# Patient Record
Sex: FEMALE | Race: WHITE | HISPANIC OR LATINO | Employment: UNEMPLOYED | URBAN - METROPOLITAN AREA
[De-identification: names, ages, dates, MRNs, and addresses within clinical notes are randomized per-mention and may not be internally consistent; named-entity substitution may affect disease eponyms.]

---

## 2020-03-02 DIAGNOSIS — B37.3 VAGINAL CANDIDA: Primary | ICD-10-CM

## 2020-03-03 ENCOUNTER — TELEPHONE (OUTPATIENT)
Dept: OBGYN CLINIC | Facility: CLINIC | Age: 50
End: 2020-03-03

## 2020-03-03 DIAGNOSIS — N76.0 BV (BACTERIAL VAGINOSIS): Primary | ICD-10-CM

## 2020-03-03 DIAGNOSIS — B96.89 BV (BACTERIAL VAGINOSIS): Primary | ICD-10-CM

## 2020-03-03 DIAGNOSIS — B37.3 CANDIDA VAGINITIS: ICD-10-CM

## 2020-03-03 RX ORDER — METRONIDAZOLE 500 MG/1
500 TABLET ORAL EVERY 12 HOURS SCHEDULED
Qty: 14 TABLET | Refills: 0 | Status: SHIPPED | OUTPATIENT
Start: 2020-03-03 | End: 2020-03-10

## 2020-03-03 RX ORDER — FLUCONAZOLE 150 MG/1
150 TABLET ORAL ONCE
Qty: 1 TABLET | Refills: 0 | Status: SHIPPED | OUTPATIENT
Start: 2020-03-03 | End: 2020-03-03

## 2020-05-14 ENCOUNTER — TELEPHONE (OUTPATIENT)
Dept: OBGYN CLINIC | Facility: CLINIC | Age: 50
End: 2020-05-14

## 2020-05-14 DIAGNOSIS — N76.0 ACUTE VAGINITIS: Primary | ICD-10-CM

## 2020-05-14 RX ORDER — FLUCONAZOLE 150 MG/1
150 TABLET ORAL ONCE
Qty: 1 TABLET | Refills: 0 | Status: SHIPPED | OUTPATIENT
Start: 2020-05-14 | End: 2020-05-14

## 2020-07-07 ENCOUNTER — OFFICE VISIT (OUTPATIENT)
Dept: OBGYN CLINIC | Facility: CLINIC | Age: 50
End: 2020-07-07
Payer: COMMERCIAL

## 2020-07-07 VITALS
TEMPERATURE: 97.6 F | SYSTOLIC BLOOD PRESSURE: 112 MMHG | WEIGHT: 174 LBS | BODY MASS INDEX: 27.97 KG/M2 | DIASTOLIC BLOOD PRESSURE: 66 MMHG | HEIGHT: 66 IN

## 2020-07-07 DIAGNOSIS — B37.3 VAGINAL CANDIDA: ICD-10-CM

## 2020-07-07 DIAGNOSIS — Z11.3 SCREENING EXAMINATION FOR VENEREAL DISEASE: Primary | ICD-10-CM

## 2020-07-07 DIAGNOSIS — N76.0 ACUTE VAGINITIS: ICD-10-CM

## 2020-07-07 PROCEDURE — 99202 OFFICE O/P NEW SF 15 MIN: CPT | Performed by: OBSTETRICS & GYNECOLOGY

## 2020-07-07 PROCEDURE — 87491 CHLMYD TRACH DNA AMP PROBE: CPT | Performed by: OBSTETRICS & GYNECOLOGY

## 2020-07-07 PROCEDURE — 87591 N.GONORRHOEAE DNA AMP PROB: CPT | Performed by: OBSTETRICS & GYNECOLOGY

## 2020-07-07 RX ORDER — FLUCONAZOLE 150 MG/1
150 TABLET ORAL
Qty: 2 TABLET | Refills: 0 | Status: SHIPPED | OUTPATIENT
Start: 2020-07-07 | End: 2020-07-11

## 2020-07-07 NOTE — PROGRESS NOTES
Assessment/Plan:     Diagnoses and all orders for this visit:    Screening examination for venereal disease  -     Rapid HIV 1/2 AB-AG Combo; Future  -     Hepatitis C antibody; Future  -     Hepatitis B surface antigen; Future  -     RPR; Future  -     Chlamydia/GC amplified DNA by PCR    Acute vaginitis  -     Cancel: VAGINOSIS DNA PROBE (AFFIRM); Future  -     VAGINOSIS DNA PROBE (AFFIRM)    Vaginal candida  -     fluconazole (DIFLUCAN) 150 mg tablet; Take 1 tablet (150 mg total) by mouth every third day for 2 doses    Other orders  -     metFORMIN (GLUCOPHAGE) 500 mg tablet; Take 500 mg by mouth 2 (two) times a day with meals      54 YO FEMALE   Vaginal itching/discahrge  Desires STD check   HSV2  Smoker  DM  PLAN   Gc/ct  Trich/candida  Diflucan  rto for annual exam     Subjective:      Patient ID: Blanca Luo is a 53 y o  female  Vaginal Discharge   The patient's primary symptoms include vaginal discharge  This is a new problem  The current episode started in the past 7 days  The problem occurs intermittently  The problem has been waxing and waning  The patient is experiencing no pain  She is not pregnant  Associated symptoms include abdominal pain  Pertinent negatives include no anorexia, back pain, chills, constipation, diarrhea, discolored urine, dysuria, fever, flank pain, frequency, headaches, hematuria, nausea, urgency or vomiting  The vaginal discharge was white  There has been no bleeding  Nothing aggravates the symptoms  She has tried nothing for the symptoms  It is unknown whether or not her partner has an STD  She uses nothing for contraception  The following portions of the patient's history were reviewed and updated as appropriate: allergies, current medications, past family history, past medical history, past social history, past surgical history and problem list     Review of Systems   Constitutional: Negative for chills and fever  Gastrointestinal: Positive for abdominal pain  Negative for anorexia, constipation, diarrhea, nausea and vomiting  Genitourinary: Positive for vaginal discharge  Negative for dysuria, flank pain, frequency, hematuria and urgency  Musculoskeletal: Negative for back pain  Neurological: Negative for headaches  Objective:      /66 (BP Location: Left arm)   Temp 97 6 °F (36 4 °C)   Ht 5' 6" (1 676 m)   Wt 78 9 kg (174 lb)   BMI 28 08 kg/m²          Physical Exam   Constitutional: She is oriented to person, place, and time  She appears well-developed and well-nourished  Abdominal: Soft  She exhibits no distension  There is no tenderness  Genitourinary: There is no rash, tenderness or lesion on the right labia  There is no rash, tenderness or lesion on the left labia  No erythema or tenderness in the vagina  No signs of injury around the vagina  No vaginal discharge found  Neurological: She is alert and oriented to person, place, and time  Psychiatric: She has a normal mood and affect   Her behavior is normal

## 2020-07-09 LAB
C TRACH DNA SPEC QL NAA+PROBE: NEGATIVE
N GONORRHOEA DNA SPEC QL NAA+PROBE: NEGATIVE

## 2020-08-10 LAB
HBV SURFACE AG SERPL QL IA: NORMAL
HCV AB S/CO SERPL IA: 0
HCV AB SERPL QL IA: NORMAL
HIV 1+2 AB+HIV1 P24 AG SERPL QL IA: NORMAL
RPR SER QL: NORMAL

## 2020-11-20 DIAGNOSIS — B37.9 YEAST INFECTION: Primary | ICD-10-CM

## 2020-11-20 RX ORDER — FLUCONAZOLE 150 MG/1
150 TABLET ORAL ONCE
COMMUNITY
End: 2020-11-20 | Stop reason: SDUPTHER

## 2020-11-20 RX ORDER — FLUCONAZOLE 150 MG/1
150 TABLET ORAL ONCE
Qty: 2 TABLET | Refills: 0 | Status: SHIPPED | OUTPATIENT
Start: 2020-11-20 | End: 2020-11-20

## 2020-12-03 ENCOUNTER — TELEPHONE (OUTPATIENT)
Dept: OBGYN CLINIC | Facility: CLINIC | Age: 50
End: 2020-12-03

## 2020-12-08 ENCOUNTER — TELEPHONE (OUTPATIENT)
Dept: OBGYN CLINIC | Facility: CLINIC | Age: 50
End: 2020-12-08

## 2020-12-08 DIAGNOSIS — N76.0 BV (BACTERIAL VAGINOSIS): Primary | ICD-10-CM

## 2020-12-08 DIAGNOSIS — B96.89 BV (BACTERIAL VAGINOSIS): Primary | ICD-10-CM

## 2020-12-08 RX ORDER — METRONIDAZOLE 500 MG/1
500 TABLET ORAL EVERY 12 HOURS SCHEDULED
Qty: 14 TABLET | Refills: 0 | Status: SHIPPED | OUTPATIENT
Start: 2020-12-08 | End: 2020-12-15

## 2021-01-05 DIAGNOSIS — N39.0 URINARY TRACT INFECTION WITHOUT HEMATURIA, SITE UNSPECIFIED: Primary | ICD-10-CM

## 2021-01-05 RX ORDER — NITROFURANTOIN 25; 75 MG/1; MG/1
100 CAPSULE ORAL 2 TIMES DAILY
Qty: 14 CAPSULE | Refills: 0 | Status: SHIPPED | OUTPATIENT
Start: 2021-01-05 | End: 2021-01-12

## 2021-01-05 NOTE — TELEPHONE ENCOUNTER
Patient called stating she has a really bad uti, dysuria   Is urgently requesting for you to send in an antibiotic to her pharmacy

## 2021-03-23 ENCOUNTER — TELEPHONE (OUTPATIENT)
Dept: OBGYN CLINIC | Facility: CLINIC | Age: 51
End: 2021-03-23

## 2021-03-23 DIAGNOSIS — B37.3 CANDIDA VAGINITIS: Primary | ICD-10-CM

## 2021-03-23 RX ORDER — FLUCONAZOLE 150 MG/1
150 TABLET ORAL
Qty: 2 TABLET | Refills: 0 | Status: SHIPPED | OUTPATIENT
Start: 2021-03-23 | End: 2021-03-27

## 2021-04-28 ENCOUNTER — OFFICE VISIT (OUTPATIENT)
Dept: OBGYN CLINIC | Facility: CLINIC | Age: 51
End: 2021-04-28
Payer: COMMERCIAL

## 2021-04-28 VITALS
DIASTOLIC BLOOD PRESSURE: 76 MMHG | BODY MASS INDEX: 29.09 KG/M2 | WEIGHT: 181 LBS | HEIGHT: 66 IN | SYSTOLIC BLOOD PRESSURE: 120 MMHG

## 2021-04-28 DIAGNOSIS — N76.0 ACUTE VAGINITIS: ICD-10-CM

## 2021-04-28 DIAGNOSIS — Z12.31 ENCOUNTER FOR MAMMOGRAM TO ESTABLISH BASELINE MAMMOGRAM: ICD-10-CM

## 2021-04-28 DIAGNOSIS — Z72.51 HIGH RISK HETEROSEXUAL BEHAVIOR: ICD-10-CM

## 2021-04-28 DIAGNOSIS — N81.6 RECTOCELE: ICD-10-CM

## 2021-04-28 DIAGNOSIS — Z11.3 SCREENING EXAMINATION FOR VENEREAL DISEASE: Primary | ICD-10-CM

## 2021-04-28 DIAGNOSIS — R30.0 DYSURIA: ICD-10-CM

## 2021-04-28 DIAGNOSIS — Z72.51 UNPROTECTED SEXUAL INTERCOURSE: ICD-10-CM

## 2021-04-28 LAB
SL AMB  POCT GLUCOSE, UA: ABNORMAL
SL AMB LEUKOCYTE ESTERASE,UA: ABNORMAL
SL AMB POCT BILIRUBIN,UA: ABNORMAL
SL AMB POCT BLOOD,UA: ABNORMAL
SL AMB POCT KETONES,UA: ABNORMAL
SL AMB POCT NITRITE,UA: ABNORMAL
SL AMB POCT URINE HCG: NORMAL
SL AMB POCT URINE PROTEIN: ABNORMAL

## 2021-04-28 PROCEDURE — 87660 TRICHOMONAS VAGIN DIR PROBE: CPT | Performed by: OBSTETRICS & GYNECOLOGY

## 2021-04-28 PROCEDURE — 87491 CHLMYD TRACH DNA AMP PROBE: CPT | Performed by: OBSTETRICS & GYNECOLOGY

## 2021-04-28 PROCEDURE — 81002 URINALYSIS NONAUTO W/O SCOPE: CPT | Performed by: OBSTETRICS & GYNECOLOGY

## 2021-04-28 PROCEDURE — 87510 GARDNER VAG DNA DIR PROBE: CPT | Performed by: OBSTETRICS & GYNECOLOGY

## 2021-04-28 PROCEDURE — 87591 N.GONORRHOEAE DNA AMP PROB: CPT | Performed by: OBSTETRICS & GYNECOLOGY

## 2021-04-28 PROCEDURE — 99213 OFFICE O/P EST LOW 20 MIN: CPT | Performed by: OBSTETRICS & GYNECOLOGY

## 2021-04-28 PROCEDURE — 81025 URINE PREGNANCY TEST: CPT | Performed by: OBSTETRICS & GYNECOLOGY

## 2021-04-28 PROCEDURE — 87480 CANDIDA DNA DIR PROBE: CPT | Performed by: OBSTETRICS & GYNECOLOGY

## 2021-04-28 RX ORDER — LISINOPRIL 2.5 MG/1
2.5 TABLET ORAL DAILY
COMMUNITY
Start: 2021-03-24

## 2021-04-28 RX ORDER — ROSUVASTATIN CALCIUM 10 MG/1
10 TABLET, COATED ORAL DAILY
COMMUNITY
Start: 2021-04-01

## 2021-04-28 RX ORDER — ASPIRIN 81 MG
81 TABLET,CHEWABLE ORAL DAILY
COMMUNITY
Start: 2021-04-01

## 2021-04-28 RX ORDER — INSULIN GLARGINE 100 [IU]/ML
INJECTION, SOLUTION SUBCUTANEOUS
COMMUNITY
Start: 2021-04-14

## 2021-04-28 RX ORDER — ALPRAZOLAM 0.5 MG/1
0.5 TABLET ORAL DAILY PRN
COMMUNITY
Start: 2021-03-13

## 2021-04-28 RX ORDER — QUETIAPINE FUMARATE 400 MG/1
400 TABLET, FILM COATED ORAL
COMMUNITY
Start: 2021-03-31 | End: 2022-03-30 | Stop reason: SDUPTHER

## 2021-04-28 RX ORDER — CHOLECALCIFEROL (VITAMIN D3) 125 MCG
2000 CAPSULE ORAL DAILY
COMMUNITY
Start: 2021-02-24

## 2021-04-28 NOTE — PROGRESS NOTES
Assessment/Plan:         Diagnoses and all orders for this visit:    Screening examination for venereal disease  -     HIV 1/2 ANTIGEN/ANTIBODY (4TH GENERATION) W REFLEX [facility]; Future  -     Chlamydia/GC amplified DNA by PCR  -     RPR; Future    High risk heterosexual behavior  -     HIV 1/2 ANTIGEN/ANTIBODY (4TH GENERATION) W REFLEX [facility]; Future  -     Chlamydia/GC amplified DNA by PCR  -     RPR; Future    Encounter for mammogram to establish baseline mammogram  -     Mammo screening bilateral w 3d & cad; Future    Acute vaginitis  -     VAGINOSIS DNA PROBE (AFFIRM)    Unprotected sexual intercourse  -     POCT urine HCG    Dysuria  -     POCT urine dip    Other orders  -     Lantus SoloStar 100 units/mL injection pen; INJECT 28 UNIT BY SUBCUTANEOUS ROUTE EVERY DAY  -     QUEtiapine (SEROquel) 400 MG tablet; Take 400 mg by mouth daily at bedtime  -     lisinopril (ZESTRIL) 2 5 mg tablet; Take 2 5 mg by mouth daily  -     rosuvastatin (CRESTOR) 10 MG tablet; Take 10 mg by mouth daily  -     Aspirin Low Dose 81 MG chewable tablet; Chew 81 mg daily Chew  -     ALPRAZolam (XANAX) 0 5 mg tablet; Take 0 5 mg by mouth daily as needed  -     Cholecalciferol (Vitamin D3) 50 MCG (2000 UT) TABS; Take 2,000 Units by mouth daily          Subjective:      Patient ID: Jasen Sanders is a 53 y o  female  Patient is a 81-year-old nulligravida who presents for 2 reasons 1)  She notices some mild dysuria with some vaginal discomfort and urethral discomfort  She has discovered that her sexual partner has not been monogamous and she is concerned that she may have some type of sexually transmitted infection  She requests testing for HIV, syphilis, gonorrhea, and chlamydia  Cultures will be performed for all of those infections and blood work referrals given to the patient  Urinalysis was negative    2)  She notices a vaginal lump in the posterior aspect of the vagina Lo very close to the introitus which is not painful but has made her very anxious because her brother has anal carcinoma and she is concerned that she may also have something similar  She is turning 49 in June and has a colonoscopy scheduled at that time  Inspection revealed a small rectocele which coincides with the area that she describes feeling a bulge in  A rectal exam was performed and no mass was noted there is stool in the rectocele  A Hemoccult test was performed and it was heme negative  She will keep her appointment for her colonoscopy  The following portions of the patient's history were reviewed and updated as appropriate: allergies, current medications, past family history, past medical history, past social history, past surgical history and problem list     Review of Systems   Constitutional: Negative for chills, diaphoresis, fatigue, fever and unexpected weight change  HENT: Negative for congestion, sinus pressure, sinus pain, tinnitus and trouble swallowing  Eyes: Negative for visual disturbance  Respiratory: Negative for cough, chest tightness and shortness of breath  Cardiovascular: Negative for chest pain, palpitations and leg swelling  Gastrointestinal: Negative for abdominal distention, abdominal pain, anal bleeding, constipation, diarrhea, nausea, rectal pain and vomiting  Endocrine: Negative for heat intolerance  Genitourinary: Positive for vaginal pain  Negative for difficulty urinating, flank pain, frequency, genital sores, hematuria, urgency, vaginal bleeding and vaginal discharge  Musculoskeletal: Negative for arthralgias, back pain and joint swelling  Skin: Negative for rash  Allergic/Immunologic: Negative for environmental allergies and food allergies  Neurological: Negative for headaches  Hematological: Negative for adenopathy  Does not bruise/bleed easily  Psychiatric/Behavioral: Negative for decreased concentration and dysphoric mood  The patient is not nervous/anxious            Objective:      /76 (BP Location: Left arm)   Ht 5' 6" (1 676 m)   Wt 82 1 kg (181 lb)   BMI 29 21 kg/m²          Physical Exam  Vitals signs and nursing note reviewed  Exam conducted with a chaperone present  Constitutional:       General: She is not in acute distress  Appearance: Normal appearance  She is normal weight  She is not ill-appearing  HENT:      Head: Normocephalic  Nose: Nose normal       Mouth/Throat:      Mouth: Mucous membranes are moist       Pharynx: Oropharynx is clear  Eyes:      Conjunctiva/sclera: Conjunctivae normal       Pupils: Pupils are equal, round, and reactive to light  Neck:      Musculoskeletal: Neck supple  Cardiovascular:      Rate and Rhythm: Normal rate and regular rhythm  Pulses: Normal pulses  Pulmonary:      Effort: Pulmonary effort is normal       Breath sounds: Normal breath sounds  Chest:      Breasts: Zeeshan Score is 5  Right: Normal  No mass, nipple discharge, skin change or tenderness  Left: Normal  No mass, nipple discharge, skin change or tenderness  Abdominal:      General: Abdomen is flat  Bowel sounds are normal       Palpations: Abdomen is soft  Genitourinary:     General: Normal vulva  Exam position: Lithotomy position  Zeeshan stage (genital): 5       Vagina: Prolapsed vaginal walls present  Cervix: Normal       Uterus: Normal        Adnexa: Right adnexa normal and left adnexa normal       Rectum: Normal       Comments: Small rectocele  Musculoskeletal: Normal range of motion  Lymphadenopathy:      Upper Body:      Right upper body: No axillary adenopathy  Left upper body: No axillary adenopathy  Skin:     General: Skin is warm and dry  Neurological:      General: No focal deficit present  Mental Status: She is alert     Psychiatric:         Mood and Affect: Mood normal

## 2021-05-01 LAB
C TRACH DNA SPEC QL NAA+PROBE: NEGATIVE
CANDIDA RRNA VAG QL PROBE: NEGATIVE
G VAGINALIS RRNA GENITAL QL PROBE: NEGATIVE
N GONORRHOEA DNA SPEC QL NAA+PROBE: NEGATIVE
T VAGINALIS RRNA GENITAL QL PROBE: NEGATIVE

## 2021-05-07 ENCOUNTER — APPOINTMENT (OUTPATIENT)
Dept: LAB | Facility: HOSPITAL | Age: 51
End: 2021-05-07
Attending: OBSTETRICS & GYNECOLOGY
Payer: COMMERCIAL

## 2021-05-07 ENCOUNTER — TRANSCRIBE ORDERS (OUTPATIENT)
Dept: ADMINISTRATIVE | Facility: HOSPITAL | Age: 51
End: 2021-05-07

## 2021-05-07 DIAGNOSIS — Z11.3 SCREENING EXAMINATION FOR VENEREAL DISEASE: ICD-10-CM

## 2021-05-07 DIAGNOSIS — E11.9 DIABETES MELLITUS WITHOUT COMPLICATION (HCC): ICD-10-CM

## 2021-05-07 DIAGNOSIS — E55.9 AVITAMINOSIS D: ICD-10-CM

## 2021-05-07 DIAGNOSIS — E78.2 MIXED HYPERLIPIDEMIA: ICD-10-CM

## 2021-05-07 DIAGNOSIS — Z72.51 HIGH RISK HETEROSEXUAL BEHAVIOR: ICD-10-CM

## 2021-05-07 DIAGNOSIS — E11.9 DIABETES MELLITUS WITHOUT COMPLICATION (HCC): Primary | ICD-10-CM

## 2021-05-07 LAB
25(OH)D3 SERPL-MCNC: 27.2 NG/ML (ref 30–100)
ALBUMIN SERPL BCP-MCNC: 4.3 G/DL (ref 3.4–4.8)
ALP SERPL-CCNC: 99.1 U/L (ref 35–140)
ALT SERPL W P-5'-P-CCNC: 50 U/L (ref 5–54)
ANION GAP SERPL CALCULATED.3IONS-SCNC: 7 MMOL/L (ref 4–13)
AST SERPL W P-5'-P-CCNC: 24 U/L (ref 15–41)
BASOPHILS # BLD AUTO: 0.03 THOUSANDS/ΜL (ref 0–0.1)
BASOPHILS NFR BLD AUTO: 1 % (ref 0–1)
BILIRUB SERPL-MCNC: 0.35 MG/DL (ref 0.3–1.2)
BUN SERPL-MCNC: 14 MG/DL (ref 6–20)
CALCIUM SERPL-MCNC: 9.5 MG/DL (ref 8.4–10.2)
CHLORIDE SERPL-SCNC: 106 MMOL/L (ref 96–108)
CHOLEST SERPL-MCNC: 136 MG/DL
CO2 SERPL-SCNC: 27 MMOL/L (ref 22–33)
CREAT SERPL-MCNC: 0.65 MG/DL (ref 0.4–1.1)
CREAT UR-MCNC: 108 MG/DL
EOSINOPHIL # BLD AUTO: 0.1 THOUSAND/ΜL (ref 0–0.61)
EOSINOPHIL NFR BLD AUTO: 2 % (ref 0–6)
ERYTHROCYTE [DISTWIDTH] IN BLOOD BY AUTOMATED COUNT: 14.4 % (ref 11.6–15.1)
EST. AVERAGE GLUCOSE BLD GHB EST-MCNC: 137 MG/DL
GFR SERPL CREATININE-BSD FRML MDRD: 105 ML/MIN/1.73SQ M
GLUCOSE P FAST SERPL-MCNC: 183 MG/DL (ref 70–105)
HBA1C MFR BLD: 6.4 %
HCT VFR BLD AUTO: 43.8 % (ref 34.8–46.1)
HDLC SERPL-MCNC: 39 MG/DL
HGB BLD-MCNC: 13.9 G/DL (ref 11.5–15.4)
IMM GRANULOCYTES # BLD AUTO: 0.03 THOUSAND/UL (ref 0–0.2)
IMM GRANULOCYTES NFR BLD AUTO: 1 % (ref 0–2)
LDLC SERPL CALC-MCNC: 55 MG/DL (ref 0–100)
LYMPHOCYTES # BLD AUTO: 1.19 THOUSANDS/ΜL (ref 0.6–4.47)
LYMPHOCYTES NFR BLD AUTO: 19 % (ref 14–44)
MCH RBC QN AUTO: 25.7 PG (ref 26.8–34.3)
MCHC RBC AUTO-ENTMCNC: 31.7 G/DL (ref 31.4–37.4)
MCV RBC AUTO: 81 FL (ref 82–98)
MICROALBUMIN UR-MCNC: 10.5 MG/L (ref 0–20)
MICROALBUMIN/CREAT 24H UR: 10 MG/G CREATININE (ref 0–30)
MONOCYTES # BLD AUTO: 0.38 THOUSAND/ΜL (ref 0.17–1.22)
MONOCYTES NFR BLD AUTO: 6 % (ref 4–12)
NEUTROPHILS # BLD AUTO: 4.54 THOUSANDS/ΜL (ref 1.85–7.62)
NEUTS SEG NFR BLD AUTO: 71 % (ref 43–75)
NONHDLC SERPL-MCNC: 97 MG/DL
PLATELET # BLD AUTO: 192 THOUSANDS/UL (ref 149–390)
PMV BLD AUTO: 11.2 FL (ref 8.9–12.7)
POTASSIUM SERPL-SCNC: 4.4 MMOL/L (ref 3.5–5)
PROT SERPL-MCNC: 6.9 G/DL (ref 6.4–8.3)
RBC # BLD AUTO: 5.4 MILLION/UL (ref 3.81–5.12)
RPR SER QL: NORMAL
SODIUM SERPL-SCNC: 140 MMOL/L (ref 133–145)
TRIGL SERPL-MCNC: 212.4 MG/DL
TSH SERPL DL<=0.05 MIU/L-ACNC: 1.76 UIU/ML (ref 0.34–5.6)
WBC # BLD AUTO: 6.27 THOUSAND/UL (ref 4.31–10.16)

## 2021-05-07 PROCEDURE — 87389 HIV-1 AG W/HIV-1&-2 AB AG IA: CPT

## 2021-05-07 PROCEDURE — 82306 VITAMIN D 25 HYDROXY: CPT

## 2021-05-07 PROCEDURE — 85025 COMPLETE CBC W/AUTO DIFF WBC: CPT

## 2021-05-07 PROCEDURE — 82570 ASSAY OF URINE CREATININE: CPT | Performed by: FAMILY MEDICINE

## 2021-05-07 PROCEDURE — 86592 SYPHILIS TEST NON-TREP QUAL: CPT

## 2021-05-07 PROCEDURE — 36415 COLL VENOUS BLD VENIPUNCTURE: CPT

## 2021-05-07 PROCEDURE — 84443 ASSAY THYROID STIM HORMONE: CPT

## 2021-05-07 PROCEDURE — 82043 UR ALBUMIN QUANTITATIVE: CPT | Performed by: FAMILY MEDICINE

## 2021-05-07 PROCEDURE — 80053 COMPREHEN METABOLIC PANEL: CPT

## 2021-05-07 PROCEDURE — 83036 HEMOGLOBIN GLYCOSYLATED A1C: CPT

## 2021-05-07 PROCEDURE — 80061 LIPID PANEL: CPT

## 2021-05-10 LAB — HIV 1+2 AB+HIV1 P24 AG SERPL QL IA: NORMAL

## 2021-09-20 ENCOUNTER — OFFICE VISIT (OUTPATIENT)
Dept: OBGYN CLINIC | Facility: CLINIC | Age: 51
End: 2021-09-20
Payer: COMMERCIAL

## 2021-09-20 VITALS
WEIGHT: 176.2 LBS | DIASTOLIC BLOOD PRESSURE: 84 MMHG | SYSTOLIC BLOOD PRESSURE: 128 MMHG | BODY MASS INDEX: 28.32 KG/M2 | HEIGHT: 66 IN

## 2021-09-20 DIAGNOSIS — Z11.3 ENCOUNTER FOR SPECIAL SCREENING EXAMINATION FOR INFECTION WITH PREDOMINANTLY SEXUAL MODE OF TRANSMISSION: ICD-10-CM

## 2021-09-20 DIAGNOSIS — N89.8 VAGINAL DISCHARGE: ICD-10-CM

## 2021-09-20 DIAGNOSIS — R30.0 BURNING WITH URINATION: ICD-10-CM

## 2021-09-20 DIAGNOSIS — Z72.51 HIGH RISK HETEROSEXUAL BEHAVIOR: ICD-10-CM

## 2021-09-20 DIAGNOSIS — Z01.411 ENCOUNTER FOR GYNECOLOGICAL EXAMINATION WITH ABNORMAL FINDING: Primary | ICD-10-CM

## 2021-09-20 LAB
SL AMB  POCT GLUCOSE, UA: ABNORMAL
SL AMB LEUKOCYTE ESTERASE,UA: ABNORMAL
SL AMB POCT BILIRUBIN,UA: ABNORMAL
SL AMB POCT BLOOD,UA: ABNORMAL
SL AMB POCT CLARITY,UA: ABNORMAL
SL AMB POCT COLOR,UA: ABNORMAL
SL AMB POCT KETONES,UA: ABNORMAL
SL AMB POCT NITRITE,UA: POSITIVE
SL AMB POCT PH,UA: ABNORMAL
SL AMB POCT SPECIFIC GRAVITY,UA: ABNORMAL
SL AMB POCT URINE PROTEIN: ABNORMAL
SL AMB POCT UROBILINOGEN: ABNORMAL

## 2021-09-20 PROCEDURE — 87086 URINE CULTURE/COLONY COUNT: CPT | Performed by: OBSTETRICS & GYNECOLOGY

## 2021-09-20 PROCEDURE — 87491 CHLMYD TRACH DNA AMP PROBE: CPT | Performed by: OBSTETRICS & GYNECOLOGY

## 2021-09-20 PROCEDURE — G0145 SCR C/V CYTO,THINLAYER,RESCR: HCPCS | Performed by: OBSTETRICS & GYNECOLOGY

## 2021-09-20 PROCEDURE — 87660 TRICHOMONAS VAGIN DIR PROBE: CPT | Performed by: OBSTETRICS & GYNECOLOGY

## 2021-09-20 PROCEDURE — G0476 HPV COMBO ASSAY CA SCREEN: HCPCS | Performed by: OBSTETRICS & GYNECOLOGY

## 2021-09-20 PROCEDURE — 87480 CANDIDA DNA DIR PROBE: CPT | Performed by: OBSTETRICS & GYNECOLOGY

## 2021-09-20 PROCEDURE — 81002 URINALYSIS NONAUTO W/O SCOPE: CPT | Performed by: OBSTETRICS & GYNECOLOGY

## 2021-09-20 PROCEDURE — 87591 N.GONORRHOEAE DNA AMP PROB: CPT | Performed by: OBSTETRICS & GYNECOLOGY

## 2021-09-20 PROCEDURE — 0503F POSTPARTUM CARE VISIT: CPT | Performed by: OBSTETRICS & GYNECOLOGY

## 2021-09-20 PROCEDURE — 87510 GARDNER VAG DNA DIR PROBE: CPT | Performed by: OBSTETRICS & GYNECOLOGY

## 2021-09-20 PROCEDURE — 99396 PREV VISIT EST AGE 40-64: CPT | Performed by: OBSTETRICS & GYNECOLOGY

## 2021-09-20 RX ORDER — NITROFURANTOIN 25; 75 MG/1; MG/1
100 CAPSULE ORAL 2 TIMES DAILY
Qty: 14 CAPSULE | Refills: 0 | Status: SHIPPED | OUTPATIENT
Start: 2021-09-20 | End: 2021-09-27

## 2021-09-20 RX ORDER — METRONIDAZOLE 500 MG/1
500 TABLET ORAL EVERY 12 HOURS SCHEDULED
Qty: 14 TABLET | Refills: 0 | Status: SHIPPED | OUTPATIENT
Start: 2021-09-20 | End: 2021-09-27

## 2021-09-20 NOTE — PROGRESS NOTES
Yaniv Yepez is a 49 y o  female who presents for annual well woman exam   lmp 2 Y AGO     Menstrual History:  OB History        0    Para   0    Term   0       0    AB   0    Living   0       SAB   0    TAB   0    Ectopic   0    Multiple   0    Live Births   0                  No LMP recorded  Patient is postmenopausal        The following portions of the patient's history were reviewed and updated as appropriate: allergies, current medications, past family history, past medical history, past social history, past surgical history and problem list     Review of Systems  Review of Systems   Constitutional: Negative for activity change, appetite change, chills, fatigue and fever  Respiratory: Negative for cough and shortness of breath  Cardiovascular: Negative for chest pain, palpitations and leg swelling  Gastrointestinal: Negative for abdominal pain, constipation, diarrhea, nausea and vomiting  Genitourinary: Positive for dysuria  Negative for difficulty urinating, flank pain, frequency, hematuria, urgency and vaginal discharge  Neurological: Negative for dizziness and headaches  Psychiatric/Behavioral: Negative for confusion  Also complaining of vaginal odor      Objective      /84 (BP Location: Left arm, Patient Position: Sitting, Cuff Size: Standard)   Ht 5' 6" (1 676 m)   Wt 79 9 kg (176 lb 3 2 oz)   BMI 28 44 kg/m²     Physical Exam  OBGyn Exam     General:   alert and oriented, in no acute distress, alert, appears stated age and cooperative   Heart: regular rate and rhythm, S1, S2 normal, no murmur, click, rub or gallop   Lungs: clear to auscultation bilaterally   Abdomen: soft, non-tender, without masses or organomegaly   Vulva: normal   Vagina: normal mucosa, normal discharge   Cervix: no lesions   Uterus: normal size   Adnexa:  Breast Exam:  normal adnexa  breasts appear normal, no suspicious masses, no skin or nipple changes or axillary nodes  Assessment      @well woman@   48 YO FEMALE    annual exam  Dysuria   recurrent bacterial vaginosis  desires STD check  DM  smoker  asthma  bipolar/ depression stable  had laparoscopy sec to intra abd abscess with I&D at age 13 cause infertility  HSV2  plan  Plan   Pap/hpv   diet/exercise  Calcium / vitamin-D   Urine culture   Macrobid  Flagyl secondary to recurrent BV   GC/CT / affirm based on patient request  Smoking cessation highly recommended   Mammogram  Colonoscopy referral   Return to office for annual exam   All questions answered  There are no Patient Instructions on file for this visit

## 2021-09-21 ENCOUNTER — HOSPITAL ENCOUNTER (EMERGENCY)
Facility: HOSPITAL | Age: 51
Discharge: HOME | End: 2021-09-21
Payer: COMMERCIAL

## 2021-09-21 VITALS
HEART RATE: 90 BPM | BODY MASS INDEX: 28.41 KG/M2 | DIASTOLIC BLOOD PRESSURE: 91 MMHG | RESPIRATION RATE: 18 BRPM | WEIGHT: 176 LBS | SYSTOLIC BLOOD PRESSURE: 137 MMHG | TEMPERATURE: 98.6 F | OXYGEN SATURATION: 99 %

## 2021-09-21 DIAGNOSIS — T14.8XXA PUNCTURE WOUND: ICD-10-CM
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
## 2021-09-21 DIAGNOSIS — S80.11XA CONTUSION OF RIGHT LOWER EXTREMITY, INITIAL ENCOUNTER: ICD-10-CM

## 2021-09-21 DIAGNOSIS — S81.851A DOG BITE OF RIGHT LOWER LEG, INITIAL ENCOUNTER: Primary | ICD-10-CM

## 2021-09-21 LAB
BACTERIA UR CULT: NORMAL
C TRACH DNA SPEC QL NAA+PROBE: NEGATIVE
N GONORRHOEA DNA SPEC QL NAA+PROBE: NEGATIVE

## 2021-09-21 PROCEDURE — 99283 EMERGENCY DEPT VISIT LOW MDM: CPT

## 2021-09-21 PROCEDURE — 99284 EMERGENCY DEPT VISIT MOD MDM: CPT | Performed by: PHYSICIAN ASSISTANT

## 2021-09-21 PROCEDURE — 90471 IMMUNIZATION ADMIN: CPT

## 2021-09-21 PROCEDURE — 90715 TDAP VACCINE 7 YRS/> IM: CPT | Performed by: PHYSICIAN ASSISTANT

## 2021-09-21 RX ADMIN — TETANUS TOXOID, REDUCED DIPHTHERIA TOXOID AND ACELLULAR PERTUSSIS VACCINE, ADSORBED 0.5 ML: 5; 2.5; 8; 8; 2.5 SUSPENSION INTRAMUSCULAR at 14:17

## 2021-09-21 NOTE — ED NOTES
Patient was bite by a dog on Friday  States that she thinks the area is too " green" and not " black and blue enough"   Patient noted have some ecchymosis to right shin area with healing abrasion  No redness, swelling or warmth noted  Reuben Wells RN  09/21/21 1443

## 2021-09-21 NOTE — ED PROVIDER NOTES
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 Taking?    ALPRAZolam (XANAX) 0 5 mg tablet   Yes No   Sig: Take 0 5 mg by mouth daily as needed   Aspirin Low Dose 81 MG chewable tablet   Yes No   Sig: Chew 81 mg daily Chew   Cholecalciferol (Vitamin D3) 50 MCG (2000 UT) TABS   Yes No   Sig: Take 2,000 Units by mouth daily   Lantus SoloStar 100 units/mL injection pen   Yes No   Sig: INJECT 28 UNIT BY SUBCUTANEOUS ROUTE EVERY DAY   QUEtiapine (SEROquel) 400 MG tablet   Yes No   Sig: Take 400 mg by mouth daily at bedtime   lisinopril (ZESTRIL) 2 5 mg tablet   Yes No   Sig: Take 2 5 mg by mouth daily   Patient not taking: Reported on 9/20/2021   metFORMIN (GLUCOPHAGE) 500 mg tablet   Yes No   Sig: Take 500 mg by mouth 2 (two) times a day with meals   metroNIDAZOLE (FLAGYL) 500 mg tablet   No No   Sig: Take 1 tablet (500 mg total) by mouth every 12 (twelve) hours for 7 days   nitrofurantoin (MACROBID) 100 mg capsule   No No   Sig: Take 1 capsule (100 mg total) by mouth 2 (two) times a day for 7 days   rosuvastatin (CRESTOR) 10 MG tablet   Yes No   Sig: Take 10 mg by mouth daily      Facility-Administered Medications: None       Past Medical History:   Diagnosis Date    Asthma     Depression     Diabetes mellitus ([facility] [address] )     type 2    Hypertension        Past Surgical History:   Procedure Laterality Date    KNEE ARTHROCENTESIS      MOUTH SURGERY         Family History   Problem Relation Age of Onset    Diabetes Mother     Diabetes Father      I have reviewed and agree with the history as documented  E-Cigarette/Vaping    E-Cigarette Use Never User      E-Cigarette/Vaping Substances    THC No     CBD No      Social History     Tobacco Use    Smoking status: Current Every Day Smoker     Packs/day: 1 00     Types: Cigarettes    Smokeless tobacco: Never Used   Vaping Use    Vaping Use: Never used   Substance Use Topics    Alcohol use: Not Currently    Drug use: Never       Review of Systems   Constitutional: Negative for chills and fever  HENT: Negative for hearing loss and sore throat  Eyes: Negative for visual disturbance  Respiratory: Negative for shortness of breath  Cardiovascular: Negative for chest pain  Gastrointestinal: Negative for abdominal pain and vomiting  Musculoskeletal: Positive for myalgias  Negative for arthralgias and gait problem  Skin: Positive for color change and wound  Negative for pallor  Neurological: Negative for dizziness  Hematological: Bruises/bleeds easily  All other systems reviewed and are negative  Physical Exam  Physical Exam  Vitals and nursing note reviewed  Constitutional:       Appearance: She is well-developed  HENT:      Head: Normocephalic and atraumatic  Right Ear: External ear normal       Left Ear: External ear normal       Nose: Nose normal       Mouth/Throat:      Mouth: Mucous membranes are moist       Pharynx: Oropharynx is clear  Eyes:      Conjunctiva/sclera: Conjunctivae normal    Cardiovascular:      Rate and Rhythm: Normal rate  Pulmonary:      Effort: Pulmonary effort is normal  No respiratory distress     Musculoskeletal:         General: Swelling and tenderness present  Normal range of motion  Cervical back: Normal range of motion  Comments: RLE:  + 2  Small healing scab puncture wounds with mild surrounding older ecchymosis and slight swelling noted to right lower leg, no erythema, no warmth, no discharge, no signs of infection or cellulitis   Skin:     General: Skin is warm and dry  Findings: Bruising present  Neurological:      Mental Status: She is alert and oriented to person, place, and time  Motor: No weakness  Psychiatric:         Behavior: Behavior normal          Vital Signs  ED Triage Vitals [09/21/21 1336]   Temperature Pulse Respirations Blood Pressure SpO2   98 6 °F (37 °C) 90 18 137/91 99 %      Temp Source Heart Rate Source Patient Position - Orthostatic VS BP Location FiO2 (%)   Oral Monitor -- -- --      Pain Score       6           Vitals:    09/21/21 1336   BP: 137/91   Pulse: 90         Visual Acuity      ED Medications  Medications   tetanus-diphtheria-acellular pertussis (BOOSTRIX) IM injection 0 5 mL (has no administration in time range)       Diagnostic Studies  Results Reviewed     None                 No orders to display              Procedures  Procedures         ED Course                                           MDM    Disposition  Final diagnoses:   Dog bite of right lower leg, initial encounter   Contusion of right lower extremity, initial encounter   Puncture wound     Time reflects when diagnosis was documented in both MDM as applicable and the Disposition within this note     Time User Action Codes Description Comment    9/21/2021  1:59 PM Liam Krueger [A05 89Y,  W54  0XXA] Dog bite of right lower leg, initial encounter     9/21/2021  2:00 PM Liam Krueger [S80 [id number]] Contusion of right lower extremity, initial encounter     9/21/2021  2:01 PM Kb Rodriguez 32  8XXA] Puncture wound       ED Disposition     ED Disposition Condition Date/Time Comment    Discharge Stable Tue Sep 21, 2021  1:58 PM Gerald Lyons discharge to home/self care  Follow-up Information     Follow up With Specialties Details Why Contact Info    Lucas Medellin MD Family Medicine  As needed [address]   [phone]            Patient's Medications   Discharge Prescriptions    No medications on file     No discharge procedures on file      PDMP Review     None          ED Provider  Electronically Signed by           Artemio Coley PA-C  09/21/21 5528

## 2021-09-21 NOTE — DISCHARGE INSTRUCTIONS
Use Tylenol every 4 hours or Motrin every 6 hours; you can alternate the 2 medications taking something every 3 hours for pain  Keep wound clean with warm, soapy water and covered with antibiotic ointment and dressing

## 2021-09-22 LAB
CANDIDA RRNA VAG QL PROBE: NEGATIVE
G VAGINALIS RRNA GENITAL QL PROBE: NEGATIVE
T VAGINALIS RRNA GENITAL QL PROBE: NEGATIVE

## 2021-09-23 LAB
HPV HR 12 DNA CVX QL NAA+PROBE: NEGATIVE
HPV16 DNA CVX QL NAA+PROBE: NEGATIVE
HPV18 DNA CVX QL NAA+PROBE: NEGATIVE

## 2021-09-24 LAB
LAB AP GYN PRIMARY INTERPRETATION: NORMAL
Lab: NORMAL

## 2021-11-15 DIAGNOSIS — B37.9 YEAST INFECTION: Primary | ICD-10-CM

## 2021-11-15 DIAGNOSIS — B96.89 BV (BACTERIAL VAGINOSIS): ICD-10-CM

## 2021-11-15 DIAGNOSIS — N76.0 BV (BACTERIAL VAGINOSIS): ICD-10-CM

## 2021-11-15 RX ORDER — FLUCONAZOLE 150 MG/1
150 TABLET ORAL ONCE
COMMUNITY
End: 2021-11-15 | Stop reason: SDUPTHER

## 2021-11-15 RX ORDER — METRONIDAZOLE 500 MG/1
500 TABLET ORAL EVERY 8 HOURS SCHEDULED
COMMUNITY
End: 2021-11-15 | Stop reason: SDUPTHER

## 2021-11-16 RX ORDER — FLUCONAZOLE 150 MG/1
150 TABLET ORAL ONCE
Qty: 2 TABLET | Refills: 0 | Status: SHIPPED | OUTPATIENT
Start: 2021-11-16 | End: 2021-11-16

## 2021-11-16 RX ORDER — METRONIDAZOLE 500 MG/1
500 TABLET ORAL EVERY 12 HOURS SCHEDULED
Qty: 14 TABLET | Refills: 0 | Status: SHIPPED | OUTPATIENT
Start: 2021-11-16 | End: 2021-11-23

## 2022-01-12 ENCOUNTER — OFFICE VISIT (OUTPATIENT)
Dept: OBGYN CLINIC | Facility: CLINIC | Age: 52
End: 2022-01-12
Payer: COMMERCIAL

## 2022-01-12 VITALS
HEIGHT: 66 IN | DIASTOLIC BLOOD PRESSURE: 84 MMHG | WEIGHT: 182.6 LBS | SYSTOLIC BLOOD PRESSURE: 124 MMHG | BODY MASS INDEX: 29.35 KG/M2

## 2022-01-12 DIAGNOSIS — Z72.51 HIGH RISK HETEROSEXUAL BEHAVIOR: ICD-10-CM

## 2022-01-12 DIAGNOSIS — Z11.3 SCREENING EXAMINATION FOR VENEREAL DISEASE: ICD-10-CM

## 2022-01-12 DIAGNOSIS — Z11.3 SCREENING EXAMINATION FOR STD (SEXUALLY TRANSMITTED DISEASE): Primary | ICD-10-CM

## 2022-01-12 PROCEDURE — 87480 CANDIDA DNA DIR PROBE: CPT | Performed by: OBSTETRICS & GYNECOLOGY

## 2022-01-12 PROCEDURE — 87510 GARDNER VAG DNA DIR PROBE: CPT | Performed by: OBSTETRICS & GYNECOLOGY

## 2022-01-12 PROCEDURE — 87660 TRICHOMONAS VAGIN DIR PROBE: CPT | Performed by: OBSTETRICS & GYNECOLOGY

## 2022-01-12 PROCEDURE — 87591 N.GONORRHOEAE DNA AMP PROB: CPT | Performed by: OBSTETRICS & GYNECOLOGY

## 2022-01-12 PROCEDURE — 87491 CHLMYD TRACH DNA AMP PROBE: CPT | Performed by: OBSTETRICS & GYNECOLOGY

## 2022-01-12 PROCEDURE — 99213 OFFICE O/P EST LOW 20 MIN: CPT | Performed by: OBSTETRICS & GYNECOLOGY

## 2022-01-13 NOTE — PROGRESS NOTES
Assessment/Plan:     Diagnoses and all orders for this visit:    Screening examination for STD (sexually transmitted disease)  -     HIV 1/2 Antigen/Antibody (4th Generation) w Reflex [facility]; Future  -     RPR; Future  -     Hepatitis B surface antigen; Future  -     Hepatitis C antibody; Future  -     Herpes I/II IgG Antibodies; Future  -     VAGINOSIS DNA PROBE (AFFIRM); Future  -     Chlamydia/GC amplified DNA by PCR; Future  -     VAGINOSIS DNA PROBE (AFFIRM)  -     Chlamydia/GC amplified DNA by PCR    Screening examination for venereal disease    High risk heterosexual behavior  -     VAGINOSIS DNA PROBE (AFFIRM); Future  -     Chlamydia/GC amplified DNA by PCR; Future  -     VAGINOSIS DNA PROBE (AFFIRM)  -     Chlamydia/GC amplified DNA by PCR            50 YO FEMALE   Desire STD check   recurrent bacterial vaginosis  DM  smoker  asthma  bipolar/ depression stable  had laparoscopy sec to intra abd abscess with I&D at age 13 cause infertility  HSV2  plan  Plan   GC/CT/affirm   diet/exercise  Calcium / vitamin-D        Subjective:      Patient ID: Rosita Alvarado is a 49 y o  female  Exposure to STD   Primary symptoms comment: Irritation  This is a new problem  The current episode started in the past 7 days  The problem has been unchanged  The vaginal discharge was white  Pertinent negatives include no abdominal pain, anorexia, fever, genital odor, rectal pain or urinary frequency  She has tried nothing for the symptoms  Risk factors include history of STDs  Patient seen evaluated presents to the office today for STD check patient has vaginal dryness and irritation     The following portions of the patient's history were reviewed and updated as appropriate: allergies, current medications, past family history, past medical history, past social history, past surgical history and problem list     Review of Systems   Constitutional: Negative for fever     Gastrointestinal: Negative for abdominal pain, anorexia and rectal pain  Genitourinary: Negative for frequency  Objective:      /84 (BP Location: Left arm, Patient Position: Sitting, Cuff Size: Large)   Ht 5' 6" (1 676 m)   Wt 82 8 kg (182 lb 9 6 oz)   BMI 29 47 kg/m²          Physical Exam  Constitutional:       Appearance: She is well-developed  Abdominal:      General: There is no distension  Palpations: Abdomen is soft  Tenderness: There is no abdominal tenderness  Genitourinary:     Labia:         Right: No rash, tenderness or lesion  Left: No rash, tenderness or lesion  Vagina: No signs of injury  No vaginal discharge, erythema or tenderness  Cervix: No cervical motion tenderness, discharge or friability  Adnexa:         Right: No mass, tenderness or fullness  Left: No mass, tenderness or fullness  Neurological:      Mental Status: She is alert and oriented to person, place, and time     Psychiatric:         Behavior: Behavior normal

## 2022-03-04 ENCOUNTER — APPOINTMENT (OUTPATIENT)
Dept: LAB | Facility: HOSPITAL | Age: 52
End: 2022-03-04
Attending: OBSTETRICS & GYNECOLOGY
Payer: COMMERCIAL

## 2022-03-04 DIAGNOSIS — M19.90 SENILE ARTHRITIS: ICD-10-CM

## 2022-03-04 DIAGNOSIS — E13.69 OTHER SPECIFIED DIABETES MELLITUS WITH OTHER SPECIFIED COMPLICATION, UNSPECIFIED WHETHER LONG TERM INSULIN USE (HCC): ICD-10-CM

## 2022-03-04 DIAGNOSIS — E55.9 VITAMIN D DEFICIENCY: ICD-10-CM

## 2022-03-04 DIAGNOSIS — Z11.3 SCREENING EXAMINATION FOR STD (SEXUALLY TRANSMITTED DISEASE): ICD-10-CM

## 2022-03-04 DIAGNOSIS — Z79.899 ENCOUNTER FOR LONG-TERM (CURRENT) USE OF OTHER MEDICATIONS: ICD-10-CM

## 2022-03-04 DIAGNOSIS — E78.2 MIXED HYPERLIPIDEMIA: ICD-10-CM

## 2022-03-04 LAB
25(OH)D3 SERPL-MCNC: 42.8 NG/ML (ref 30–100)
ALBUMIN SERPL BCP-MCNC: 4.3 G/DL (ref 3.5–5)
ALP SERPL-CCNC: 103 U/L (ref 34–104)
ALT SERPL W P-5'-P-CCNC: 77 U/L (ref 7–52)
ANION GAP SERPL CALCULATED.3IONS-SCNC: 9 MMOL/L (ref 4–13)
AST SERPL W P-5'-P-CCNC: 42 U/L (ref 13–39)
BASOPHILS # BLD AUTO: 0.04 THOUSANDS/ΜL (ref 0–0.1)
BASOPHILS NFR BLD AUTO: 1 % (ref 0–1)
BILIRUB SERPL-MCNC: 0.46 MG/DL (ref 0.2–1)
BUN SERPL-MCNC: 10 MG/DL (ref 5–25)
CALCIUM SERPL-MCNC: 9.7 MG/DL (ref 8.4–10.2)
CHLORIDE SERPL-SCNC: 102 MMOL/L (ref 96–108)
CHOLEST SERPL-MCNC: 122 MG/DL
CO2 SERPL-SCNC: 26 MMOL/L (ref 21–32)
CREAT SERPL-MCNC: 0.6 MG/DL (ref 0.6–1.3)
CRP SERPL QL: 5.2 MG/L
EOSINOPHIL # BLD AUTO: 0.11 THOUSAND/ΜL (ref 0–0.61)
EOSINOPHIL NFR BLD AUTO: 2 % (ref 0–6)
ERYTHROCYTE [DISTWIDTH] IN BLOOD BY AUTOMATED COUNT: 13.8 % (ref 11.6–15.1)
EST. AVERAGE GLUCOSE BLD GHB EST-MCNC: 192 MG/DL
GFR SERPL CREATININE-BSD FRML MDRD: 106 ML/MIN/1.73SQ M
GLUCOSE P FAST SERPL-MCNC: 172 MG/DL (ref 65–99)
HBA1C MFR BLD: 8.3 %
HBV SURFACE AG SER QL: NORMAL
HCT VFR BLD AUTO: 42.7 % (ref 34.8–46.1)
HCV AB SER QL: NORMAL
HDLC SERPL-MCNC: 36 MG/DL
HGB BLD-MCNC: 13.6 G/DL (ref 11.5–15.4)
IMM GRANULOCYTES # BLD AUTO: 0.03 THOUSAND/UL (ref 0–0.2)
IMM GRANULOCYTES NFR BLD AUTO: 1 % (ref 0–2)
LDLC SERPL CALC-MCNC: 55 MG/DL (ref 0–100)
LYMPHOCYTES # BLD AUTO: 1.21 THOUSANDS/ΜL (ref 0.6–4.47)
LYMPHOCYTES NFR BLD AUTO: 22 % (ref 14–44)
MCH RBC QN AUTO: 25.5 PG (ref 26.8–34.3)
MCHC RBC AUTO-ENTMCNC: 31.9 G/DL (ref 31.4–37.4)
MCV RBC AUTO: 80 FL (ref 82–98)
MONOCYTES # BLD AUTO: 0.33 THOUSAND/ΜL (ref 0.17–1.22)
MONOCYTES NFR BLD AUTO: 6 % (ref 4–12)
NEUTROPHILS # BLD AUTO: 3.92 THOUSANDS/ΜL (ref 1.85–7.62)
NEUTS SEG NFR BLD AUTO: 68 % (ref 43–75)
NONHDLC SERPL-MCNC: 86 MG/DL
NRBC BLD AUTO-RTO: 0 /100 WBCS
PLATELET # BLD AUTO: 170 THOUSANDS/UL (ref 149–390)
PMV BLD AUTO: 11.4 FL (ref 8.9–12.7)
POTASSIUM SERPL-SCNC: 4.2 MMOL/L (ref 3.5–5.3)
PROT SERPL-MCNC: 7.3 G/DL (ref 6.4–8.4)
RBC # BLD AUTO: 5.33 MILLION/UL (ref 3.81–5.12)
RHEUMATOID FACT SER QL LA: NEGATIVE
RPR SER QL: NORMAL
SODIUM SERPL-SCNC: 137 MMOL/L (ref 135–147)
TRIGL SERPL-MCNC: 157 MG/DL
TSH SERPL DL<=0.05 MIU/L-ACNC: 2.12 UIU/ML (ref 0.45–5.33)
WBC # BLD AUTO: 5.64 THOUSAND/UL (ref 4.31–10.16)

## 2022-03-04 PROCEDURE — 86803 HEPATITIS C AB TEST: CPT

## 2022-03-04 PROCEDURE — 86618 LYME DISEASE ANTIBODY: CPT

## 2022-03-04 PROCEDURE — 87340 HEPATITIS B SURFACE AG IA: CPT

## 2022-03-04 PROCEDURE — 86038 ANTINUCLEAR ANTIBODIES: CPT

## 2022-03-04 PROCEDURE — 86430 RHEUMATOID FACTOR TEST QUAL: CPT

## 2022-03-04 PROCEDURE — 80061 LIPID PANEL: CPT

## 2022-03-04 PROCEDURE — 86140 C-REACTIVE PROTEIN: CPT

## 2022-03-04 PROCEDURE — 36415 COLL VENOUS BLD VENIPUNCTURE: CPT

## 2022-03-04 PROCEDURE — 86696 HERPES SIMPLEX TYPE 2 TEST: CPT

## 2022-03-04 PROCEDURE — 83036 HEMOGLOBIN GLYCOSYLATED A1C: CPT

## 2022-03-04 PROCEDURE — 82306 VITAMIN D 25 HYDROXY: CPT

## 2022-03-04 PROCEDURE — 86695 HERPES SIMPLEX TYPE 1 TEST: CPT

## 2022-03-04 PROCEDURE — 84443 ASSAY THYROID STIM HORMONE: CPT

## 2022-03-04 PROCEDURE — 80053 COMPREHEN METABOLIC PANEL: CPT

## 2022-03-04 PROCEDURE — 87389 HIV-1 AG W/HIV-1&-2 AB AG IA: CPT

## 2022-03-04 PROCEDURE — 86592 SYPHILIS TEST NON-TREP QUAL: CPT

## 2022-03-04 PROCEDURE — 85025 COMPLETE CBC W/AUTO DIFF WBC: CPT

## 2022-03-05 LAB
B BURGDOR IGG+IGM SER-ACNC: 8
HSV1 IGG SER IA-ACNC: 16.9 INDEX (ref 0–0.9)
HSV2 IGG SER IA-ACNC: 6.68 INDEX (ref 0–0.9)

## 2022-03-06 LAB — HIV 1+2 AB+HIV1 P24 AG SERPL QL IA: NORMAL

## 2022-03-07 LAB — RYE IGE QN: NEGATIVE

## 2022-03-30 ENCOUNTER — HOSPITAL ENCOUNTER (EMERGENCY)
Facility: HOSPITAL | Age: 52
Discharge: HOME | End: 2022-03-30
Attending: INTERNAL MEDICINE | Admitting: INTERNAL MEDICINE
Payer: COMMERCIAL

## 2022-03-30 VITALS
TEMPERATURE: 97.6 F | HEIGHT: 66 IN | OXYGEN SATURATION: 100 % | SYSTOLIC BLOOD PRESSURE: 126 MMHG | DIASTOLIC BLOOD PRESSURE: 77 MMHG | HEART RATE: 99 BPM | WEIGHT: 163 LBS | RESPIRATION RATE: 20 BRPM | BODY MASS INDEX: 26.2 KG/M2

## 2022-03-30 DIAGNOSIS — S76.911A MUSCLE STRAIN OF RIGHT THIGH, INITIAL ENCOUNTER: ICD-10-CM

## 2022-03-30 DIAGNOSIS — Z76.0 ENCOUNTER FOR MEDICATION REFILL: Primary | ICD-10-CM

## 2022-03-30 PROCEDURE — 99284 EMERGENCY DEPT VISIT MOD MDM: CPT | Performed by: PHYSICIAN ASSISTANT

## 2022-03-30 PROCEDURE — 99281 EMR DPT VST MAYX REQ PHY/QHP: CPT

## 2022-03-30 RX ORDER — QUETIAPINE FUMARATE 400 MG/1
400 TABLET, FILM COATED ORAL
Qty: 14 TABLET | Refills: 0 | Status: SHIPPED | OUTPATIENT
Start: 2022-03-30 | End: 2022-07-22 | Stop reason: SDUPTHER

## 2022-03-30 NOTE — DISCHARGE INSTRUCTIONS
Use Tylenol every 4 hours or Motrin every 6 hours; you can alternate the 2 medications taking something every 3 hours for pain  Ice to area to help with pain/swelling, then heat to relax musculature  Please follow-up with your health care provider for all chronic medication refills

## 2022-03-30 NOTE — ED PROVIDER NOTES
History  Chief Complaint   Patient presents with    Medication Refill     pt requesting quetiapine refill and codeine for leg pain after fall in hot tub, denies injury, reports right leg pain     Pt with Past Medical History: Asthma, Depression, Diabetes mellitus type 2, HTN,   Past Surgical History: KNEE ARTHROCENTESIS, MOUTH SURGERY      Presents to ED requesting quetiapine 400mg qhs refill, states she missed about 5 appointments with her prior mental health care provider so they discharged her from the office; she has been in the process of finding a new one, but ran out of her medications before she could,  so is asking for temporary refill until she can follow-up  Pt brings med bottle, filled by Dr Lea Cox, last filled 3/14, no refills  Patient also complaining of right posterior thigh pain 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 Taking?    ALPRAZolam (XANAX) 0 5 mg tablet   Yes No   Sig: Take 0 5 mg by mouth daily as needed   Aspirin Low Dose 81 MG chewable tablet   Yes No   Sig: Chew 81 mg daily Chew   Cholecalciferol (Vitamin D3) 50 MCG (2000 UT) TABS   Yes No   Sig: Take 2,000 Units by mouth daily   Lantus SoloStar 100 units/mL injection pen   Yes No   Sig: INJECT 28 UNIT BY SUBCUTANEOUS ROUTE EVERY DAY   QUEtiapine (SEROquel) 400 MG tablet   Yes No   Sig: Take 400 mg by mouth daily at bedtime   QUEtiapine (SEROquel) 400 MG tablet   No No   Sig: Take 1 tablet (400 mg total) by mouth daily at bedtime   lisinopril (ZESTRIL) 2 5 mg tablet   Yes No   Sig: Take 2 5 mg by mouth daily Patient not taking: Reported on 9/20/2021   metFORMIN (GLUCOPHAGE) 500 mg tablet   Yes No   Sig: Take 500 mg by mouth 2 (two) times a day with meals   rosuvastatin (CRESTOR) 10 MG tablet   Yes No   Sig: Take 10 mg by mouth daily      Facility-Administered Medications: None       Past Medical History:   Diagnosis Date    Asthma     Depression     Diabetes mellitus ([facility] [address] )     type 2    Hypertension        Past Surgical History:   Procedure Laterality Date    KNEE ARTHROCENTESIS      MOUTH SURGERY         Family History   Problem Relation Age of Onset    Diabetes Mother     Diabetes Father      I have reviewed and agree with the history as documented  E-Cigarette/Vaping    E-Cigarette Use Never User      E-Cigarette/Vaping Substances    THC No     CBD No      Social History     Tobacco Use    Smoking status: Current Every Day Smoker     Packs/day: 1 00     Types: Cigarettes    Smokeless tobacco: Never Used   Vaping Use    Vaping Use: Never used   Substance Use Topics    Alcohol use: Not Currently    Drug use: Never       Review of Systems   Constitutional: Negative for chills and fever  HENT: Negative for hearing loss and sore throat  Eyes: Negative for visual disturbance  Respiratory: Negative for cough and shortness of breath  Cardiovascular: Negative for chest pain and leg swelling  Gastrointestinal: Negative for abdominal pain, nausea and vomiting  Genitourinary: Negative for dysuria and frequency  Musculoskeletal: Positive for myalgias  Negative for arthralgias  Skin: Negative for pallor and rash  Neurological: Negative for weakness  Hematological: Does not bruise/bleed easily  Psychiatric/Behavioral: Negative for behavioral problems  All other systems reviewed and are negative  Physical Exam  Physical Exam  Vitals and nursing note reviewed  Constitutional:       General: She is not in acute distress  Appearance: Normal appearance  She is well-developed  HENT:      Head: Normocephalic and atraumatic  Right Ear: External ear normal       Left Ear: External ear normal       Nose: Nose normal       Mouth/Throat:      Mouth: Mucous membranes are moist       Pharynx: Oropharynx is clear  Eyes:      Conjunctiva/sclera: Conjunctivae normal    Cardiovascular:      Rate and Rhythm: Normal rate  Pulmonary:      Effort: Pulmonary effort is normal  No respiratory distress  Musculoskeletal:         General: Tenderness (Mild diffuse tenderness noted to right posterior thigh musculature) present  Normal range of motion  Cervical back: Normal range of motion  Comments: Ambulates    Skin:     General: Skin is warm and dry  Findings: No erythema or rash  Neurological:      General: No focal deficit present  Mental Status: She is alert and oriented to person, place, and time  Motor: No weakness        Gait: Gait normal    Psychiatric:         Behavior: Behavior normal          Vital Signs  ED Triage Vitals   Temperature Pulse Respirations Blood Pressure SpO2   03/30/22 1243 03/30/22 1243 03/30/22 1243 03/30/22 1244 03/30/22 1243   97 6 °F (36 4 °C) 99 20 126/77 100 %      Temp Source Heart Rate Source Patient Position - Orthostatic VS BP Location FiO2 (%)   03/30/22 1243 03/30/22 1243 03/30/22 1243 03/30/22 1243 --   Oral Monitor Lying Left arm       Pain Score       03/30/22 1243       No Pain           Vitals:    03/30/22 1243 03/30/22 1244   BP:  126/77   Pulse: 99    Patient Position - Orthostatic VS: Lying          Visual Acuity      ED Medications  Medications - No data to display    Diagnostic Studies  Results Reviewed     None                 No orders to display              Procedures  Procedures         ED Course                                             MDM  Number of Diagnoses or Management Options  Encounter for medication refill  Muscle strain of right thigh, initial encounter  Diagnosis management comments: Patient with muscle strains, will treat conservatively with Tylenol, Motrin, ice, heat, stretching  Will give short, 2 week refill of medications, explained to patient that she needs follow-up with her primary care/mental health providers for chronic medication refills       Amount and/or Complexity of Data Reviewed  Review and summarize past medical records: yes        Disposition  Final diagnoses:   Encounter for medication refill   Muscle strain of right thigh, initial encounter     Time reflects when diagnosis was documented in both MDM as applicable and the Disposition within this note     Time User Action Codes Description Comment    3/30/2022 12:54 PM Stanton Juárez Add [Z76 0] Encounter for medication refill     3/30/2022 12:55 PM Annmarie White [address]  [H39 [id number]] Muscle strain of right thigh, initial encounter       ED Disposition     ED Disposition Condition Date/Time Comment    Discharge Stable Wed Mar 30, 2022 12:54 PM [facility] discharge to home/self care              Follow-up Information     Follow up With Specialties Details Why Contact Info    Jn Robertson, [address] [address],[facility] Floor [address]  [address] [place] [address]  [phone]      Your mental health provider              Current Discharge Medication List      CONTINUE these medications which have CHANGED    Details   QUEtiapine (SEROquel) 400 MG tablet Take 1 tablet (400 mg total) by mouth daily at bedtime  Qty: 14 tablet, Refills: 0    Associated Diagnoses: Encounter for medication refill         CONTINUE these medications which have NOT CHANGED    Details   ALPRAZolam (XANAX) 0 5 mg tablet Take 0 5 mg by mouth daily as needed      Aspirin Low Dose 81 MG chewable tablet Chew 81 mg daily Chew      Cholecalciferol (Vitamin D3) 50 MCG (2000 UT) TABS Take 2,000 Units by mouth daily      Lantus SoloStar 100 units/mL injection pen INJECT 28 UNIT BY SUBCUTANEOUS ROUTE EVERY DAY      lisinopril (ZESTRIL) 2 5 mg tablet Take 2 5 mg by mouth daily      metFORMIN (GLUCOPHAGE) 500 mg tablet Take 500 mg by mouth 2 (two) times a day with meals      rosuvastatin (CRESTOR) 10 MG tablet Take 10 mg by mouth daily             No discharge procedures on file      PDMP Review     None          ED Provider  Electronically Signed by           Sharla Johnston PA-C  03/30/22 7670

## 2022-03-30 NOTE — ED NOTES
Discharge instructions reviewed with pt  Pt verbalized understanding  And has no further questions at this time         Aleisha Ferrell RN  03/30/22 0391

## 2022-06-07 ENCOUNTER — OFFICE VISIT (OUTPATIENT)
Dept: OBGYN CLINIC | Facility: CLINIC | Age: 52
End: 2022-06-07
Payer: COMMERCIAL

## 2022-06-07 VITALS
SYSTOLIC BLOOD PRESSURE: 124 MMHG | HEIGHT: 66 IN | WEIGHT: 180.6 LBS | DIASTOLIC BLOOD PRESSURE: 80 MMHG | BODY MASS INDEX: 29.02 KG/M2

## 2022-06-07 DIAGNOSIS — N89.8 VAGINAL ODOR: ICD-10-CM

## 2022-06-07 DIAGNOSIS — Z12.11 SCREEN FOR COLON CANCER: ICD-10-CM

## 2022-06-07 DIAGNOSIS — N89.8 VAGINAL ITCHING: ICD-10-CM

## 2022-06-07 DIAGNOSIS — R10.84 GENERALIZED ABDOMINAL PAIN: Primary | ICD-10-CM

## 2022-06-07 DIAGNOSIS — Z11.3 SCREEN FOR STD (SEXUALLY TRANSMITTED DISEASE): ICD-10-CM

## 2022-06-07 DIAGNOSIS — K92.1 BLACK STOOL: ICD-10-CM

## 2022-06-07 PROCEDURE — 87480 CANDIDA DNA DIR PROBE: CPT | Performed by: PHYSICIAN ASSISTANT

## 2022-06-07 PROCEDURE — 99213 OFFICE O/P EST LOW 20 MIN: CPT | Performed by: PHYSICIAN ASSISTANT

## 2022-06-07 PROCEDURE — 87491 CHLMYD TRACH DNA AMP PROBE: CPT | Performed by: PHYSICIAN ASSISTANT

## 2022-06-07 PROCEDURE — 87591 N.GONORRHOEAE DNA AMP PROB: CPT | Performed by: PHYSICIAN ASSISTANT

## 2022-06-07 PROCEDURE — 87660 TRICHOMONAS VAGIN DIR PROBE: CPT | Performed by: PHYSICIAN ASSISTANT

## 2022-06-07 PROCEDURE — 87510 GARDNER VAG DNA DIR PROBE: CPT | Performed by: PHYSICIAN ASSISTANT

## 2022-06-07 NOTE — PROGRESS NOTES
Assessment/Plan:     Diagnoses and all orders for this visit:    Generalized abdominal pain  -     Ambulatory Referral to Gastroenterology; Future  -     Chlamydia/GC amplified DNA by PCR  -     VAGINOSIS DNA PROBE (AFFIRM)    Black stool  -     Ambulatory Referral to Gastroenterology; Future    Vaginal odor  -     Chlamydia/GC amplified DNA by PCR  -     VAGINOSIS DNA PROBE (AFFIRM)    Vaginal itching  -     Chlamydia/GC amplified DNA by PCR  -     VAGINOSIS DNA PROBE (AFFIRM)    Screen for colon cancer  -     Ambulatory Referral to Gastroenterology; Future    Screen for STD (sexually transmitted disease)  -     Chlamydia/GC amplified DNA by PCR  -     VAGINOSIS DNA PROBE (AFFIRM)      a 81-year-old female presenting today requesting to be tested for bacteria as she states she has had abdominal pain and black stool  Details of our discussion as below  She mentions recurrent BV however I reviewed previous vaginal culture results in January, the 1st year in September and a parole all which were negative  After further clarification of being tested for bacteria she does state that she has had some vaginal itching and some water  GC/CT and a firm collected today though no gross abnormalities on examination  We will call her with these results  Further discussion had regarding her abdominal pain which seems to be generalized and in multiple different areas of her abdomen but no specific pelvic pain  No urinary symptoms  She did report black stool however she seems to be taking something similar to Pepto-Bismol for some time which could turn her stool darker in color  He either way since her abdominal pain/bloating symptoms continue in reporting black stool certainly GI bleed and intestinal abnormality needs to be ruled out  Also she is 49years old and has not yet had a screening colonoscopy  Referral provided to Gastroenterology and urge patient to call them this week to schedule an appointment  Patient also mentioned she was going to go to the emergency room on Friday to have tests for cancer and heart disease and had a chest x-ray because her asthma is not doing so well  Patient is stable and does not seem to be in any respiratory distress  She states that her inhaler is not working as well as they used to  No obvious audible or auscultated wheezing or significant reduction in breath sounds noted  She is not coughing and does not have a fever  At this point I did explain the patient proper utilization of the emergency room if she feels as though her asthma is emergent matter and she is having difficulty breathing certainly going to the ED or Urgent Care would be completely appropriate  However I would not advise just going to the emergency room whenever she has a palpable just to get checked and her chest x-ray for different things  I encouraged patient to schedule a follow-up with her primary care sometime this week if possible so they can assess her asthma indeterminate chest x-ray appropriate  I also explained to patient proper utilization of chest x-ray which is not the best test for evaluating for heart disease and certain cancers  She is a smoker and may qualify for low-dose CT lung screening program and should have this conversation with her primary care  Also she should have a conversation with PCP regarding her concern for heart disease as well  We also had discussion regarding her weight as patient reported that she keeps getting all these medications for her diabetes  I explained to her the importance of also managing her diabetes with a strict diabetic diet and exercise and weight loss which certainly can all prevent progression of her diabetes and the need for more medication  Again patient encouraged to schedule follow-up with PCP to discuss concerns as well as Gastroenterology [facility] P  We will call her with culture results    She is already scheduled for her annual with Dr Dino Powell in September and encouraged her to keep this appointment  Chief Complaint   Patient presents with    Exposure to STD       Subjective:      Patient ID: Penny Michelle is a 49 y o  female     56y/o F presenting today desiring testing for "bacteria "  States having abdominal pain x 2 months and now black stool  States she often feels bloated and was having diarrhea episodes and started taking something she thinks is similar to pepto bismul OTC  several times a week  She has not seen GI or discussed this w/ her PCP  Concerned her abd pain may be from bacteria  States w/ same sexual partner  States hx of recurrent BV  States has some vaginal  itching and odor  Denies significant abnormal discharge or pelvic pain  Abd pain is generalized and multiple different spots that can occur together or separate  Denies N/V, does note occasional heartburn  She is urinating normally  She also states she is planning on going to ED on Friday to have chest xray b/c asthma acting up and wants to be checked for heart disease and cancer  States she can have test for abd pain at that time  The following portions of the patient's history were reviewed and updated as appropriate: allergies, current medications, past medical history, past social history and problem list     Review of Systems   Constitutional: Negative  Negative for activity change, appetite change, chills, fatigue, fever and unexpected weight change  Respiratory: Positive for chest tightness (slight w/ asthma, states inhaler not working  )  Negative for cough  Cardiovascular: Negative for chest pain, palpitations and leg swelling  Gastrointestinal:        As in HPI   Genitourinary:        As in HPI         Objective:      /80 (BP Location: Left arm, Patient Position: Sitting, Cuff Size: Standard)   Ht 5' 6" (1 676 m)   Wt 81 9 kg (180 lb 9 6 oz)   BMI 29 15 kg/m²          Physical Exam  Vitals reviewed  Constitutional:       General: She is not in acute distress  Appearance: Normal appearance  She is not ill-appearing or toxic-appearing  Cardiovascular:      Rate and Rhythm: Normal rate and regular rhythm  Pulmonary:      Effort: Pulmonary effort is normal  No respiratory distress  Breath sounds: Normal breath sounds  No wheezing  Abdominal:      General: There is no distension  Tenderness: There is abdominal tenderness (mild TTP in all quadrants reported by pt not localized  )  There is no guarding or rebound  Genitourinary:     General: Normal vulva  Pubic Area: No rash  Labia:         Right: No rash, tenderness or lesion  Left: No rash, tenderness or lesion  Vagina: No vaginal discharge, erythema, tenderness, bleeding or lesions  Cervix: No cervical motion tenderness or discharge  Uterus: Not tender  Adnexa:         Right: No tenderness  Left: No tenderness  Comments: No TTP on bimanual exam   Lymphadenopathy:      Lower Body: No right inguinal adenopathy  No left inguinal adenopathy  Neurological:      Mental Status: She is alert and oriented to person, place, and time  Psychiatric:         Mood and Affect: Mood normal          Behavior: Behavior normal  Behavior is cooperative

## 2022-06-08 ENCOUNTER — TELEPHONE (OUTPATIENT)
Dept: OTHER | Facility: OTHER | Age: 52
End: 2022-06-08

## 2022-06-08 NOTE — TELEPHONE ENCOUNTER
Patient called stating she has a referral to have a colonoscopy done and she would like to schedule

## 2022-06-09 NOTE — TELEPHONE ENCOUNTER
Patient is scheduled for colonoscopy on June 25 , 2022 at [facility] with Timo Park MD  Patient is aware of pre-procedure prep of Miralax/ Magnesium Citrate and they will be called the day prior between 2 and 6 pm for time to report for procedure  Pre-procedure prep has been given to the patient  via E-mail on June 9 , 2022   PT CONFIRMED SHE RECEIVED THE EMAILED BOWEL PREP INSTRUCTIONS

## 2022-06-24 ENCOUNTER — NURSE TRIAGE (OUTPATIENT)
Dept: OTHER | Facility: OTHER | Age: 52
End: 2022-06-24

## 2022-06-24 ENCOUNTER — TELEPHONE (OUTPATIENT)
Dept: GASTROENTEROLOGY | Facility: AMBULARY SURGERY CENTER | Age: 52
End: 2022-06-24

## 2022-06-24 RX ORDER — ONDANSETRON 2 MG/ML
4 INJECTION INTRAMUSCULAR; INTRAVENOUS ONCE AS NEEDED
Status: CANCELLED | OUTPATIENT
Start: 2022-06-24

## 2022-06-24 RX ORDER — METOCLOPRAMIDE HYDROCHLORIDE 5 MG/ML
10 INJECTION INTRAMUSCULAR; INTRAVENOUS ONCE AS NEEDED
Status: CANCELLED | OUTPATIENT
Start: 2022-06-24

## 2022-06-24 RX ORDER — LIDOCAINE HYDROCHLORIDE 10 MG/ML
0.5 INJECTION, SOLUTION EPIDURAL; INFILTRATION; INTRACAUDAL; PERINEURAL ONCE AS NEEDED
Status: CANCELLED | OUTPATIENT
Start: 2022-06-24

## 2022-06-24 RX ORDER — DIPHENHYDRAMINE HYDROCHLORIDE 50 MG/ML
12.5 INJECTION INTRAMUSCULAR; INTRAVENOUS ONCE AS NEEDED
Status: CANCELLED | OUTPATIENT
Start: 2022-06-24

## 2022-06-24 RX ORDER — SODIUM CHLORIDE, SODIUM LACTATE, POTASSIUM CHLORIDE, CALCIUM CHLORIDE 600; 310; 30; 20 MG/100ML; MG/100ML; MG/100ML; MG/100ML
125 INJECTION, SOLUTION INTRAVENOUS CONTINUOUS
Status: CANCELLED | OUTPATIENT
Start: 2022-06-24

## 2022-06-24 NOTE — TELEPHONE ENCOUNTER
Triage RN reviewed liquid diet today and to allow for some sugar drinks to keep blood sugars in range  Patient takes insulin daily after morning meal  Reviewed with patient that she will be NPO after midnight and no morning meal  Advised to check blood sugar in the AM to make sure she is not tool low or high  Patient verbalized understanding  Please follow up with patient to confirm insulin instructions and liquid diet  Reason for Disposition   Bowel prep for colonoscopy, questions about    Answer Assessment - Initial Assessment Questions  1  DATE/TIME: "When did you have your colonoscopy?"       Scheduled 6/25/22    2  MAIN CONCERN: "What is your main concern right now?" "What questions do you have?"      Do I take my insulin tomorrow before coming in for procedure?     Protocols used: COLONOSCOPY SYMPTOMS AND QUESTIONS-ADULT-

## 2022-06-24 NOTE — TELEPHONE ENCOUNTER
Spoke w/ pt again/PT CONFIRMED SHE RECEIVED EMAILED BOWEL PREP INSTRUCTIONS AGAIN/miralax/mag citrate

## 2022-06-24 NOTE — TELEPHONE ENCOUNTER
Regarding: Colonoscopy prep / insulin  ----- Message from [facility] sent at 6/24/2022  2:06 PM EDT -----  "Should I take insulin before my colonoscopy"

## 2022-06-25 ENCOUNTER — ANESTHESIA EVENT (OUTPATIENT)
Dept: GASTROENTEROLOGY | Facility: HOSPITAL | Age: 52
End: 2022-06-25

## 2022-06-25 ENCOUNTER — ANESTHESIA (OUTPATIENT)
Dept: GASTROENTEROLOGY | Facility: HOSPITAL | Age: 52
End: 2022-06-25

## 2022-06-25 ENCOUNTER — HOSPITAL ENCOUNTER (OUTPATIENT)
Dept: GASTROENTEROLOGY | Facility: HOSPITAL | Age: 52
Setting detail: OUTPATIENT SURGERY
Discharge: HOME | End: 2022-06-25
Attending: INTERNAL MEDICINE
Payer: COMMERCIAL

## 2022-06-25 VITALS
SYSTOLIC BLOOD PRESSURE: 111 MMHG | TEMPERATURE: 98.3 F | DIASTOLIC BLOOD PRESSURE: 62 MMHG | RESPIRATION RATE: 18 BRPM | OXYGEN SATURATION: 99 % | HEART RATE: 82 BPM

## 2022-06-25 DIAGNOSIS — Z12.11 SCREEN FOR COLON CANCER: ICD-10-CM

## 2022-06-25 LAB — GLUCOSE SERPL-MCNC: 136 MG/DL (ref 65–140)

## 2022-06-25 PROCEDURE — G0105 COLORECTAL SCRN; HI RISK IND: HCPCS | Performed by: INTERNAL MEDICINE

## 2022-06-25 PROCEDURE — 82948 REAGENT STRIP/BLOOD GLUCOSE: CPT

## 2022-06-25 RX ORDER — ONDANSETRON 2 MG/ML
INJECTION INTRAMUSCULAR; INTRAVENOUS AS NEEDED
Status: DISCONTINUED | OUTPATIENT
Start: 2022-06-25 | End: 2022-06-25

## 2022-06-25 RX ORDER — SODIUM CHLORIDE, SODIUM LACTATE, POTASSIUM CHLORIDE, CALCIUM CHLORIDE 600; 310; 30; 20 MG/100ML; MG/100ML; MG/100ML; MG/100ML
INJECTION, SOLUTION INTRAVENOUS CONTINUOUS PRN
Status: DISCONTINUED | OUTPATIENT
Start: 2022-06-25 | End: 2022-06-25

## 2022-06-25 RX ORDER — PROPOFOL 10 MG/ML
INJECTION, EMULSION INTRAVENOUS AS NEEDED
Status: DISCONTINUED | OUTPATIENT
Start: 2022-06-25 | End: 2022-06-25

## 2022-06-25 RX ORDER — PROPOFOL 10 MG/ML
INJECTION, EMULSION INTRAVENOUS CONTINUOUS PRN
Status: DISCONTINUED | OUTPATIENT
Start: 2022-06-25 | End: 2022-06-25

## 2022-06-25 RX ADMIN — PROPOFOL 150 MG: 10 INJECTION, EMULSION INTRAVENOUS at 11:08

## 2022-06-25 RX ADMIN — PROPOFOL 150 MCG/KG/MIN: 10 INJECTION, EMULSION INTRAVENOUS at 11:08

## 2022-06-25 RX ADMIN — SODIUM CHLORIDE, SODIUM LACTATE, POTASSIUM CHLORIDE, AND CALCIUM CHLORIDE: .6; .31; .03; .02 INJECTION, SOLUTION INTRAVENOUS at 11:06

## 2022-06-25 RX ADMIN — PROPOFOL 50 MG: 10 INJECTION, EMULSION INTRAVENOUS at 11:16

## 2022-06-25 RX ADMIN — ONDANSETRON 4 MG: 2 INJECTION INTRAMUSCULAR; INTRAVENOUS at 11:08

## 2022-06-25 NOTE — ANESTHESIA PREPROCEDURE EVALUATION
Procedure:  COLONOSCOPY    Relevant Problems   ANESTHESIA (within normal limits)      CARDIO   (+) Hypertension      ENDO (within normal limits)      GI/HEPATIC (within normal limits)      /RENAL (within normal limits)      GYN (within normal limits)      HEMATOLOGY (within normal limits)      MUSCULOSKELETAL (within normal limits)      NEURO/PSYCH   (+) Depression      PULMONARY   (+) Asthma      Digestive   (+) Rectocele      Endocrine   (+) Diabetes mellitus (HCC)        Physical Exam    Airway    Mallampati score: II  TM Distance: >3 FB  Neck ROM: full     Dental   No notable dental hx     Cardiovascular  Rhythm: regular, Rate: normal, Cardiovascular exam normal    Pulmonary  Pulmonary exam normal Breath sounds clear to auscultation,     Other Findings        Anesthesia Plan  ASA Score- 2     Anesthesia Type- IV sedation with anesthesia with ASA Monitors  Additional Monitors:   Airway Plan:           Plan Factors-    Chart reviewed  Existing labs reviewed  Patient summary reviewed  Induction- intravenous  Postoperative Plan-     Informed Consent- Anesthetic plan and risks discussed with patient

## 2022-06-25 NOTE — H&P
History and Physical -  [facility]  Da Mclaughlin 49 y o  female MRN: [id number]        HPI:  56-year-old female with history diabetes mellitus, depression, hypertension was referred for colonoscopy  Her brother had colon cancer  Regular bowel movements  Historical Information   Past Medical History:   Diagnosis Date    Asthma     Depression     Diabetes mellitus ([address] )     type 2    Hypertension      Past Surgical History:   Procedure Laterality Date    KNEE ARTHROCENTESIS      MOUTH SURGERY       Social History   Social History     Substance and Sexual Activity   Alcohol Use Not Currently     Social History     Substance and Sexual Activity   Drug Use Never     Social History     Tobacco Use   Smoking Status Current Every Day Smoker    Packs/day: 1 00    Types: Cigarettes   Smokeless Tobacco Never Used     Family History   Problem Relation Age of Onset    Diabetes Mother     Diabetes Father     Colon cancer Brother        Meds/Allergies     (Not in a hospital admission)      No Known Allergies    Objective     Blood pressure 129/63, pulse 92, temperature (!) 97 3 °F (36 3 °C), temperature source Temporal, resp  rate 18, SpO2 96 %      PHYSICAL EXAM:    Gen: NAD  CV: S1 & S2 normal, RRR  CHEST: Clear to auscultate  ABD: soft, NT/ND, good bowel sounds  EXT: no edema    ASSESSMENT:     Family history of colon cancer    PLAN:    Colonoscopy

## 2022-07-22 ENCOUNTER — HOSPITAL ENCOUNTER (EMERGENCY)
Facility: HOSPITAL | Age: 52
Discharge: HOME | End: 2022-07-22
Attending: EMERGENCY MEDICINE | Admitting: EMERGENCY MEDICINE
Payer: COMMERCIAL

## 2022-07-22 VITALS
OXYGEN SATURATION: 98 % | RESPIRATION RATE: 18 BRPM | TEMPERATURE: 97.9 F | SYSTOLIC BLOOD PRESSURE: 140 MMHG | DIASTOLIC BLOOD PRESSURE: 88 MMHG | HEART RATE: 87 BPM

## 2022-07-22 DIAGNOSIS — Z76.0 MEDICATION REFILL: Primary | ICD-10-CM

## 2022-07-22 DIAGNOSIS — Z76.0 ENCOUNTER FOR MEDICATION REFILL: ICD-10-CM

## 2022-07-22 PROCEDURE — 99284 EMERGENCY DEPT VISIT MOD MDM: CPT | Performed by: EMERGENCY MEDICINE

## 2022-07-22 PROCEDURE — 99283 EMERGENCY DEPT VISIT LOW MDM: CPT

## 2022-07-22 RX ORDER — QUETIAPINE FUMARATE 400 MG/1
400 TABLET, FILM COATED ORAL
Qty: 90 TABLET | Refills: 0 | Status: SHIPPED | OUTPATIENT
Start: 2022-07-22 | End: 2022-08-22 | Stop reason: SDUPTHER

## 2022-07-22 NOTE — DISCHARGE INSTRUCTIONS
It is very important that you follow-up with a psychiatrist for long-term prescription medications  Please make an appointment as soon as possible

## 2022-07-22 NOTE — ED PROVIDER NOTES
History  Chief Complaint   Patient presents with    Medication Problem     Pt presents to ED with c/o being out of her nightly seroquel, states she hasn't seen a therapist to prescribe her medication, would like to get off medication completely  The patient tells me that she has been taking Seroquel for many years  She has not been able to get a refill for the past 6 months because she stop seeing her psychiatrist   Since then she has been trying to make psychiatry appointments with every place that she causes a is a 6 month waiting list   She tells me that she takes Seroquel for insomnia  She also notes that she has a history of bipolar disorder  She tells me it is very well controlled and she has not had a severe episode for almost 20 years  She tells me he currently feels like her mood is well controlled  She is not having racing or intrusive thoughts  She is not feeling sad or suicidal   She is not having impulse control issues  Patient incidentally notes that she had abdominal pain several weeks ago  She tells me that she was follow-up by GI for this  She underwent a colonoscopy which was negative and has not had any abdominal pain since  The pain would occur randomly approximately once a week and and last for about 20 minutes or so on bilateral lower abdomen  She has no pain today nor has she for several weeks          Prior to Admission Medications   Prescriptions Last Dose Informant Patient Reported? Taking?    ALPRAZolam (XANAX) 0 5 mg tablet   Yes No   Sig: Take 0 5 mg by mouth daily as needed   Aspirin Low Dose 81 MG chewable tablet   Yes No   Sig: Chew 81 mg daily Chew   Cholecalciferol (Vitamin D3) 50 MCG (2000 UT) TABS   Yes No   Sig: Take 2,000 Units by mouth daily   Lantus SoloStar 100 units/mL injection pen   Yes No   Sig: INJECT 28 UNIT BY SUBCUTANEOUS ROUTE EVERY DAY   QUEtiapine (SEROquel) 400 MG tablet   No No   Sig: Take 1 tablet (400 mg total) by mouth daily at bedtime QUEtiapine (SEROquel) 400 MG tablet   No Yes   Sig: Take 1 tablet (400 mg total) by mouth daily at bedtime   lisinopril (ZESTRIL) 2 5 mg tablet   Yes No   Sig: Take 2 5 mg by mouth daily   metFORMIN (GLUCOPHAGE) 500 mg tablet   Yes No   Sig: Take 500 mg by mouth 2 (two) times a day with meals   rosuvastatin (CRESTOR) 10 MG tablet   Yes No   Sig: Take 10 mg by mouth daily      Facility-Administered Medications: None       Past Medical History:   Diagnosis Date    Asthma     Depression     Diabetes mellitus ([facility] [address] )     type 2    Hypertension        Past Surgical History:   Procedure Laterality Date    KNEE ARTHROCENTESIS      MOUTH SURGERY         Family History   Problem Relation Age of Onset    Diabetes Mother     Diabetes Father     Colon cancer Brother      I have reviewed and agree with the history as documented  E-Cigarette/Vaping    E-Cigarette Use Never User      E-Cigarette/Vaping Substances    THC No     CBD No      Social History     Tobacco Use    Smoking status: Current Every Day Smoker     Packs/day: 1 00     Types: Cigarettes    Smokeless tobacco: Never Used   Vaping Use    Vaping Use: Never used   Substance Use Topics    Alcohol use: Not Currently    Drug use: Never       Review of Systems   All other systems reviewed and are negative  Physical Exam  Physical Exam  Vitals and nursing note reviewed  Constitutional:       General: She is not in acute distress  Appearance: She is well-developed  HENT:      Head: Normocephalic and atraumatic  Eyes:      Conjunctiva/sclera: Conjunctivae normal    Cardiovascular:      Rate and Rhythm: Normal rate and regular rhythm  Heart sounds: No murmur heard  Pulmonary:      Effort: Pulmonary effort is normal  No respiratory distress  Breath sounds: Normal breath sounds  Abdominal:      Palpations: Abdomen is soft  Tenderness: There is no abdominal tenderness  Musculoskeletal:      Cervical back: Neck supple  Skin:     General: Skin is warm and dry  Neurological:      Mental Status: She is alert  Psychiatric:         Mood and Affect: Mood normal          Thought Content: Thought content normal          Vital Signs  ED Triage Vitals [07/22/22 1642]   Temperature Pulse Respirations Blood Pressure SpO2   97 9 °F (36 6 °C) 87 18 140/88 98 %      Temp Source Heart Rate Source Patient Position - Orthostatic VS BP Location FiO2 (%)   Oral Monitor -- -- --      Pain Score       --           Vitals:    07/22/22 1642   BP: 140/88   Pulse: 87         Visual Acuity      ED Medications  Medications - No data to display    Diagnostic Studies  Results Reviewed     None                 No orders to display              Procedures  Procedures         ED Course                                             MDM  Number of Diagnoses or Management Options  Medication refill  Diagnosis management comments: I evaluate the patient  I will continue her medication and due to her history of psychiatric disorder to avoid having her get destabilize while awaiting outpatient follow-up, nevertheless, I greatly emphasized importance of follow-up  Patient verbalized understanding and agreed  I also will place order to facilitate psychiatric follow-up  Disposition  Final diagnoses:   Medication refill     Time reflects when diagnosis was documented in both MDM as applicable and the Disposition within this note     Time User Action Codes Description Comment    7/22/2022  4:55 PM Connor Mullins Add [Z76 0] Medication refill     7/22/2022  4:55 PM Connor Mullins Add [Z76 0] Encounter for medication refill       ED Disposition     ED Disposition   Discharge    Condition   Stable    Date/Time   Fri Jul 22, 2022  4:57 PM    Comment   Xavier Hinds discharge to home/self care                 Follow-up Information     Follow up With Specialties Details Why Contact Info Additional [address] [facility]   [address] [facility]  [address]  [address] [facility], [address] [place] [address], [address] [place] [address], [address] [phone]          Discharge Medication List as of 7/22/2022  4:57 PM      CONTINUE these medications which have CHANGED    Details   QUEtiapine (SEROquel) 400 MG tablet Take 1 tablet (400 mg total) by mouth daily at bedtime, Starting Fri 7/22/2022, Until Thu 10/20/2022, Normal         CONTINUE these medications which have NOT CHANGED    Details   ALPRAZolam (XANAX) 0 5 mg tablet Take 0 5 mg by mouth daily as needed, Starting Sat 3/13/2021, Historical Med      Aspirin Low Dose 81 MG chewable tablet Chew 81 mg daily Chew, Starting Thu 4/1/2021, Historical Med      Cholecalciferol (Vitamin D3) 50 MCG (2000 UT) TABS Take 2,000 Units by mouth daily, Starting Wed 2/24/2021, Historical Med      Lantus SoloStar 100 units/mL injection pen INJECT 28 UNIT BY SUBCUTANEOUS ROUTE EVERY DAY, Historical Med      lisinopril (ZESTRIL) 2 5 mg tablet Take 2 5 mg by mouth daily, Starting Wed 3/24/2021, Historical Med      metFORMIN (GLUCOPHAGE) 500 mg tablet Take 500 mg by mouth 2 (two) times a day with meals, Historical Med      rosuvastatin (CRESTOR) 10 MG tablet Take 10 mg by mouth daily, Starting Thu 4/1/2021, Historical Med                 PDMP Review     None          ED Provider  Electronically Signed by           Amilcar Ruano MD  07/22/22 3642

## 2022-07-27 ENCOUNTER — TELEPHONE (OUTPATIENT)
Dept: PSYCHIATRY | Facility: CLINIC | Age: 52
End: 2022-07-27

## 2022-07-27 NOTE — TELEPHONE ENCOUNTER
contacted patient in regards to ASAP referral and to attempt to schedule accordingly  unable to leave vm for patient to contact intake dept  vm not set up
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
## 2022-08-01 ENCOUNTER — TELEPHONE (OUTPATIENT)
Dept: PSYCHIATRY | Facility: CLINIC | Age: 52
End: 2022-08-01

## 2022-08-01 NOTE — TELEPHONE ENCOUNTER
When spoke w  Patient Laurie Leyva did make it aware that it would be up to provider on how many times patient will be seen monthly and if they would see them virtually or in person  Patient stated that that was understanding and also writer also noted that if patient was feeling any active suicidal thoughts that she should call the suicidal hotline immediately or take herself to the ER if it becomes to much patient stated she understood the suggestions
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
## 2022-08-01 NOTE — TELEPHONE ENCOUNTER
contacted patient in regards to ASAP referral and in attempts to schedule pt with Rajesh Vargas  Spoke w  patient whom will call intake dept when she is in secure area

## 2022-08-22 ENCOUNTER — HOSPITAL ENCOUNTER (EMERGENCY)
Facility: HOSPITAL | Age: 52
Discharge: HOME | End: 2022-08-22
Attending: EMERGENCY MEDICINE | Admitting: EMERGENCY MEDICINE
Payer: COMMERCIAL

## 2022-08-22 VITALS
OXYGEN SATURATION: 98 % | RESPIRATION RATE: 17 BRPM | SYSTOLIC BLOOD PRESSURE: 136 MMHG | HEART RATE: 87 BPM | DIASTOLIC BLOOD PRESSURE: 94 MMHG | TEMPERATURE: 97.9 F | WEIGHT: 180 LBS | HEIGHT: 66 IN | BODY MASS INDEX: 28.93 KG/M2

## 2022-08-22 DIAGNOSIS — Z76.0 ENCOUNTER FOR MEDICATION REFILL: Primary | ICD-10-CM

## 2022-08-22 DIAGNOSIS — G47.00 INSOMNIA: ICD-10-CM

## 2022-08-22 PROCEDURE — 99284 EMERGENCY DEPT VISIT MOD MDM: CPT | Performed by: PHYSICIAN ASSISTANT

## 2022-08-22 PROCEDURE — 99281 EMR DPT VST MAYX REQ PHY/QHP: CPT

## 2022-08-22 RX ORDER — QUETIAPINE FUMARATE 400 MG/1
400 TABLET, FILM COATED ORAL
Qty: 14 TABLET | Refills: 0 | Status: SHIPPED | OUTPATIENT
Start: 2022-08-22 | End: 2022-09-05

## 2022-08-22 NOTE — ED PROVIDER NOTES
History  Chief Complaint   Patient presents with    Medication Refill     Pt presents to ed via walk in for a refill of her medications was seen and prescribed not able to get into her new dr for a couple weeks      Past Medical History: Asthma, Depression, Diabetes mellitus, HTN, Bipolar disorder, Insomnia  Past Surgical History: KNEE ARTHROCENTESIS, MOUTH SURGERY      Pt presents to ED requesting refill of her Seroquel 400mg qhs and Xanax medications  Pt tells me that she has been taking Seroquel/going to therapy for 21 yrs, and just "needed a break" so hasn't gone to psychiatry for the past few months, hasn't been able to get refills, but now has been having problems with sleeping  Does admit she got #2 rx filled from ED  She tells me that she takes Seroquel for insomnia  Pt reports mood is well controlled, some intermittent anxiety which is why she's asking for Xanax  Denies racing or intrusive thoughts, no SI/HI, not feeling sad, no impulse control issues  Prior to Admission Medications   Prescriptions Last Dose Informant Patient Reported? Taking?    ALPRAZolam (XANAX) 0 5 mg tablet   Yes No   Sig: Take 0 5 mg by mouth daily as needed   Aspirin Low Dose 81 MG chewable tablet   Yes No   Sig: Chew 81 mg daily Chew   Cholecalciferol (Vitamin D3) 50 MCG (2000 UT) TABS   Yes No   Sig: Take 2,000 Units by mouth daily   Lantus SoloStar 100 units/mL injection pen   Yes No   Sig: INJECT 28 UNIT BY SUBCUTANEOUS ROUTE EVERY DAY   QUEtiapine (SEROquel) 400 MG tablet   No No   Sig: Take 1 tablet (400 mg total) by mouth daily at bedtime   QUEtiapine (SEROquel) 400 MG tablet   No Yes   Sig: Take 1 tablet (400 mg total) by mouth daily at bedtime for 14 days   lisinopril (ZESTRIL) 2 5 mg tablet   Yes No   Sig: Take 2 5 mg by mouth daily   metFORMIN (GLUCOPHAGE) 500 mg tablet   Yes No   Sig: Take 500 mg by mouth 2 (two) times a day with meals   rosuvastatin (CRESTOR) 10 MG tablet   Yes No   Sig: Take 10 mg by mouth daily      Facility-Administered Medications: None       Past Medical History:   Diagnosis Date    Asthma     Depression     Diabetes mellitus ([address] )     type 2    Hypertension        Past Surgical History:   Procedure Laterality Date    KNEE ARTHROCENTESIS      MOUTH SURGERY         Family History   Problem Relation Age of Onset    Diabetes Mother     Diabetes Father     Colon cancer Brother      I have reviewed and agree with the history as documented  E-Cigarette/Vaping    E-Cigarette Use Never User      E-Cigarette/Vaping Substances    THC No     CBD No      Social History     Tobacco Use    Smoking status: Current Every Day Smoker     Packs/day: 1 00     Types: Cigarettes    Smokeless tobacco: Never Used   Vaping Use    Vaping Use: Never used   Substance Use Topics    Alcohol use: Not Currently    Drug use: Never       Review of Systems   Constitutional: Negative for chills and fever  HENT: Negative for hearing loss and sore throat  Respiratory: Negative for shortness of breath  Cardiovascular: Negative for chest pain  Musculoskeletal: Negative for myalgias  Neurological: Negative for dizziness and headaches  Psychiatric/Behavioral: Positive for sleep disturbance  Negative for behavioral problems, hallucinations and suicidal ideas  The patient is nervous/anxious  The patient is not hyperactive  All other systems reviewed and are negative  Physical Exam  Physical Exam  Vitals and nursing note reviewed  Constitutional:       General: She is not in acute distress  Appearance: Normal appearance  She is well-developed  HENT:      Head: Normocephalic and atraumatic  Right Ear: External ear normal       Left Ear: External ear normal       Nose: Nose normal       Mouth/Throat:      Mouth: Mucous membranes are moist       Pharynx: Oropharynx is clear     Eyes:      Conjunctiva/sclera: Conjunctivae normal    Cardiovascular:      Rate and Rhythm: Normal rate    Pulmonary:      Effort: Pulmonary effort is normal  No respiratory distress  Musculoskeletal:         General: No tenderness  Normal range of motion  Cervical back: Normal range of motion  Lymphadenopathy:      Cervical: No cervical adenopathy  Skin:     General: Skin is warm and dry  Capillary Refill: Capillary refill takes less than 2 seconds  Neurological:      General: No focal deficit present  Mental Status: She is alert and oriented to person, place, and time  Mental status is at baseline  Cranial Nerves: No cranial nerve deficit  Motor: No weakness  Psychiatric:         Mood and Affect: Mood normal          Behavior: Behavior normal          Vital Signs  ED Triage Vitals [08/22/22 1031]   Temperature Pulse Respirations Blood Pressure SpO2   97 9 °F (36 6 °C) 87 17 136/94 98 %      Temp Source Heart Rate Source Patient Position - Orthostatic VS BP Location FiO2 (%)   Oral Monitor Sitting Left arm --      Pain Score       No Pain           Vitals:    08/22/22 1031   BP: 136/94   Pulse: 87   Patient Position - Orthostatic VS: Sitting         Visual Acuity      ED Medications  Medications - No data to display    Diagnostic Studies  Results Reviewed     None                 No orders to display              Procedures  Procedures         ED Course                                             MDM  Number of Diagnoses or Management Options  Encounter for medication refill  Insomnia  Diagnosis management comments: Old charts note that in the past the patient is noncompliant with her psychiatry appointments  We did arrange for patient to have follow-up with psychiatrist; did an intake on 08/11; had psychiatry appointment 9-23  Pt states that apt was in [facility], and gas is too expensive, so she can not possibly make it out there   SO, she did another intake on Friday, 3 days ago, with a local mental health provider in [place]; and is going to call today to find out about psychiatry appointment; but until then, was referred to ED  This is patient's 3rd visit over the past few months for medication refills; stressed to patient the need for good follow-up and that this is the last refill that we would be able to give from the emergency department  I will give her Seroquel but patient has not been taking Xanax, so I will not restart from the emergency department  Pt then came out with her phone stating she has FU with psychiatrist in 2 5 wks  Amount and/or Complexity of Data Reviewed  Obtain history from someone other than the patient: yes  Review and summarize past medical records: yes        Disposition  Final diagnoses:   Encounter for medication refill   Insomnia     Time reflects when diagnosis was documented in both MDM as applicable and the Disposition within this note     Time User Action Codes Description Comment    8/22/2022 10:53 AM Inell Poet Add [Z76 0] Encounter for medication refill     8/22/2022 10:53 AM Inell Poet Add [G47 00] Insomnia       ED Disposition     ED Disposition   Discharge    Condition   Stable    Date/Time   Mon Aug 22, 2022 10:52 AM    Comment   Tara Fishman discharge to home/self care                 Follow-up Information     Follow up With Specialties Details Why Contact Info    Mehul Leticia, [address] [address],[address]  [facility]  [phone]      Your mental health provider              Current Discharge Medication List      CONTINUE these medications which have CHANGED    Details   QUEtiapine (SEROquel) 400 MG tablet Take 1 tablet (400 mg total) by mouth daily at bedtime for 14 days  Qty: 14 tablet, Refills: 0    Associated Diagnoses: Encounter for medication refill         CONTINUE these medications which have NOT CHANGED    Details   ALPRAZolam (XANAX) 0 5 mg tablet Take 0 5 mg by mouth daily as needed      Aspirin Low Dose 81 MG chewable tablet Chew 81 mg daily Chew      Cholecalciferol (Vitamin D3) 50 MCG (2000 UT) TABS Take 2,000 Units by mouth daily      Lantus SoloStar 100 units/mL injection pen INJECT 28 UNIT BY SUBCUTANEOUS ROUTE EVERY DAY      lisinopril (ZESTRIL) 2 5 mg tablet Take 2 5 mg by mouth daily      metFORMIN (GLUCOPHAGE) 500 mg tablet Take 500 mg by mouth 2 (two) times a day with meals      rosuvastatin (CRESTOR) 10 MG tablet Take 10 mg by mouth daily             No discharge procedures on file      PDMP Review     None          ED Provider  Electronically Signed by           Camille Mcdoewll PA-C  08/22/22 6509

## 2022-09-20 DIAGNOSIS — B37.31 CANDIDA VAGINITIS: Primary | ICD-10-CM

## 2022-09-20 DIAGNOSIS — B37.9 YEAST INFECTION: Primary | ICD-10-CM

## 2022-09-20 RX ORDER — FLUCONAZOLE 150 MG/1
150 TABLET ORAL
Qty: 2 TABLET | Refills: 0 | Status: SHIPPED | OUTPATIENT
Start: 2022-09-20 | End: 2022-09-24

## 2022-09-23 ENCOUNTER — OFFICE VISIT (OUTPATIENT)
Dept: PSYCHIATRY | Facility: CLINIC | Age: 52
End: 2022-09-23

## 2022-09-23 DIAGNOSIS — Z53.29 NO-SHOW FOR APPOINTMENT: Primary | ICD-10-CM

## 2022-09-23 NOTE — PSYCH
No Call  No Show  No Charge    Jaelyn Laguerre no showed 09/23/22 appointment , resident physician waited approxiamtely 30 minutes for patient to arrive, staff called and left message to reschedule appointment     Treatment Plan not completed within required time limits due to:  Jaelyn Laguerre no show appointment on 09/23/22

## 2022-09-30 ENCOUNTER — ANNUAL EXAM (OUTPATIENT)
Dept: OBGYN CLINIC | Facility: CLINIC | Age: 52
End: 2022-09-30
Payer: COMMERCIAL

## 2022-09-30 VITALS
WEIGHT: 177.6 LBS | DIASTOLIC BLOOD PRESSURE: 76 MMHG | BODY MASS INDEX: 28.54 KG/M2 | HEIGHT: 66 IN | SYSTOLIC BLOOD PRESSURE: 102 MMHG

## 2022-09-30 DIAGNOSIS — Z11.4 SCREENING FOR HIV (HUMAN IMMUNODEFICIENCY VIRUS): ICD-10-CM

## 2022-09-30 DIAGNOSIS — N89.8 VAGINAL DRYNESS: ICD-10-CM

## 2022-09-30 DIAGNOSIS — Z12.31 ENCOUNTER FOR SCREENING MAMMOGRAM FOR MALIGNANT NEOPLASM OF BREAST: ICD-10-CM

## 2022-09-30 DIAGNOSIS — Z01.419 ENCOUNTER FOR GYNECOLOGICAL EXAMINATION WITHOUT ABNORMAL FINDING: Primary | ICD-10-CM

## 2022-09-30 DIAGNOSIS — Z11.59 NEED FOR HEPATITIS C SCREENING TEST: ICD-10-CM

## 2022-09-30 DIAGNOSIS — Z11.59 NEED FOR HEPATITIS B SCREENING TEST: ICD-10-CM

## 2022-09-30 DIAGNOSIS — Z12.4 SCREENING FOR CERVICAL CANCER: ICD-10-CM

## 2022-09-30 DIAGNOSIS — Z11.3 SCREEN FOR STD (SEXUALLY TRANSMITTED DISEASE): ICD-10-CM

## 2022-09-30 PROCEDURE — 87491 CHLMYD TRACH DNA AMP PROBE: CPT | Performed by: PHYSICIAN ASSISTANT

## 2022-09-30 PROCEDURE — G0476 HPV COMBO ASSAY CA SCREEN: HCPCS | Performed by: PHYSICIAN ASSISTANT

## 2022-09-30 PROCEDURE — 87591 N.GONORRHOEAE DNA AMP PROB: CPT | Performed by: PHYSICIAN ASSISTANT

## 2022-09-30 PROCEDURE — G0145 SCR C/V CYTO,THINLAYER,RESCR: HCPCS | Performed by: PHYSICIAN ASSISTANT

## 2022-09-30 PROCEDURE — 99396 PREV VISIT EST AGE 40-64: CPT | Performed by: PHYSICIAN ASSISTANT

## 2022-09-30 NOTE — PROGRESS NOTES
ASSESSMENT & PLAN: Rupal Lara is a 47 y o   with normal gynecologic exam     1   Routine well woman exam done today  2  Pap and HPV:  The patient's last pap and hpv was 2021  It was normal     Pap and cotesting was done today  Current ASCCP Guidelines reviewed  Pt desires screening  STD screening also done - GC/CT, also BW order for RPR, HIV, hep screening given at pt request    3   Mammogram ordered - encouraged to schedule as overdue  4  Colonoscopy UTD from 2022 - repeat in 5 yrs  5  The following were reviewed in today's visit: breast self exam, mammography screening ordered, STD testing, menopause, adequate intake of calcium and vitamin D, exercise and healthy diet  6  Pt reporting excessive vagianl dryness and something "not feeling right"  Dryness on exam though no other significant abnormalities  Discussed recommendation for PM dryness ot include genital/vagianl moisturizer like vit E oil, coconut oil, lubricant, as well as Estrace  Pt desire to try Estrace w/ risks vs benefits and appropriate use discussed  She will trial 2 x weekly, will moisturizer oil on other nights, and will f/u in office in about 3 months for recheck  Also make next annual appt for   CC:  Annual Gynecologic Examination    HPI: Rupal Lara is a 47 y o  Josef Millie who presents for annual gynecologic examination  She has the following concerns:  She desires STD screening  States was with a gentleman whom she found out used heroin, possible contact w/ blood  Last encounter was 2 weeks ago  Also concern about excessive vaginal dryness  Denies abnormal itch/odor/discharge  States vagina feels different  She cannot describe it, just feels different  No LMP recorded  Patient is postmenopausal     Post-menopausal bleeding: none    Bowel or bladder changes: denies      Health Maintenance:      She does perform  breast exams  She denies breast pain, lumps, nipple discharge or skin changes       She feels safe at home  Last Pap: 2021 - neg  Last mammogram: unknown  Last colonoscopy: 2022; repeat 5 years  Past Medical History:   Diagnosis Date    Asthma     Depression     Diabetes mellitus ([facility] [address] )     type 2    Hypertension        Past Surgical History:   Procedure Laterality Date    KNEE ARTHROCENTESIS      MOUTH SURGERY         Past OB/Gyn History:  OB History        0    Para   0    Term   0       0    AB   0    Living   0       SAB   0    IAB   0    Ectopic   0    Multiple   0    Live Births   0               Pt does not have menstrual issues  History of abnormal pap smears: denies  Patient is currently sexually active  The current method of family planning is post menopausal status      Family History   Problem Relation Age of Onset    Diabetes Mother     Diabetes Father     Colon cancer Brother        Family history of Breast/Uterine/Ovarian/Colon Cancer: colon CA brother    Social History:  Social History     Socioeconomic History    Marital status: Single     Spouse name: Not on file    Number of children: Not on file    Years of education: Not on file    Highest education level: Not on file   Occupational History    Not on file   Tobacco Use    Smoking status: Current Every Day Smoker     Packs/day: 1 00     Types: Cigarettes    Smokeless tobacco: Never Used   Vaping Use    Vaping Use: Never used   Substance and Sexual Activity    Alcohol use: Not Currently    Drug use: Never    Sexual activity: Yes     Partners: Male     Birth control/protection: None   Other Topics Concern    Not on file   Social History Narrative    Not on file     Social Determinants of Health     Financial Resource Strain: Not on file   Food Insecurity: Not on file   Transportation Needs: Not on file   Physical Activity: Not on file   Stress: Not on file   Social Connections: Not on file   Intimate Partner Violence: Not on file   Housing Stability: Not on file       No Known Allergies    Current Outpatient Medications:     ALPRAZolam (XANAX) 0 5 mg tablet, Take 0 5 mg by mouth daily as needed, Disp: , Rfl:     Aspirin Low Dose 81 MG chewable tablet, Chew 81 mg daily Chew, Disp: , Rfl:     Cholecalciferol (Vitamin D3) 50 MCG (2000 UT) TABS, Take 2,000 Units by mouth daily, Disp: , Rfl:     Lantus SoloStar 100 units/mL injection pen, INJECT 28 UNIT BY SUBCUTANEOUS ROUTE EVERY DAY, Disp: , Rfl:     lisinopril (ZESTRIL) 2 5 mg tablet, Take 2 5 mg by mouth daily, Disp: , Rfl:     metFORMIN (GLUCOPHAGE) 500 mg tablet, Take 500 mg by mouth 2 (two) times a day with meals, Disp: , Rfl:     rosuvastatin (CRESTOR) 10 MG tablet, Take 10 mg by mouth daily, Disp: , Rfl:     QUEtiapine (SEROquel) 400 MG tablet, Take 1 tablet (400 mg total) by mouth daily at bedtime for 14 days, Disp: 14 tablet, Rfl: 0      Review of Systems  Constitutional :no fever, feels well, no tiredness, no recent weight gain or loss  ENT: no ear ache, no loss of hearing, no nosebleeds or nasal discharge, no sore throat or hoarseness  Cardiovascular: no complaints of slow or fast heart beat, no chest pain, no palpitations, no leg claudication or lower extremity edema  Respiratory: no complaints of shortness of shortness of breath, no YOUSSEF  Breasts:no complaints of breast pain, breast lump, or nipple discharge  Gastrointestinal: no complaints of abdominal pain, constipation, nausea, vomiting, or diarrhea or bloody stools  Genitourinary : GYN sxs as in HPI; no complaints of dysuria, incontinence, pelvic pain, vaginal discharge/itch/odor or abnormal vaginal bleeding  Musculoskeletal: no complaints of arthralgia, no myalgia, no joint swelling or stiffness, no limb pain or swelling  Integumentary: no complaints of skin rash or lesion, itching or dry skin  Neurological: no complaints of headache, no confusion, no numbness or tingling, no dizziness or fainting  MH: denies worsening depression or anxiety       Objective      BP 102/76 (BP Location: Left arm, Patient Position: Sitting, Cuff Size: Adult)   Ht 5' 6" (1 676 m)   Wt 80 6 kg (177 lb 9 6 oz)   BMI 28 67 kg/m²     General:   appears stated age, cooperative, alert normal mood and affect  BMI 28 67   Neck: normal, supple,trachea midline, no masses  Thyroid palpated normal     Heart: regular rate and rhythm, S1, S2 normal, no murmur, click, rub or gallop   Lungs: clear to auscultation bilaterally   Breasts: normal appearance, no masses or tenderness, No nipple retraction or dimpling, No nipple discharge or bleeding, No axillary or supraclavicular adenopathy, Normal to palpation without dominant masses   Abdomen: soft, non-tender, without masses or organomegaly   Vulva: normal female genitalia, no lesions   Vagina: normal vagina, no discharge, exudate, lesion, or erythema   Urethra: normal   Cervix: Normal, no discharge  PAP done  Nontender  Uterus: normal and non-tender   Adnexa: no mass, fullness, tenderness   Lymphatic palpation of lymph nodes in neck, axilla, groin and/or other locations: no lymphadenopathy or masses noted   Skin normal skin turgor and no rashes     Psychiatric orientation to person, place, and time: normal  mood and affect: normal

## 2022-10-01 LAB
C TRACH DNA SPEC QL NAA+PROBE: NEGATIVE
N GONORRHOEA DNA SPEC QL NAA+PROBE: NEGATIVE

## 2022-10-11 LAB
LAB AP GYN PRIMARY INTERPRETATION: NORMAL
Lab: NORMAL

## 2022-10-20 ENCOUNTER — APPOINTMENT (OUTPATIENT)
Dept: LAB | Facility: HOSPITAL | Age: 52
End: 2022-10-20
Payer: COMMERCIAL

## 2022-10-20 DIAGNOSIS — Z11.59 NEED FOR HEPATITIS C SCREENING TEST: ICD-10-CM

## 2022-10-20 DIAGNOSIS — Z11.59 NEED FOR HEPATITIS B SCREENING TEST: ICD-10-CM

## 2022-10-20 DIAGNOSIS — Z11.3 SCREEN FOR STD (SEXUALLY TRANSMITTED DISEASE): ICD-10-CM

## 2022-10-20 DIAGNOSIS — Z11.4 SCREENING FOR HIV (HUMAN IMMUNODEFICIENCY VIRUS): ICD-10-CM

## 2022-10-20 PROCEDURE — 80074 ACUTE HEPATITIS PANEL: CPT

## 2022-10-20 PROCEDURE — 87389 HIV-1 AG W/HIV-1&-2 AB AG IA: CPT

## 2022-10-20 PROCEDURE — 36415 COLL VENOUS BLD VENIPUNCTURE: CPT

## 2022-10-20 PROCEDURE — 86592 SYPHILIS TEST NON-TREP QUAL: CPT

## 2022-10-21 LAB
HAV IGM SER QL: NORMAL
HBV CORE IGM SER QL: NORMAL
HBV SURFACE AG SER QL: NORMAL
HCV AB SER QL: NORMAL
HIV 1+2 AB+HIV1 P24 AG SERPL QL IA: NORMAL
RPR SER QL: NORMAL

## 2022-12-12 ENCOUNTER — HOSPITAL ENCOUNTER (EMERGENCY)
Facility: HOSPITAL | Age: 52
Discharge: HOME | End: 2022-12-12
Attending: INTERNAL MEDICINE

## 2022-12-12 ENCOUNTER — APPOINTMENT (EMERGENCY)
Dept: RADIOLOGY | Facility: HOSPITAL | Age: 52
End: 2022-12-12

## 2022-12-12 VITALS
HEART RATE: 109 BPM | TEMPERATURE: 98.2 F | HEIGHT: 66 IN | SYSTOLIC BLOOD PRESSURE: 134 MMHG | BODY MASS INDEX: 26.52 KG/M2 | DIASTOLIC BLOOD PRESSURE: 67 MMHG | OXYGEN SATURATION: 100 % | RESPIRATION RATE: 20 BRPM | WEIGHT: 165 LBS

## 2022-12-12 DIAGNOSIS — M54.50 ACUTE LOW BACK PAIN: Primary | ICD-10-CM

## 2022-12-12 DIAGNOSIS — M51.36 DDD (DEGENERATIVE DISC DISEASE), LUMBAR: ICD-10-CM

## 2022-12-12 RX ORDER — KETOROLAC TROMETHAMINE 30 MG/ML
30 INJECTION, SOLUTION INTRAMUSCULAR; INTRAVENOUS ONCE
Status: COMPLETED | OUTPATIENT
Start: 2022-12-12 | End: 2022-12-12

## 2022-12-12 RX ADMIN — KETOROLAC TROMETHAMINE 30 MG: 30 INJECTION, SOLUTION INTRAMUSCULAR; INTRAVENOUS at 13:53

## 2022-12-12 NOTE — ED PROVIDER NOTES
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 Taking?    ALPRAZolam (XANAX) 0 5 mg tablet   Yes No   Sig: Take 0 5 mg by mouth daily as needed   Aspirin Low Dose 81 MG chewable tablet   Yes No   Sig: Chew 81 mg daily Chew   Cholecalciferol (Vitamin D3) 50 MCG (2000 UT) TABS   Yes No   Sig: Take 2,000 Units by mouth daily   Lantus SoloStar 100 units/mL injection pen   Yes No   Sig: INJECT 28 UNIT BY SUBCUTANEOUS ROUTE EVERY DAY   QUEtiapine (SEROquel) 400 MG tablet   No No   Sig: Take 1 tablet (400 mg total) by mouth daily at bedtime for 14 days   lisinopril (ZESTRIL) 2 5 mg tablet   Yes No   Sig: Take 2 5 mg by mouth daily   metFORMIN (GLUCOPHAGE) 500 mg tablet   Yes No   Sig: Take 500 mg by mouth 2 (two) times a day with meals   rosuvastatin (CRESTOR) 10 MG tablet   Yes No   Sig: Take 10 mg by mouth daily      Facility-Administered Medications: None       Past Medical History:   Diagnosis Date   • Asthma    • Depression    • Diabetes mellitus ([facility] [address] )     type 2   • Hypertension        Past Surgical History:   Procedure Laterality Date   • KNEE ARTHROCENTESIS     • MOUTH SURGERY Family History   Problem Relation Age of Onset   • Diabetes Mother    • Diabetes Father    • Colon cancer Brother      I have reviewed and agree with the history as documented  E-Cigarette/Vaping   • E-Cigarette Use Never User      E-Cigarette/Vaping Substances   • THC No    • CBD No      Social History     Tobacco Use   • Smoking status: Every Day     Packs/day: 1 00     Types: Cigarettes   • Smokeless tobacco: Never   Vaping Use   • Vaping Use: Never used   Substance Use Topics   • Alcohol use: Not Currently   • Drug use: Never       Review of Systems   Constitutional: Negative for fever  HENT: Negative for hearing loss and sore throat  Respiratory: Negative for cough and shortness of breath  Cardiovascular: Negative for chest pain  Gastrointestinal: Negative for abdominal pain, diarrhea and vomiting  Genitourinary: Negative for dysuria and frequency  Musculoskeletal: Positive for back pain and myalgias  Negative for arthralgias  Skin: Negative for rash  Neurological: Negative for dizziness, weakness and numbness  Psychiatric/Behavioral: Negative for behavioral problems  All other systems reviewed and are negative  Physical Exam  Physical Exam  Vitals and nursing note reviewed  Constitutional:       General: She is in acute distress (mild)  Appearance: She is well-developed  HENT:      Head: Normocephalic and atraumatic  Right Ear: External ear normal       Left Ear: External ear normal       Nose: Nose normal       Mouth/Throat:      Mouth: Mucous membranes are moist       Pharynx: Oropharynx is clear  Eyes:      Conjunctiva/sclera: Conjunctivae normal    Cardiovascular:      Rate and Rhythm: Normal rate and regular rhythm  Pulmonary:      Effort: Pulmonary effort is normal    Musculoskeletal:         General: Tenderness (mild diffuse paralumbar musculature tenderness) present  Normal range of motion  Cervical back: Normal range of motion     Skin: General: Skin is warm and dry  Capillary Refill: Capillary refill takes less than 2 seconds  Neurological:      Mental Status: She is alert  Motor: No weakness  Gait: Gait normal    Psychiatric:         Behavior: Behavior normal          Vital Signs  ED Triage Vitals   Temperature Pulse Respirations Blood Pressure SpO2   12/12/22 1306 12/12/22 1304 12/12/22 1304 12/12/22 1306 12/12/22 1304   98 2 °F (36 8 °C) (!) 109 20 134/67 100 %      Temp Source Heart Rate Source Patient Position - Orthostatic VS BP Location FiO2 (%)   12/12/22 1306 12/12/22 1304 12/12/22 1304 12/12/22 1304 --   Oral Monitor Sitting Right arm       Pain Score       12/12/22 1353       10 - Worst Possible Pain           Vitals:    12/12/22 1304 12/12/22 1306   BP:  134/67   Pulse: (!) 109    Patient Position - Orthostatic VS: Sitting          Visual Acuity      ED Medications  Medications   ketorolac (TORADOL) injection 30 mg (30 mg Intramuscular Given 12/12/22 1353)       Diagnostic Studies  Results Reviewed     None                 XR lumbar spine 2 or 3 views   Final Result by Sergo Hernandez MD (12/12 1417)      1  No acute osseous adenopathy  2   Mild degenerative disc disease of the lumbar spine  Workstation performed: [id number]                    Procedures  Procedures         ED Course                               SBIRT 22yo+    Flowsheet Row Most Recent Value   SBIRT (25 yo +)    In order to provide better care to our patients, we are screening all of our patients for alcohol and drug use  Would it be okay to ask you these screening questions? 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"    PT requesting xray       Amount and/or Complexity of Data Reviewed  Tests in the radiology section of CPT®: ordered and reviewed        Disposition  Final diagnoses:   Acute low back pain   DDD (degenerative disc disease), lumbar     Time reflects when diagnosis was documented in both MDM as applicable and the Disposition within this note     Time User Action Codes Description Comment    12/12/2022  1:33 PM Ruthann Sands Add [M54 50] Acute low back pain     12/12/2022  2:22 PM Ruthann Sands Add [M51 36] DDD (degenerative disc disease), lumbar       ED Disposition     ED Disposition   Discharge    Condition   Stable    Date/Time   Mon Dec 12, 2022  2:22 PM    [address] discharge to home/self care                 Follow-up Information     Follow up With Specialties Details Why Contact Info Additional [address], [address] [address]  [address] [facility]   [phone]          Discharge Medication List as of 12/12/2022  2:23 PM      CONTINUE these medications which have NOT CHANGED    Details   ALPRAZolam (XANAX) 0 5 mg tablet Take 0 5 mg by mouth daily as needed, Starting Sat 3/13/2021, Historical Med      Aspirin Low Dose 81 MG chewable tablet Chew 81 mg daily Chew, Starting Thu 4/1/2021, Historical Med      Cholecalciferol (Vitamin D3) 50 MCG (2000 UT) TABS Take 2,000 Units by mouth daily, Starting Wed 2/24/2021, Historical Med      Lantus SoloStar 100 units/mL injection pen INJECT 28 UNIT BY SUBCUTANEOUS ROUTE EVERY DAY, Historical Med      lisinopril (ZESTRIL) 2 5 mg tablet Take 2 5 mg by mouth daily, Starting Wed 3/24/2021, Historical Med      metFORMIN (GLUCOPHAGE) 500 mg tablet Take 500 mg by mouth 2 (two) times a day with meals, Historical Med      QUEtiapine (SEROquel) 400 MG tablet Take 1 tablet (400 mg total) by mouth daily at bedtime for 14 days, Starting Mon 8/22/2022, Until Mon 9/5/2022, Normal      rosuvastatin (CRESTOR) 10 MG tablet Take 10 mg by mouth daily, Starting Thu 4/1/2021, Historical Med             No discharge procedures on file      PDMP Review       Value Time User    PDMP Reviewed  Yes 9/23/2022  9:32 AM Brennan Tolbert DO          ED Provider  Electronically Signed by           Sean Blanton PA-C  12/12/22 5468

## 2022-12-12 NOTE — DISCHARGE INSTRUCTIONS
Use Tylenol 650 mg every 4 hours or Anti-inflammatories like Advil, Motrin, Ibuprofen, Aleve every 6 hours; you can alternate the 2 medications taking something every 3 hours for pain, add your Flexeril as needed  Follow-up with your doctor or comprehensive spine management program in the next few days if no improvement in condition

## 2023-03-16 ENCOUNTER — OFFICE VISIT (OUTPATIENT)
Dept: OBGYN CLINIC | Facility: CLINIC | Age: 53
End: 2023-03-16

## 2023-03-16 VITALS
WEIGHT: 173.2 LBS | BODY MASS INDEX: 27.83 KG/M2 | HEIGHT: 66 IN | SYSTOLIC BLOOD PRESSURE: 128 MMHG | DIASTOLIC BLOOD PRESSURE: 72 MMHG

## 2023-03-16 DIAGNOSIS — Z11.59 NEED FOR HEPATITIS B SCREENING TEST: ICD-10-CM

## 2023-03-16 DIAGNOSIS — Z11.4 SCREENING FOR HIV (HUMAN IMMUNODEFICIENCY VIRUS): ICD-10-CM

## 2023-03-16 DIAGNOSIS — Z11.59 NEED FOR HEPATITIS C SCREENING TEST: ICD-10-CM

## 2023-03-16 DIAGNOSIS — R39.9 URINARY SYMPTOM OR SIGN: Primary | ICD-10-CM

## 2023-03-16 DIAGNOSIS — Z11.3 SCREEN FOR STD (SEXUALLY TRANSMITTED DISEASE): ICD-10-CM

## 2023-03-16 DIAGNOSIS — N89.8 VAGINAL ODOR: ICD-10-CM

## 2023-03-16 RX ORDER — PAROXETINE 30 MG/1
30 TABLET, FILM COATED ORAL DAILY
COMMUNITY
Start: 2023-02-04

## 2023-03-16 RX ORDER — NITROFURANTOIN 25; 75 MG/1; MG/1
100 CAPSULE ORAL 2 TIMES DAILY
Qty: 10 CAPSULE | Refills: 0 | Status: SHIPPED | OUTPATIENT
Start: 2023-03-16

## 2023-03-16 RX ORDER — PEN NEEDLE, DIABETIC 32GX 5/32"
NEEDLE, DISPOSABLE MISCELLANEOUS
COMMUNITY
Start: 2023-01-22

## 2023-03-16 RX ORDER — QUETIAPINE FUMARATE 300 MG/1
400 TABLET, FILM COATED ORAL
COMMUNITY
Start: 2023-03-08

## 2023-03-16 NOTE — PROGRESS NOTES
Assessment/Plan:       Diagnoses and all orders for this visit:    Urinary symptom or sign  -     nitrofurantoin (MACROBID) 100 mg capsule; Take 1 capsule (100 mg total) by mouth 2 (two) times a day  -     Urine culture    Vaginal odor  -     VAGINOSIS DNA PROBE (AFFIRM)    Screen for STD (sexually transmitted disease)  -     HIV 1/2 AG/AB w Reflex [facility] for 2 yr old and above; Future  -     RPR-Syphilis Screening (Total Syphilis IGG/IGM); Future  -     Hepatitis B surface antigen; Future  -     Hepatitis C antibody; Future  -     Chlamydia/GC amplified DNA by PCR  -     VAGINOSIS DNA PROBE (AFFIRM)    Need for hepatitis B screening test  -     Hepatitis B surface antigen; Future    Need for hepatitis C screening test  -     Hepatitis C antibody; Future    Screening for HIV (human immunodeficiency virus)  -     HIV 1/2 AG/AB w Reflex [facility] for 2 yr old and above; Future    Other orders  -     metFORMIN (GLUCOPHAGE) 1000 MG tablet; Take 1,000 mg by mouth every morning  -     BD Pen Needle Maile 2nd Gen 32G X 4 MM MISC; Use as directed  -     PARoxetine (PAXIL) 30 mg tablet; Take 30 mg by mouth daily  -     QUEtiapine (SEROquel) 300 mg tablet; Take 400 mg by mouth daily at bedtime  -     Multiple Vitamins-Minerals (ONE-A-DAY FOR HER VITACRAVES PO); Take by mouth      52-year-old female presenting today for urinary concerns, vaginal odor and desires STD screening and blood work  Only enough urine in clean-catch to send for culture  Vulvovaginal and pelvic exam unremarkable today  Preexistent diabetes managed by PCP, current A1c in status of diabetes unknown  Patient also concerned about some weight loss but there is 8 pound weight gain since last documented weight in epic since December  Patient also concerned about a small superficial lump right lateral hip area    PLAN:  Affirm  GC/CT  Urine culture  Discussed treatment for possible UTI with Macrobid, patient desired to start antibiotic    Sent to pharmacy  Increase water intake  Prescription blood work order provided for HIV, hepatitis B/C and RPR screening at patient request  Patient encouraged to follow-up with her PCP as soon as possible to discuss right hip lump as well as her chronic condition including her diabetes  Briefly discussed potential risk and complications associated with uncontrolled diabetes  Patient due for annual in September and will schedule this at checkout  She will call or follow-up sooner if any problems in the interim  We will reach out to patient with test results  Chief Complaint   Patient presents with   • std screen     Patient states she lost a lot of weight,  some odor, no itching, no discharge,  partner together for 11 years wants to be checked for std's , was seen at ED a few months ago no UTI, urinates a lot after sexual intercourse has back pain,  right hip lump       Subjective:      Patient ID: Delilah Tracy is a 47 y o  female     52y/o F here today for vaginal odor, urinary sxs and desire STD screening and BW  States after having sex with her partner a few weeks ago, noticed some urinary frequency, low back pain, urgency  Denies pain with urination or gross hematuria  2 episodes of pelvic pain at work, but not recurrent  Also noticed a vaginal odor  Denies vaginal itching or abnormal discharge  The following portions of the patient's history were reviewed and updated as appropriate: allergies, current medications, past medical history, past social history and problem list     Review of Systems   Constitutional: Negative  Gastrointestinal: Negative  Genitourinary:        As in HPI         Objective:      /72 (BP Location: Left arm, Patient Position: Sitting, Cuff Size: Standard)   Ht 5' 6" (1 676 m)   Wt 78 6 kg (173 lb 3 2 oz)   BMI 27 96 kg/m²          Physical Exam  Vitals reviewed  Constitutional:       Appearance: Normal appearance  Abdominal:      General: There is no distension  Tenderness: There is no abdominal tenderness  Genitourinary:     General: Normal vulva  Labia:         Right: No rash, tenderness or lesion  Left: No rash, tenderness or lesion  Vagina: Normal  No vaginal discharge, erythema, tenderness, bleeding or lesions  Cervix: No cervical motion tenderness, discharge, friability, lesion, erythema or cervical bleeding  Lymphadenopathy:      Lower Body: No right inguinal adenopathy  No left inguinal adenopathy  Neurological:      Mental Status: She is alert and oriented to person, place, and time  Psychiatric:         Mood and Affect: Mood normal          Behavior: Behavior normal  Behavior is cooperative

## 2023-03-17 LAB
BACTERIA UR CULT: NORMAL
C TRACH DNA SPEC QL NAA+PROBE: NEGATIVE
CANDIDA RRNA VAG QL PROBE: NEGATIVE
G VAGINALIS RRNA GENITAL QL PROBE: NEGATIVE
N GONORRHOEA DNA SPEC QL NAA+PROBE: NEGATIVE
T VAGINALIS RRNA GENITAL QL PROBE: NEGATIVE

## 2023-03-20 ENCOUNTER — APPOINTMENT (OUTPATIENT)
Dept: LAB | Facility: HOSPITAL | Age: 53
End: 2023-03-20
Attending: FAMILY MEDICINE

## 2023-03-20 ENCOUNTER — HOSPITAL ENCOUNTER (OUTPATIENT)
Dept: RADIOLOGY | Facility: HOSPITAL | Age: 53
Discharge: HOME | End: 2023-03-20
Attending: FAMILY MEDICINE

## 2023-03-20 DIAGNOSIS — Z11.4 SCREENING FOR HIV (HUMAN IMMUNODEFICIENCY VIRUS): ICD-10-CM

## 2023-03-20 DIAGNOSIS — Z72.0 TOBACCO ABUSE: ICD-10-CM

## 2023-03-20 DIAGNOSIS — Z11.59 NEED FOR HEPATITIS B SCREENING TEST: ICD-10-CM

## 2023-03-20 DIAGNOSIS — E55.9 AVITAMINOSIS D: ICD-10-CM

## 2023-03-20 DIAGNOSIS — Z11.59 NEED FOR HEPATITIS C SCREENING TEST: ICD-10-CM

## 2023-03-20 DIAGNOSIS — E11.9 DIABETES MELLITUS WITHOUT COMPLICATION (HCC): ICD-10-CM

## 2023-03-20 DIAGNOSIS — E78.5 HYPERLIPIDEMIA, UNSPECIFIED HYPERLIPIDEMIA TYPE: ICD-10-CM

## 2023-03-20 DIAGNOSIS — Z11.3 SCREEN FOR STD (SEXUALLY TRANSMITTED DISEASE): ICD-10-CM

## 2023-03-20 LAB
25(OH)D3 SERPL-MCNC: 25.7 NG/ML (ref 30–100)
ALBUMIN SERPL BCP-MCNC: 4.4 G/DL (ref 3.5–5)
ALP SERPL-CCNC: 111 U/L (ref 34–104)
ALT SERPL W P-5'-P-CCNC: 83 U/L (ref 7–52)
ANION GAP SERPL CALCULATED.3IONS-SCNC: 9 MMOL/L (ref 4–13)
AST SERPL W P-5'-P-CCNC: 38 U/L (ref 13–39)
BASOPHILS # BLD AUTO: 0.04 THOUSANDS/ÂΜL (ref 0–0.1)
BASOPHILS NFR BLD AUTO: 1 % (ref 0–1)
BILIRUB SERPL-MCNC: 0.65 MG/DL (ref 0.2–1)
BUN SERPL-MCNC: 10 MG/DL (ref 5–25)
CALCIUM SERPL-MCNC: 9.8 MG/DL (ref 8.4–10.2)
CHLORIDE SERPL-SCNC: 102 MMOL/L (ref 96–108)
CHOLEST SERPL-MCNC: 222 MG/DL
CO2 SERPL-SCNC: 25 MMOL/L (ref 21–32)
CREAT SERPL-MCNC: 0.61 MG/DL (ref 0.6–1.3)
EOSINOPHIL # BLD AUTO: 0.07 THOUSAND/ÂΜL (ref 0–0.61)
EOSINOPHIL NFR BLD AUTO: 1 % (ref 0–6)
ERYTHROCYTE [DISTWIDTH] IN BLOOD BY AUTOMATED COUNT: 13.5 % (ref 11.6–15.1)
EST. AVERAGE GLUCOSE BLD GHB EST-MCNC: 269 MG/DL
GFR SERPL CREATININE-BSD FRML MDRD: 105 ML/MIN/1.73SQ M
GLUCOSE P FAST SERPL-MCNC: 236 MG/DL (ref 65–99)
HBA1C MFR BLD: 11 %
HBV SURFACE AG SER QL: NORMAL
HCT VFR BLD AUTO: 46.7 % (ref 34.8–46.1)
HCV AB SER QL: NORMAL
HDLC SERPL-MCNC: 45 MG/DL
HGB BLD-MCNC: 15.2 G/DL (ref 11.5–15.4)
HIV 1+2 AB+HIV1 P24 AG SERPL QL IA: NORMAL
HIV 2 AB SERPL QL IA: NORMAL
HIV1 AB SERPL QL IA: NORMAL
HIV1 P24 AG SERPL QL IA: NORMAL
IMM GRANULOCYTES # BLD AUTO: 0.04 THOUSAND/UL (ref 0–0.2)
IMM GRANULOCYTES NFR BLD AUTO: 1 % (ref 0–2)
LDLC SERPL CALC-MCNC: 138 MG/DL (ref 0–100)
LYMPHOCYTES # BLD AUTO: 0.99 THOUSANDS/ÂΜL (ref 0.6–4.47)
LYMPHOCYTES NFR BLD AUTO: 19 % (ref 14–44)
MCH RBC QN AUTO: 25.7 PG (ref 26.8–34.3)
MCHC RBC AUTO-ENTMCNC: 32.5 G/DL (ref 31.4–37.4)
MCV RBC AUTO: 79 FL (ref 82–98)
MONOCYTES # BLD AUTO: 0.25 THOUSAND/ÂΜL (ref 0.17–1.22)
MONOCYTES NFR BLD AUTO: 5 % (ref 4–12)
NEUTROPHILS # BLD AUTO: 3.96 THOUSANDS/ÂΜL (ref 1.85–7.62)
NEUTS SEG NFR BLD AUTO: 73 % (ref 43–75)
NONHDLC SERPL-MCNC: 177 MG/DL
NRBC BLD AUTO-RTO: 0 /100 WBCS
PLATELET # BLD AUTO: 179 THOUSANDS/UL (ref 149–390)
PMV BLD AUTO: 11 FL (ref 8.9–12.7)
POTASSIUM SERPL-SCNC: 4.1 MMOL/L (ref 3.5–5.3)
PROT SERPL-MCNC: 7.4 G/DL (ref 6.4–8.4)
RBC # BLD AUTO: 5.91 MILLION/UL (ref 3.81–5.12)
SODIUM SERPL-SCNC: 136 MMOL/L (ref 135–147)
TREPONEMA PALLIDUM IGG+IGM AB [PRESENCE] IN SERUM OR PLASMA BY IMMUNOASSAY: NORMAL
TRIGL SERPL-MCNC: 194 MG/DL
TSH SERPL DL<=0.05 MIU/L-ACNC: 1.91 UIU/ML (ref 0.45–4.5)
WBC # BLD AUTO: 5.35 THOUSAND/UL (ref 4.31–10.16)

## 2023-04-29 ENCOUNTER — HOSPITAL ENCOUNTER (EMERGENCY)
Facility: HOSPITAL | Age: 53
Discharge: HOME | End: 2023-04-29
Attending: EMERGENCY MEDICINE

## 2023-04-29 VITALS
RESPIRATION RATE: 16 BRPM | TEMPERATURE: 98.6 F | OXYGEN SATURATION: 99 % | SYSTOLIC BLOOD PRESSURE: 124 MMHG | HEART RATE: 102 BPM | DIASTOLIC BLOOD PRESSURE: 80 MMHG

## 2023-04-29 DIAGNOSIS — R10.9 ABDOMINAL PAIN: Primary | ICD-10-CM

## 2023-04-29 LAB
ALBUMIN SERPL BCP-MCNC: 4.6 G/DL (ref 3.5–5)
ALP SERPL-CCNC: 106 U/L (ref 34–104)
ALT SERPL W P-5'-P-CCNC: 106 U/L (ref 7–52)
AMPHETAMINES SERPL QL SCN: NEGATIVE
ANION GAP SERPL CALCULATED.3IONS-SCNC: 11 MMOL/L (ref 4–13)
AST SERPL W P-5'-P-CCNC: 47 U/L (ref 13–39)
BARBITURATES UR QL: NEGATIVE
BASOPHILS # BLD AUTO: 0.04 THOUSANDS/ΜL (ref 0–0.1)
BASOPHILS NFR BLD AUTO: 1 % (ref 0–1)
BENZODIAZ UR QL: NEGATIVE
BILIRUB SERPL-MCNC: 0.63 MG/DL (ref 0.2–1)
BILIRUB UR QL STRIP: NEGATIVE
BUN SERPL-MCNC: 14 MG/DL (ref 5–25)
CALCIUM SERPL-MCNC: 10.2 MG/DL (ref 8.4–10.2)
CHLORIDE SERPL-SCNC: 102 MMOL/L (ref 96–108)
CLARITY UR: CLEAR
CO2 SERPL-SCNC: 23 MMOL/L (ref 21–32)
COCAINE UR QL: NEGATIVE
COLOR UR: YELLOW
CREAT SERPL-MCNC: 0.66 MG/DL (ref 0.6–1.3)
EOSINOPHIL # BLD AUTO: 0.03 THOUSAND/ΜL (ref 0–0.61)
EOSINOPHIL NFR BLD AUTO: 0 % (ref 0–6)
ERYTHROCYTE [DISTWIDTH] IN BLOOD BY AUTOMATED COUNT: 13.7 % (ref 11.6–15.1)
GFR SERPL CREATININE-BSD FRML MDRD: 102 ML/MIN/1.73SQ M
GLUCOSE SERPL-MCNC: 237 MG/DL (ref 65–140)
GLUCOSE UR STRIP-MCNC: ABNORMAL MG/DL
HCT VFR BLD AUTO: 46 % (ref 34.8–46.1)
HGB BLD-MCNC: 14.7 G/DL (ref 11.5–15.4)
HGB UR QL STRIP.AUTO: NEGATIVE
IMM GRANULOCYTES # BLD AUTO: 0.02 THOUSAND/UL (ref 0–0.2)
IMM GRANULOCYTES NFR BLD AUTO: 0 % (ref 0–2)
KETONES UR STRIP-MCNC: NEGATIVE MG/DL
LEUKOCYTE ESTERASE UR QL STRIP: NEGATIVE
LYMPHOCYTES # BLD AUTO: 0.88 THOUSANDS/ΜL (ref 0.6–4.47)
LYMPHOCYTES NFR BLD AUTO: 10 % (ref 14–44)
MCH RBC QN AUTO: 25.6 PG (ref 26.8–34.3)
MCHC RBC AUTO-ENTMCNC: 32 G/DL (ref 31.4–37.4)
MCV RBC AUTO: 80 FL (ref 82–98)
METHADONE UR QL: NEGATIVE
MONOCYTES # BLD AUTO: 0.31 THOUSAND/ΜL (ref 0.17–1.22)
MONOCYTES NFR BLD AUTO: 4 % (ref 4–12)
NEUTROPHILS # BLD AUTO: 7.3 THOUSANDS/ΜL (ref 1.85–7.62)
NEUTS SEG NFR BLD AUTO: 85 % (ref 43–75)
NITRITE UR QL STRIP: NEGATIVE
NRBC BLD AUTO-RTO: 0 /100 WBCS
OPIATES UR QL SCN: NEGATIVE
OXYCODONE+OXYMORPHONE UR QL SCN: NEGATIVE
PCP UR QL: NEGATIVE
PH UR STRIP.AUTO: 6 [PH]
PLATELET # BLD AUTO: 217 THOUSANDS/UL (ref 149–390)
PMV BLD AUTO: 10.5 FL (ref 8.9–12.7)
POTASSIUM SERPL-SCNC: 4 MMOL/L (ref 3.5–5.3)
PROT SERPL-MCNC: 7.4 G/DL (ref 6.4–8.4)
PROT UR STRIP-MCNC: NEGATIVE MG/DL
RBC # BLD AUTO: 5.74 MILLION/UL (ref 3.81–5.12)
SODIUM SERPL-SCNC: 136 MMOL/L (ref 135–147)
SP GR UR STRIP.AUTO: <=1.005 (ref 1–1.03)
THC UR QL: NEGATIVE
UROBILINOGEN UR QL STRIP.AUTO: 0.2 E.U./DL
WBC # BLD AUTO: 8.58 THOUSAND/UL (ref 4.31–10.16)

## 2023-04-29 NOTE — ED PROVIDER NOTES
"History  Chief Complaint   Patient presents with    Medical Problem     \"I think my boyfriend is poisoning me, he is a drug dealer and I think he is switching my insulin out for drugs  Him and his baby momma are trying to steal money from me  \" Pt states abdominal pain in puncture areas from insulin  '     To er with concern her bf is poisening her  She states she feels he is stealing her insulin and is replacing it with something else  She states everything hurts and this is what she thinks that  When asked what specifically hurts she states \" I feel it crawl over my skin  Then my kidney, my liver, my guts hurt\" and this seems to be after taking insulin  She denies fever, chills, uti sx  She states this is been going on several days  No n/v/d/cp/sopb/hi/si  She has noticed the police  Prior to Admission Medications   Prescriptions Last Dose Informant Patient Reported? Taking?    ALPRAZolam (XANAX) 0 5 mg tablet   Yes No   Sig: Take 0 5 mg by mouth daily as needed   Aspirin Low Dose 81 MG chewable tablet   Yes No   Sig: Chew 81 mg daily Chew   BD Pen Needle Maile 2nd Gen 32G X 4 MM MISC   Yes No   Sig: Use as directed   Cholecalciferol (Vitamin D3) 50 MCG (2000 UT) TABS   Yes No   Sig: Take 2,000 Units by mouth daily   Patient not taking: Reported on 3/16/2023   Lantus SoloStar 100 units/mL injection pen   Yes No   Sig: INJECT 28 UNIT BY SUBCUTANEOUS ROUTE EVERY DAY   Multiple Vitamins-Minerals (ONE-A-DAY FOR HER VITACRAVES PO)   Yes No   Sig: Take by mouth   PARoxetine (PAXIL) 30 mg tablet   Yes No   Sig: Take 30 mg by mouth daily   QUEtiapine (SEROquel) 300 mg tablet   Yes No   Sig: Take 400 mg by mouth daily at bedtime   QUEtiapine (SEROquel) 400 MG tablet   No No   Sig: Take 1 tablet (400 mg total) by mouth daily at bedtime for 14 days   lisinopril (ZESTRIL) 2 5 mg tablet   Yes No   Sig: Take 2 5 mg by mouth daily   metFORMIN (GLUCOPHAGE) 1000 MG tablet   Yes No   Sig: Take 1,000 mg by mouth every morning " metFORMIN (GLUCOPHAGE) 500 mg tablet   Yes No   Sig: Take 500 mg by mouth 2 (two) times a day with meals   Patient not taking: Reported on 3/16/2023   nitrofurantoin (MACROBID) 100 mg capsule   No No   Sig: Take 1 capsule (100 mg total) by mouth 2 (two) times a day   rosuvastatin (CRESTOR) 10 MG tablet   Yes No   Sig: Take 10 mg by mouth daily      Facility-Administered Medications: None       Past Medical History:   Diagnosis Date    Asthma     Depression     Diabetes mellitus ([address] )     type 2    Hyperlipidemia     Hypertension        Past Surgical History:   Procedure Laterality Date    KNEE ARTHROCENTESIS      MOUTH SURGERY         Family History   Problem Relation Age of Onset    Diabetes Mother     Diabetes Father     Ovarian cancer Sister     Colon cancer Brother      I have reviewed and agree with the history as documented  E-Cigarette/Vaping    E-Cigarette Use Never User      E-Cigarette/Vaping Substances    THC No     CBD No      Social History     Tobacco Use    Smoking status: Every Day     Packs/day: 1 00     Years: 15 00     Pack years: 15 00     Types: Cigarettes    Smokeless tobacco: Never   Vaping Use    Vaping Use: Never used   Substance Use Topics    Alcohol use: Not Currently    Drug use: Never       Review of Systems   All other systems reviewed and are negative  Physical Exam  Physical Exam  Constitutional:       General: She is not in acute distress  Appearance: Normal appearance  She is well-developed  She is not ill-appearing, toxic-appearing or diaphoretic  HENT:      Head: Normocephalic and atraumatic  Right Ear: External ear normal       Left Ear: External ear normal       Nose: Nose normal       Mouth/Throat:      Mouth: Mucous membranes are moist    Eyes:      General:         Right eye: No discharge  Left eye: No discharge  Pupils: Pupils are equal, round, and reactive to light  Neck:      Vascular: No JVD     Cardiovascular: Rate and Rhythm: Normal rate and regular rhythm  Pulses: Normal pulses  Heart sounds: Normal heart sounds  No murmur heard  No friction rub  No gallop  Pulmonary:      Effort: Pulmonary effort is normal  No respiratory distress  Breath sounds: Normal breath sounds  No stridor  No wheezing, rhonchi or rales  Chest:      Chest wall: No tenderness  Abdominal:      General: Abdomen is flat  Bowel sounds are normal  There is no distension  Palpations: Abdomen is soft  There is no mass  Tenderness: There is no abdominal tenderness  There is no guarding or rebound  Hernia: No hernia is present  Musculoskeletal:         General: No swelling, tenderness, deformity or signs of injury  Normal range of motion  Cervical back: Normal range of motion and neck supple  Right lower leg: No edema  Left lower leg: No edema  Skin:     General: Skin is warm  Capillary Refill: Capillary refill takes less than 2 seconds  Coloration: Skin is not jaundiced or pale  Findings: No bruising, erythema, lesion or rash  Neurological:      General: No focal deficit present  Mental Status: She is alert and oriented to person, place, and time  Mental status is at baseline  Cranial Nerves: No cranial nerve deficit  Sensory: No sensory deficit  Motor: No weakness or abnormal muscle tone        Coordination: Coordination normal       Gait: Gait normal       Deep Tendon Reflexes: Reflexes normal    Psychiatric:         Mood and Affect: Mood normal          Vital Signs  ED Triage Vitals   Temperature Pulse Respirations Blood Pressure SpO2   04/29/23 1552 04/29/23 1548 04/29/23 1548 04/29/23 1548 04/29/23 1548   98 6 °F (37 °C) (!) 120 16 124/80 99 %      Temp src Heart Rate Source Patient Position - Orthostatic VS BP Location FiO2 (%)   -- 04/29/23 1548 04/29/23 1548 04/29/23 1548 --    Monitor Sitting Left arm       Pain Score       --                  Vitals: 04/29/23 1548 04/29/23 1649   BP: 124/80    Pulse: (!) 120 102   Patient Position - Orthostatic VS: Sitting          Visual Acuity      ED Medications  Medications - No data to display    Diagnostic Studies  Results Reviewed     Procedure Component Value Units Date/Time    Rapid drug screen, urine [[id number]]  (Normal) Collected: 04/29/23 1613    Lab Status: Final result Specimen: Urine, Clean Catch Updated: 04/29/23 1638     Amph/Meth UR Negative     Barbiturate Ur Negative     Benzodiazepine Urine Negative     Cocaine Urine Negative     Methadone Urine Negative     Opiate Urine Negative     PCP Ur Negative     THC Urine Negative     Oxycodone Urine Negative    Narrative:      FOR MEDICAL PURPOSES ONLY  IF CONFIRMATION NEEDED PLEASE CONTACT THE LAB WITHIN 5 DAYS      Drug Screen Cutoff Levels:  AMPHETAMINE/METHAMPHETAMINES  1000 ng/mL  BARBITURATES     200 ng/mL  BENZODIAZEPINES     200 ng/mL  COCAINE      300 ng/mL  METHADONE      300 ng/mL  OPIATES      300 ng/mL  PHENCYCLIDINE     25 ng/mL  THC       50 ng/mL  OXYCODONE      100 ng/mL    Comprehensive metabolic panel [[id number]]  (Abnormal) Collected: 04/29/23 1616    Lab Status: Final result Specimen: Blood from Arm, Right Updated: 04/29/23 1636     Sodium 136 mmol/L      Potassium 4 0 mmol/L      Chloride 102 mmol/L      CO2 23 mmol/L      ANION GAP 11 mmol/L      BUN 14 mg/dL      Creatinine 0 66 mg/dL      Glucose 237 mg/dL      Calcium 10 2 mg/dL      AST 47 U/L       U/L      Alkaline Phosphatase 106 U/L      Total Protein 7 4 g/dL      Albumin 4 6 g/dL      Total Bilirubin 0 63 mg/dL      eGFR 102 ml/min/1 73sq m     Narrative:      [facility] guidelines for Chronic Kidney Disease (CKD):     Stage 1 with normal or high GFR (GFR > 90 mL/min/1 73 square meters)    Stage 2 Mild CKD (GFR = 60-89 mL/min/1 73 square meters)    Stage 3A Moderate CKD (GFR = 45-59 mL/min/1 73 square meters)    Stage 3B Moderate CKD (GFR = 30-44 mL/min/1 73 square meters)    Stage 4 Severe CKD (GFR = 15-29 mL/min/1 73 square meters)    Stage 5 End Stage CKD (GFR <15 mL/min/1 73 square meters)  Note: GFR calculation is accurate only with a steady state creatinine    UA w Reflex to Microscopic w Reflex to Culture [[id number]]  (Abnormal) Collected: 04/29/23 1613    Lab Status: Final result Specimen: Urine, Clean Catch Updated: 04/29/23 1623     Color, UA Yellow     Clarity, UA Clear     Specific Gravity, UA <=1 005     pH, UA 6 0     Leukocytes, UA Negative     Nitrite, UA Negative     Protein, UA Negative mg/dl      Glucose, UA Trace mg/dl      Ketones, UA Negative mg/dl      Urobilinogen, UA 0 2 E U /dl      Bilirubin, UA Negative     Occult Blood, UA Negative    CBC and differential [[id number]]  (Abnormal) Collected: 04/29/23 1616    Lab Status: Final result Specimen: Blood from Arm, Right Updated: 04/29/23 1621     WBC 8 58 Thousand/uL      RBC 5 74 Million/uL      Hemoglobin 14 7 g/dL      Hematocrit 46 0 %      MCV 80 fL      MCH 25 6 pg      MCHC 32 0 g/dL      RDW 13 7 %      MPV 10 5 fL      Platelets 304 Thousands/uL      nRBC 0 /100 WBCs      Neutrophils Relative 85 %      Immat GRANS % 0 %      Lymphocytes Relative 10 %      Monocytes Relative 4 %      Eosinophils Relative 0 %      Basophils Relative 1 %      Neutrophils Absolute 7 30 Thousands/µL      Immature Grans Absolute 0 02 Thousand/uL      Lymphocytes Absolute 0 88 Thousands/µL      Monocytes Absolute 0 31 Thousand/µL      Eosinophils Absolute 0 03 Thousand/µL      Basophils Absolute 0 04 Thousands/µL                  No orders to display              Procedures  Procedures         ED Course                               SBIRT 22yo+    Flowsheet Row Most Recent Value   Initial Alcohol Screen: US AUDIT-C     1  How often do you have a drink containing alcohol? 0 Filed at: 04/29/2023 1550   2  How many drinks containing alcohol do you have on a typical day you are drinking?   0 Filed at: 04/29/2023 1550   3a  Male UNDER 65: How often do you have five or more drinks on one occasion? 0 Filed at: 04/29/2023 1550   3b  FEMALE Any Age, or MALE 65+: How often do you have 4 or more drinks on one occassion? 0 Filed at: 04/29/2023 1550   Audit-C Score 0 Filed at: 04/29/2023 1550   QUE: How many times in the past year have you    Used an illegal drug or used a prescription medication for non-medical reasons? Never Filed at: 04/29/2023 [address] 60 [address]  To er with concern her insulin is being stolen or poisoned as she hurts all over  She feels her bf is poisoning her  She is well kept, does not appear intoxicated  Does show signs of paranoia but does not have hi, si and does appear to be taking care of her self  She has notified the police  Her exam is reassuring  basic labs noted and reassuring, lfts a bit up, up a mth ago as well  Gluc up, known dm  She will fu with pcp  She will return to er with new sx, further concerns  She will address any further concerns of other hurting her with police who are already involved as per pt (I did see them call her phone on caller id in er)  I have discussed red flags for abd pain, assault, psych  I have addressed all red flags, need for fu and when to return to er and she has voiced understanding  Pt requested drug screen as she thinks not only is her insulin poisoned, she also feels she is being drug with fentanyl and is requesting a drug screen  Drug screen reassuring in er  Amount and/or Complexity of Data Reviewed  Labs: ordered            Disposition  Final diagnoses:   Abdominal pain     Time reflects when diagnosis was documented in both MDM as applicable and the Disposition within this note     Time User Action Codes Description Comment    4/29/2023  5:08 PM Mariano Ruelas Add [R10 9] Abdominal pain       ED Disposition     ED Disposition   Discharge    Condition   Stable    Date/Time   Sat Apr 29, 2023  5:06 PM [address] discharge to home/self care  Follow-up Information     Follow up With Specialties Details Why Contact Info    Izabela Stewart MD Family Medicine Schedule an appointment as soon as possible for a visit in 3 days  [address] [facility]   [phone]            Discharge Medication List as of 4/29/2023  5:09 PM      CONTINUE these medications which have NOT CHANGED    Details   ALPRAZolam (XANAX) 0 5 mg tablet Take 0 5 mg by mouth daily as needed, Starting Sat 3/13/2021, Historical Med      Aspirin Low Dose 81 MG chewable tablet Chew 81 mg daily Chew, Starting Thu 4/1/2021, Historical Med      BD Pen Needle Maile 2nd Gen 32G X 4 MM MISC Use as directed, Starting Sun 1/22/2023, Historical Med      Cholecalciferol (Vitamin D3) 50 MCG (2000 UT) TABS Take 2,000 Units by mouth daily, Starting Wed 2/24/2021, Historical Med      Lantus SoloStar 100 units/mL injection pen INJECT 28 UNIT BY SUBCUTANEOUS ROUTE EVERY DAY, Historical Med      lisinopril (ZESTRIL) 2 5 mg tablet Take 2 5 mg by mouth daily, Starting Wed 3/24/2021, Historical Med      !! metFORMIN (GLUCOPHAGE) 1000 MG tablet Take 1,000 mg by mouth every morning, Starting Tue 2/7/2023, Historical Med      !! metFORMIN (GLUCOPHAGE) 500 mg tablet Take 500 mg by mouth 2 (two) times a day with meals, Historical Med      Multiple Vitamins-Minerals (ONE-A-DAY FOR HER VITACRAVES PO) Take by mouth, Historical Med      nitrofurantoin (MACROBID) 100 mg capsule Take 1 capsule (100 mg total) by mouth 2 (two) times a day, Starting Thu 3/16/2023, Normal      PARoxetine (PAXIL) 30 mg tablet Take 30 mg by mouth daily, Starting Sat 2/4/2023, Historical Med      QUEtiapine (SEROquel) 300 mg tablet Take 400 mg by mouth daily at bedtime, Starting Wed 3/8/2023, Historical Med      rosuvastatin (CRESTOR) 10 MG tablet Take 10 mg by mouth daily, Starting Thu 4/1/2021, Historical Med       !! - Potential duplicate medications found  Please discuss with provider  No discharge procedures on file      PDMP Review       Value Time User    PDMP Reviewed  Yes 9/23/2022  9:32 AM Abby Begum DO          ED Provider  Electronically Signed by           Jet Torres MD  04/29/23 0771

## 2023-04-29 NOTE — DISCHARGE INSTRUCTIONS
Return to er with fever, chills, worsening sx, new sx vomiting, diarrhea, chest pain or shortness of breath    See police in regards to anyone you feel is causing you harm  Return to er with thoughts of hurting self or others

## 2023-05-10 ENCOUNTER — OFFICE VISIT (OUTPATIENT)
Dept: OBGYN CLINIC | Facility: CLINIC | Age: 53
End: 2023-05-10

## 2023-05-10 VITALS
BODY MASS INDEX: 27.1 KG/M2 | HEIGHT: 66 IN | DIASTOLIC BLOOD PRESSURE: 84 MMHG | WEIGHT: 168.6 LBS | SYSTOLIC BLOOD PRESSURE: 112 MMHG

## 2023-05-10 DIAGNOSIS — R10.84 GENERALIZED ABDOMINAL PAIN: ICD-10-CM

## 2023-05-10 DIAGNOSIS — Z11.3 SCREEN FOR STD (SEXUALLY TRANSMITTED DISEASE): ICD-10-CM

## 2023-05-10 DIAGNOSIS — N89.8 VAGINAL ODOR: Primary | ICD-10-CM

## 2023-05-10 DIAGNOSIS — R35.0 URINARY FREQUENCY: ICD-10-CM

## 2023-05-10 LAB
SL AMB  POCT GLUCOSE, UA: ABNORMAL
SL AMB LEUKOCYTE ESTERASE,UA: ABNORMAL
SL AMB POCT BILIRUBIN,UA: ABNORMAL
SL AMB POCT BLOOD,UA: ABNORMAL
SL AMB POCT CLARITY,UA: ABNORMAL
SL AMB POCT COLOR,UA: YELLOW
SL AMB POCT KETONES,UA: ABNORMAL
SL AMB POCT NITRITE,UA: ABNORMAL
SL AMB POCT PH,UA: 5
SL AMB POCT SPECIFIC GRAVITY,UA: 1.03
SL AMB POCT URINE PROTEIN: ABNORMAL
SL AMB POCT UROBILINOGEN: ABNORMAL

## 2023-05-10 RX ORDER — METRONIDAZOLE 500 MG/1
500 TABLET ORAL EVERY 12 HOURS SCHEDULED
Qty: 14 TABLET | Refills: 0 | Status: SHIPPED | OUTPATIENT
Start: 2023-05-10 | End: 2023-05-17

## 2023-05-10 RX ORDER — NICOTINE 21 MG/24HR
PATCH, TRANSDERMAL 24 HOURS TRANSDERMAL
COMMUNITY
Start: 2023-05-08

## 2023-05-10 NOTE — PROGRESS NOTES
"Assessment/Plan:       Diagnoses and all orders for this visit:    Vaginal odor  -     metroNIDAZOLE (FLAGYL) 500 mg tablet; Take 1 tablet (500 mg total) by mouth every 12 (twelve) hours for 7 days No alcohol with medication  -     VAGINOSIS DNA PROBE (AFFIRM)    Urinary frequency  -     POCT urine dip  -     Urine culture    Screen for STD (sexually transmitted disease)  -     VAGINOSIS DNA PROBE (AFFIRM)  -     Chlamydia/GC amplified DNA by PCR    Generalized abdominal pain  -     Urine culture    Other orders  -     nicotine (NICODERM CQ) 21 mg/24 hr TD 24 hr patch      60-year-old female presenting today for concern of \"bacteria\" and desires STD screening  1 day of increased urination  Generalized abdominal pain with occasional pelvic pain  Postmenopausal, denies postmenopausal bleeding  History of BV with a few days of vaginal odor and some slight abnormal discharge  Concerned as recently had unprotected sex with a former partner  Vaginal exam, urine dip unrevealing today  Patient verbalizing desire to be treated for bacterial infection today    Plan: We will subjectively treat for BV based on patient reporting vaginal odor and some abnormal discharge  Flagyl sent to pharmacy, advise no alcohol with medication  Affirm  GC/CT  Urine culture  We will call with results  Discussed other causes to generalized abdominal pain as well as briefly discussed my concern for her uncontrolled diabetes and lack of adherence to insulin use and checking her sugars  Discussed possibility for gastroparesis as well as damage to her liver and pancreas which could also cause abdominal pains  Patient states she will go to pharmacy to get a new glucometer to start checking her sugars  I encouraged healthy eating habits, regular exercise and maintaining a healthy weight  Encouraged her to schedule follow-up with her primary doctor to discuss her diabetes further      RTO in sept for next annual, sooner if any problems " "arise  Chief Complaint   Patient presents with   • Urinary Frequency     Abdominal pain       Subjective:      Patient ID: Jia Hawkins is a 47 y o  female     52y/o F here today for concern for \"bacteria\" and STD screen  States her partner left for a few weeks then came back and had unprotected sex with him and doesn't feel the same since  Denies vaginal itching or pain  Notes some abnormal vaginal discharge, also an odor  She reports hx of vaginal bacterial infection with the same  She does note urinary frequency since yesterday, denies pain with urination or gross hematuria  No fever, chills or flank pain  Generalized abd pain, sometimes pelvic;  states upper abd pain sometimes worse after using the insulin    Was seen in ED recently for abd pains, thought something was wrong with the insulin  Neg workup  T2DM, not checking sugars at home - states needs to get a new device  Last A1C 3/2023 was 11 0          The following portions of the patient's history were reviewed and updated as appropriate: allergies, current medications, past family history, past medical history, past social history, past surgical history and problem list     Review of Systems   Constitutional: Negative  Gastrointestinal: Positive for abdominal pain and constipation (on occasion, flatulence  )  Negative for abdominal distention, blood in stool, diarrhea, nausea and vomiting  Genitourinary:        As in HPI         Objective:      /84 (BP Location: Left arm, Patient Position: Sitting, Cuff Size: Large)   Ht 5' 6\" (1 676 m)   Wt 76 5 kg (168 lb 9 6 oz)   Breastfeeding No   BMI 27 21 kg/m²          Physical Exam  Vitals reviewed  Constitutional:       General: She is not in acute distress  Appearance: She is not ill-appearing, toxic-appearing or diaphoretic  Abdominal:      General: There is no distension  Tenderness: There is abdominal tenderness (mild, generalized)   There is no right CVA " tenderness, left CVA tenderness, guarding or rebound  Genitourinary:     General: Normal vulva  Labia:         Right: No rash, tenderness or lesion  Left: No rash, tenderness or lesion  Vagina: No signs of injury  No vaginal discharge, erythema, tenderness, bleeding or lesions  Cervix: No cervical motion tenderness, discharge, friability, lesion, erythema or cervical bleeding  Uterus: Not tender  Adnexa:         Right: No tenderness  Left: No tenderness  Lymphadenopathy:      Lower Body: No right inguinal adenopathy  No left inguinal adenopathy  Neurological:      Mental Status: She is alert and oriented to person, place, and time  Psychiatric:         Mood and Affect: Mood normal          Behavior: Behavior normal  Behavior is cooperative

## 2023-06-23 ENCOUNTER — TELEPHONE (OUTPATIENT)
Dept: OBGYN CLINIC | Facility: CLINIC | Age: 53
End: 2023-06-23

## 2023-06-26 ENCOUNTER — OFFICE VISIT (OUTPATIENT)
Dept: OBGYN CLINIC | Facility: CLINIC | Age: 53
End: 2023-06-26
Payer: COMMERCIAL

## 2023-06-26 VITALS
WEIGHT: 169 LBS | HEIGHT: 66 IN | BODY MASS INDEX: 27.16 KG/M2 | DIASTOLIC BLOOD PRESSURE: 82 MMHG | SYSTOLIC BLOOD PRESSURE: 118 MMHG

## 2023-06-26 DIAGNOSIS — N89.8 VAGINAL DISCHARGE: ICD-10-CM

## 2023-06-26 DIAGNOSIS — Z11.3 SCREENING EXAMINATION FOR STD (SEXUALLY TRANSMITTED DISEASE): ICD-10-CM

## 2023-06-26 DIAGNOSIS — L29.2 VULVOVAGINAL ITCHING: Primary | ICD-10-CM

## 2023-06-26 PROCEDURE — 87660 TRICHOMONAS VAGIN DIR PROBE: CPT | Performed by: PHYSICIAN ASSISTANT

## 2023-06-26 PROCEDURE — 87480 CANDIDA DNA DIR PROBE: CPT | Performed by: PHYSICIAN ASSISTANT

## 2023-06-26 PROCEDURE — 87510 GARDNER VAG DNA DIR PROBE: CPT | Performed by: PHYSICIAN ASSISTANT

## 2023-06-26 RX ORDER — CLOTRIMAZOLE AND BETAMETHASONE DIPROPIONATE 10; .64 MG/G; MG/G
CREAM TOPICAL 2 TIMES DAILY
Qty: 45 G | Refills: 0 | Status: SHIPPED | OUTPATIENT
Start: 2023-06-26

## 2023-06-26 RX ORDER — RAMIPRIL 2.5 MG/1
2.5 CAPSULE ORAL DAILY
COMMUNITY
Start: 2023-05-17

## 2023-06-26 RX ORDER — DULAGLUTIDE 0.75 MG/.5ML
INJECTION, SOLUTION SUBCUTANEOUS
COMMUNITY
Start: 2023-05-17

## 2023-06-26 RX ORDER — ROSUVASTATIN CALCIUM 20 MG/1
TABLET, COATED ORAL
COMMUNITY
Start: 2023-06-18

## 2023-06-26 RX ORDER — ALBUTEROL SULFATE 90 UG/1
2 AEROSOL, METERED RESPIRATORY (INHALATION)
COMMUNITY
Start: 2023-05-17

## 2023-06-26 RX ORDER — GABAPENTIN 300 MG/1
300 CAPSULE ORAL 2 TIMES DAILY PRN
COMMUNITY
Start: 2023-06-01

## 2023-06-26 NOTE — PROGRESS NOTES
Assessment/Plan:       Diagnoses and all orders for this visit:    Vulvovaginal itching  -     clotrimazole-betamethasone (LOTRISONE) 1-0 05 % cream; Apply topically 2 (two) times a day To affected vulvar areas for itching externally only  -     VAGINOSIS DNA PROBE (AFFIRM)    Vaginal discharge  -     VAGINOSIS DNA PROBE (AFFIRM)    Screening examination for STD (sexually transmitted disease)  -     Chlamydia/GC amplified DNA by PCR    Other orders  -     albuterol (PROVENTIL HFA,VENTOLIN HFA) 90 mcg/act inhaler; Inhale 2 puffs every 4 - 6 hours as needed  -     Trulicity 7 25 RE/0 0RF injection; INJECT 0 75 MG SUBCUTANEOUSLY ONE TIME PER WEEK  -     gabapentin (NEURONTIN) 300 mg capsule; Take 300 mg by mouth 2 (two) times a day as needed  -     ramipril (ALTACE) 2 5 mg capsule; Take 2 5 mg by mouth daily  -     rosuvastatin (CRESTOR) 20 MG tablet      51y/o F c/o vulvovaginal sxs as in HPI, as well as admitted to chronic vulvovaginal dryness  Desires STD screen    Normal PAP 2022    PLAN:  AFFRIM  GC/CT  Will treat based on results  Will tx symptomatically with topical Lotrisone to affected areas external vulva x 7-10 days  Discussed with pt consider trial of vulvovaginal Estrace 2 x weekly for the dryness - pt to consider once test results are in  Again reminded patient importance of controlling her sugars/diabetes! RTO 2023 for annual, sooner if needed  Chief Complaint   Patient presents with   • Vaginitis     Vaginal itching, outside the vagina, has discharge yellow in color , has odor        Subjective:      Patient ID: Jessica uLu is a 47 y o  female     52y/o Female here today for vaginal and vulvar sxs  States she started 6 days ago some external itching, tried OTC cream from [email or web address]  Also abnormal discharge - yellow  She has same sexual partner  Desires std screen  She does note issues with chronic dryness and pain with intercourse because of dryness    Does not use "lubrication during sex  Pt is a diabetic, Last A1C was 11 2 in 3/2023      The following portions of the patient's history were reviewed and updated as appropriate: allergies, current medications, past family history, past medical history, past social history, past surgical history and problem list     Review of Systems   Constitutional: Negative  Genitourinary:        As in HPI         Objective:      /82 (BP Location: Left arm, Patient Position: Sitting, Cuff Size: Large)   Ht 5' 6\" (1 676 m)   Wt 76 7 kg (169 lb)   Breastfeeding No   BMI 27 28 kg/m²          Physical Exam  Vitals reviewed  Genitourinary:     Labia:         Right: No tenderness or lesion  Left: No tenderness or lesion  Vagina: Normal  No vaginal discharge, erythema, tenderness, bleeding or lesions  Cervix: No cervical motion tenderness, discharge, friability, lesion, erythema or cervical bleeding  Comments: Faint symmetrical erythema generalized vulva, primarily B/L inner labia and at vaginal opening without rash or lesion or white patches  Lymphadenopathy:      Lower Body: No right inguinal adenopathy  No left inguinal adenopathy  Neurological:      Mental Status: She is alert and oriented to person, place, and time  Psychiatric:         Mood and Affect: Mood normal          Behavior: Behavior normal  Behavior is cooperative           "

## 2023-06-28 LAB
C TRACH DNA SPEC QL NAA+PROBE: ABNORMAL
CANDIDA RRNA VAG QL PROBE: NEGATIVE
G VAGINALIS RRNA GENITAL QL PROBE: NEGATIVE
N GONORRHOEA DNA SPEC QL NAA+PROBE: ABNORMAL
T VAGINALIS RRNA GENITAL QL PROBE: NEGATIVE

## 2023-07-17 ENCOUNTER — APPOINTMENT (OUTPATIENT)
Dept: LAB | Facility: HOSPITAL | Age: 53
End: 2023-07-17
Attending: FAMILY MEDICINE
Payer: COMMERCIAL

## 2023-07-17 DIAGNOSIS — R94.5 NONSPECIFIC ABNORMAL RESULTS OF LIVER FUNCTION STUDY: ICD-10-CM

## 2023-07-17 PROCEDURE — 80074 ACUTE HEPATITIS PANEL: CPT

## 2023-07-17 PROCEDURE — 36415 COLL VENOUS BLD VENIPUNCTURE: CPT

## 2023-07-18 LAB
HAV IGM SER QL: NORMAL
HBV CORE IGM SER QL: NORMAL
HBV SURFACE AG SER QL: NORMAL
HCV AB SER QL: NORMAL

## 2023-08-28 ENCOUNTER — OFFICE VISIT (OUTPATIENT)
Dept: OBGYN CLINIC | Facility: CLINIC | Age: 53
End: 2023-08-28
Payer: COMMERCIAL

## 2023-08-28 VITALS
HEIGHT: 66 IN | SYSTOLIC BLOOD PRESSURE: 118 MMHG | BODY MASS INDEX: 27.35 KG/M2 | WEIGHT: 170.2 LBS | DIASTOLIC BLOOD PRESSURE: 70 MMHG

## 2023-08-28 DIAGNOSIS — Z11.3 SCREEN FOR STD (SEXUALLY TRANSMITTED DISEASE): Primary | ICD-10-CM

## 2023-08-28 DIAGNOSIS — N89.8 VAGINAL DRYNESS: ICD-10-CM

## 2023-08-28 DIAGNOSIS — Z11.59 NEED FOR HEPATITIS B SCREENING TEST: ICD-10-CM

## 2023-08-28 DIAGNOSIS — R35.0 URINARY FREQUENCY: ICD-10-CM

## 2023-08-28 DIAGNOSIS — Z11.59 NEED FOR HEPATITIS C SCREENING TEST: ICD-10-CM

## 2023-08-28 DIAGNOSIS — N89.8 VAGINAL ODOR: ICD-10-CM

## 2023-08-28 DIAGNOSIS — Z11.4 SCREENING FOR HIV (HUMAN IMMUNODEFICIENCY VIRUS): ICD-10-CM

## 2023-08-28 PROCEDURE — 87660 TRICHOMONAS VAGIN DIR PROBE: CPT | Performed by: PHYSICIAN ASSISTANT

## 2023-08-28 PROCEDURE — 87510 GARDNER VAG DNA DIR PROBE: CPT | Performed by: PHYSICIAN ASSISTANT

## 2023-08-28 PROCEDURE — 87591 N.GONORRHOEAE DNA AMP PROB: CPT | Performed by: PHYSICIAN ASSISTANT

## 2023-08-28 PROCEDURE — 87480 CANDIDA DNA DIR PROBE: CPT | Performed by: PHYSICIAN ASSISTANT

## 2023-08-28 PROCEDURE — 99213 OFFICE O/P EST LOW 20 MIN: CPT | Performed by: PHYSICIAN ASSISTANT

## 2023-08-28 PROCEDURE — 87086 URINE CULTURE/COLONY COUNT: CPT | Performed by: PHYSICIAN ASSISTANT

## 2023-08-28 PROCEDURE — 87491 CHLMYD TRACH DNA AMP PROBE: CPT | Performed by: PHYSICIAN ASSISTANT

## 2023-08-28 RX ORDER — ALPRAZOLAM 2 MG/1
TABLET ORAL
COMMUNITY
Start: 2023-08-21

## 2023-08-28 NOTE — PROGRESS NOTES
Assessment/Plan:       Diagnoses and all orders for this visit:    Screen for STD (sexually transmitted disease)  -     HIV 1/2 AG/AB w Reflex [facility] for 2 yr old and above; Future  -     RPR-Syphilis Screening (Total Syphilis IGG/IGM); Future  -     Hepatitis C antibody; Future  -     Hepatitis B surface antigen; Future  -     Hepatitis B surface antigen  -     VAGINOSIS DNA PROBE (AFFIRM)  -     Chlamydia/GC amplified DNA by PCR    Vaginal odor  -     VAGINOSIS DNA PROBE (AFFIRM)    Urinary frequency  -     Urine culture    Vaginal dryness    Screening for HIV (human immunodeficiency virus)  -     HIV 1/2 AG/AB w Reflex [facility] for 2 yr old and above; Future    Need for hepatitis B screening test  -     Hepatitis B surface antigen; Future  -     Hepatitis B surface antigen    Need for hepatitis C screening test  -     Hepatitis C antibody; Future    Other orders  -     ALPRAZolam Noemy Johansen) 2 MG tablet;  (Patient not taking: Reported on 8/28/2023)      56-year-old female presenting today for reculture GC/CT due to invalid results as well as requesting screening blood work order and urinary urgency/frequency, intermittent vaginal discharge and odor as in HPI. Vaginal dryness with sex, have discussed Estrace cream in the past  Negative Pap 9/2022  Postmenopausal    Plan:  Affirm  GC/CT  Urine culture  HIV/hep C/hep B/RPR blood work  We will call patient with results and treat accordingly  Discussed infectious versus noninfectious causes to urinary and vaginal complaints  Pending results can consider trial of vaginal Estrace cream 2 times weekly -we will discuss at time of result review.   Stressed importance of use of oil-based lubrication during sex    Patient has annual scheduled 10/10 and will keep this appointment    Chief Complaint   Patient presents with   • std screen     Returning  for gc/ch  re-culture, no complaints, no UTI symptoms , wants STD testing done worried about partner cheating , some pain right abdomen          Subjective:      Patient ID: Vanessa Gilliam is a 47 y.o. female.    49y/o F presenting today for Std screening and vaginal sxs. GC/CT culture invalid from June - needs re-culture. Also desires STD VW.    States she has urinary frequency/urgency, odor if she has sex with her partner w/o condom. If using condom she has no issues. She denies vaginal itching. Did have some abnormal discharge, odor. She did monistat OTC. Denies PM bleeding. Denies pelvic pain specifically. Ongoing vaginal dryness w/ sex, does not use lubrication. The following portions of the patient's history were reviewed and updated as appropriate: allergies, current medications, past family history, past medical history, past social history, past surgical history and problem list.    Review of Systems   Constitutional: Negative. Gastrointestinal: Negative. Genitourinary:        As in HPI         Objective:      /70 (BP Location: Left arm, Patient Position: Sitting, Cuff Size: Large)   Ht 5' 6" (1.676 m)   Wt 77.2 kg (170 lb 3.2 oz)   Breastfeeding No   BMI 27.47 kg/m²          Physical Exam  Vitals reviewed. Constitutional:       Appearance: Normal appearance. Genitourinary:     General: Normal vulva. Labia:         Right: No rash, tenderness or lesion. Left: No rash, tenderness or lesion. Vagina: No vaginal discharge, erythema, tenderness, bleeding or lesions. Cervix: No cervical motion tenderness, discharge, friability, lesion, erythema or cervical bleeding. Uterus: Not tender. Adnexa:         Right: No tenderness. Left: No tenderness. Comments: Minimal intravaginal atrophy noted on exam  Lymphadenopathy:      Lower Body: No right inguinal adenopathy. No left inguinal adenopathy. Neurological:      Mental Status: She is alert and oriented to person, place, and time.    Psychiatric:         Mood and Affect: Mood normal.         Behavior: Behavior normal. Behavior is cooperative.

## 2023-08-29 LAB
C TRACH DNA SPEC QL NAA+PROBE: NEGATIVE
N GONORRHOEA DNA SPEC QL NAA+PROBE: NEGATIVE

## 2023-08-30 LAB
BACTERIA UR CULT: NORMAL
CANDIDA RRNA VAG QL PROBE: NEGATIVE
G VAGINALIS RRNA GENITAL QL PROBE: NEGATIVE
T VAGINALIS RRNA GENITAL QL PROBE: NEGATIVE

## 2023-09-06 ENCOUNTER — TELEPHONE (OUTPATIENT)
Dept: OBGYN CLINIC | Facility: CLINIC | Age: 53
End: 2023-09-06

## 2023-09-06 NOTE — TELEPHONE ENCOUNTER
I apologize, was still waiting on her STD blood work results since her testing so far was all negative. Her urine culture was negative for infection. Her chlamydia and gonorrhea result was negative. Her vaginal culture was negative for BV/yeast/trichomonas.

## 2023-09-26 ENCOUNTER — HOSPITAL ENCOUNTER (EMERGENCY)
Facility: HOSPITAL | Age: 53
Discharge: HOME | End: 2023-09-26
Attending: EMERGENCY MEDICINE | Admitting: EMERGENCY MEDICINE
Payer: COMMERCIAL

## 2023-09-26 ENCOUNTER — APPOINTMENT (EMERGENCY)
Dept: RADIOLOGY | Facility: HOSPITAL | Age: 53
End: 2023-09-26
Payer: COMMERCIAL

## 2023-09-26 VITALS
BODY MASS INDEX: 28.28 KG/M2 | RESPIRATION RATE: 18 BRPM | OXYGEN SATURATION: 96 % | HEIGHT: 66 IN | WEIGHT: 176 LBS | DIASTOLIC BLOOD PRESSURE: 69 MMHG | TEMPERATURE: 97.9 F | HEART RATE: 97 BPM | SYSTOLIC BLOOD PRESSURE: 136 MMHG

## 2023-09-26 DIAGNOSIS — R06.02 SOB (SHORTNESS OF BREATH): Primary | ICD-10-CM

## 2023-09-26 LAB
ALBUMIN SERPL BCP-MCNC: 4.8 G/DL (ref 3.5–5)
ALP SERPL-CCNC: 97 U/L (ref 34–104)
ALT SERPL W P-5'-P-CCNC: 79 U/L (ref 7–52)
ANION GAP SERPL CALCULATED.3IONS-SCNC: 12 MMOL/L
AST SERPL W P-5'-P-CCNC: 43 U/L (ref 13–39)
BASOPHILS # BLD AUTO: 0.06 THOUSANDS/ÂΜL (ref 0–0.1)
BASOPHILS NFR BLD AUTO: 1 % (ref 0–1)
BILIRUB SERPL-MCNC: 0.64 MG/DL (ref 0.2–1)
BUN SERPL-MCNC: 13 MG/DL (ref 5–25)
CALCIUM SERPL-MCNC: 9.9 MG/DL (ref 8.4–10.2)
CARDIAC TROPONIN I PNL SERPL HS: 2 NG/L
CHLORIDE SERPL-SCNC: 97 MMOL/L (ref 96–108)
CO2 SERPL-SCNC: 25 MMOL/L (ref 21–32)
CREAT SERPL-MCNC: 1.35 MG/DL (ref 0.6–1.3)
D DIMER PPP FEU-MCNC: 0.33 UG/ML FEU
EOSINOPHIL # BLD AUTO: 0.07 THOUSAND/ÂΜL (ref 0–0.61)
EOSINOPHIL NFR BLD AUTO: 1 % (ref 0–6)
ERYTHROCYTE [DISTWIDTH] IN BLOOD BY AUTOMATED COUNT: 13.6 % (ref 11.6–15.1)
GFR SERPL CREATININE-BSD FRML MDRD: 45 ML/MIN/1.73SQ M
GLUCOSE SERPL-MCNC: 391 MG/DL (ref 65–140)
HCT VFR BLD AUTO: 46.3 % (ref 34.8–46.1)
HGB BLD-MCNC: 14.9 G/DL (ref 11.5–15.4)
IMM GRANULOCYTES # BLD AUTO: 0.04 THOUSAND/UL (ref 0–0.2)
IMM GRANULOCYTES NFR BLD AUTO: 0 % (ref 0–2)
LIPASE SERPL-CCNC: 38 U/L (ref 11–82)
LYMPHOCYTES # BLD AUTO: 1.36 THOUSANDS/ÂΜL (ref 0.6–4.47)
LYMPHOCYTES NFR BLD AUTO: 14 % (ref 14–44)
MCH RBC QN AUTO: 25.7 PG (ref 26.8–34.3)
MCHC RBC AUTO-ENTMCNC: 32.2 G/DL (ref 31.4–37.4)
MCV RBC AUTO: 80 FL (ref 82–98)
MONOCYTES # BLD AUTO: 0.34 THOUSAND/ÂΜL (ref 0.17–1.22)
MONOCYTES NFR BLD AUTO: 4 % (ref 4–12)
NEUTROPHILS # BLD AUTO: 7.62 THOUSANDS/ÂΜL (ref 1.85–7.62)
NEUTS SEG NFR BLD AUTO: 80 % (ref 43–75)
NRBC BLD AUTO-RTO: 0 /100 WBCS
PLATELET # BLD AUTO: 223 THOUSANDS/UL (ref 149–390)
PMV BLD AUTO: 11 FL (ref 8.9–12.7)
POTASSIUM SERPL-SCNC: 4 MMOL/L (ref 3.5–5.3)
PROT SERPL-MCNC: 7.6 G/DL (ref 6.4–8.4)
RBC # BLD AUTO: 5.79 MILLION/UL (ref 3.81–5.12)
SODIUM SERPL-SCNC: 134 MMOL/L (ref 135–147)
WBC # BLD AUTO: 9.49 THOUSAND/UL (ref 4.31–10.16)

## 2023-09-26 PROCEDURE — 85025 COMPLETE CBC W/AUTO DIFF WBC: CPT | Performed by: EMERGENCY MEDICINE

## 2023-09-26 PROCEDURE — 99285 EMERGENCY DEPT VISIT HI MDM: CPT | Performed by: EMERGENCY MEDICINE

## 2023-09-26 PROCEDURE — 83690 ASSAY OF LIPASE: CPT | Performed by: EMERGENCY MEDICINE

## 2023-09-26 PROCEDURE — 84484 ASSAY OF TROPONIN QUANT: CPT | Performed by: EMERGENCY MEDICINE

## 2023-09-26 PROCEDURE — 85379 FIBRIN DEGRADATION QUANT: CPT | Performed by: EMERGENCY MEDICINE

## 2023-09-26 PROCEDURE — 80053 COMPREHEN METABOLIC PANEL: CPT | Performed by: EMERGENCY MEDICINE

## 2023-09-26 PROCEDURE — 71045 X-RAY EXAM CHEST 1 VIEW: CPT

## 2023-09-26 PROCEDURE — 99285 EMERGENCY DEPT VISIT HI MDM: CPT

## 2023-09-26 PROCEDURE — 96360 HYDRATION IV INFUSION INIT: CPT

## 2023-09-26 PROCEDURE — 93005 ELECTROCARDIOGRAM TRACING: CPT

## 2023-09-26 PROCEDURE — 36415 COLL VENOUS BLD VENIPUNCTURE: CPT | Performed by: EMERGENCY MEDICINE

## 2023-09-26 RX ORDER — FLUTICASONE PROPIONATE 50 MCG
1 SPRAY, SUSPENSION (ML) NASAL DAILY
Qty: 16 G | Refills: 0 | Status: SHIPPED | OUTPATIENT
Start: 2023-09-26

## 2023-09-26 RX ORDER — SODIUM CHLORIDE 9 MG/ML
3 INJECTION INTRAVENOUS
Status: DISCONTINUED | OUTPATIENT
Start: 2023-09-26 | End: 2023-09-26 | Stop reason: HOSPADM

## 2023-09-26 RX ADMIN — SODIUM CHLORIDE 1000 ML: 0.9 INJECTION, SOLUTION INTRAVENOUS at 14:30

## 2023-09-26 NOTE — ED NOTES
Discharge reviewed with pt. Pt verbalized understanding and has no further questions at this time. Pt ambulatory off unit with steady gait.      Salena Almaguer RN  09/26/23 9635

## 2023-09-26 NOTE — ED PROVIDER NOTES
History  Chief Complaint   Patient presents with   • Shortness of Breath     Patient states she has asthma and is diabetic, she bought a "lung cleanse" medication from [facility] that she started taking a few days ago, but now feels her symptoms are worse, her rescue inhaler is not working     This is a 47year old female who presents to the ED with shortness of breath. The patient states it started a few days ago. It has been associated with chest pain. She denies fevers or chills. She states she started a "lung detox" the other day and her breathing has been getting worse. She denies abd pain, back pain, or nausea or vomiting. She denies leg pain or swelling. Prior to Admission Medications   Prescriptions Last Dose Informant Patient Reported? Taking?    ALPRAZolam (XANAX) 0.5 mg tablet   Yes No   Sig: Take 0.5 mg by mouth daily as needed   Patient not taking: Reported on 8/28/2023   ALPRAZolam Olga Pencil) 2 MG tablet   Yes No   Patient not taking: Reported on 8/28/2023   Aspirin Low Dose 81 MG chewable tablet   Yes No   Sig: Chew 81 mg daily Chew   Patient not taking: Reported on 8/28/2023   BD Pen Needle Maile 2nd Gen 32G X 4 MM MISC   Yes No   Sig: Use as directed   Patient not taking: Reported on 8/28/2023   Cholecalciferol (Vitamin D3) 50 MCG (2000 UT) TABS   Yes No   Sig: Take 2,000 Units by mouth daily   Lantus SoloStar 100 units/mL injection pen   Yes No   Multiple Vitamins-Minerals (ONE-A-DAY FOR HER VITACRAVES PO)   Yes No   Sig: Take by mouth   PARoxetine (PAXIL) 30 mg tablet   Yes No   Sig: Take 30 mg by mouth daily   Patient not taking: Reported on 8/28/2023   QUEtiapine (SEROquel) 300 mg tablet   Yes No   Sig: Take 400 mg by mouth daily at bedtime   Trulicity 2.51 HE/6.0MY injection   Yes No   Sig: INJECT 0.75 MG SUBCUTANEOUSLY ONE TIME PER WEEK   Patient not taking: Reported on 8/28/2023   albuterol (PROVENTIL HFA,VENTOLIN HFA) 90 mcg/act inhaler   Yes No   Sig: Inhale 2 puffs every 4 - 6 hours as needed   clotrimazole-betamethasone (LOTRISONE) 1-0.05 % cream   No No   Sig: Apply topically 2 (two) times a day To affected vulvar areas for itching externally only. Patient not taking: Reported on 8/28/2023   gabapentin (NEURONTIN) 300 mg capsule   Yes No   Sig: Take 300 mg by mouth 2 (two) times a day as needed   Patient not taking: Reported on 8/28/2023   lisinopril (ZESTRIL) 2.5 mg tablet   Yes No   Sig: Take 2.5 mg by mouth daily   metFORMIN (GLUCOPHAGE) 1000 MG tablet   Yes No   Sig: Take 1,000 mg by mouth every morning   nicotine (NICODERM CQ) 21 mg/24 hr TD 24 hr patch   Yes No   ramipril (ALTACE) 2.5 mg capsule   Yes No   Sig: Take 2.5 mg by mouth daily   rosuvastatin (CRESTOR) 10 MG tablet   Yes No   Sig: Take 10 mg by mouth daily   Patient not taking: Reported on 8/28/2023   rosuvastatin (CRESTOR) 20 MG tablet   Yes No      Facility-Administered Medications: None       Past Medical History:   Diagnosis Date   • Asthma    • Depression    • Diabetes mellitus ([address])     type 2   • Hyperlipidemia    • Hypertension        Past Surgical History:   Procedure Laterality Date   • KNEE ARTHROCENTESIS     • MOUTH SURGERY         Family History   Problem Relation Age of Onset   • Diabetes Mother    • Diabetes Father    • Ovarian cancer Sister    • Colon cancer Brother      I have reviewed and agree with the history as documented. E-Cigarette/Vaping   • E-Cigarette Use Never User      E-Cigarette/Vaping Substances   • THC No    • CBD No      Social History     Tobacco Use   • Smoking status: Every Day     Packs/day: 1.00     Years: 15.00     Total pack years: 15.00     Types: Cigarettes   • Smokeless tobacco: Never   Vaping Use   • Vaping Use: Never used   Substance Use Topics   • Alcohol use: Not Currently   • Drug use: Never       Review of Systems   All other systems reviewed and are negative.       Physical Exam  Physical Exam  Constitutional:  Vital signs reviewed, patient appears nontoxic, no acute distress  Eyes: Pupils equal round reactive to light and accommodation, extraocular muscles intact  HEENT: trachea midline, no JVD, moist mucous membranes  Respiratory: lung sounds clear throughout, no rhonchi, no rales  Cardiovascular: tachycardic rate, reguular rhythm, no murmurs or rubs  Abdomen: soft, nontender, nondistended, no rebound or guarding  Back: no CVA tenderness, normal inspection  Extremities: no edema, pulses equal in all 4 extremities  Neuro: awake, alert, oriented, no focal weakness  Skin: warm, dry, intact, no rashes noted    Vital Signs  ED Triage Vitals [09/26/23 1349]   Temperature Pulse Respirations Blood Pressure SpO2   97.9 °F (36.6 °C) (!) 108 20 (!) 184/82 98 %      Temp Source Heart Rate Source Patient Position - Orthostatic VS BP Location FiO2 (%)   Oral Monitor Sitting Left arm --      Pain Score       5           Vitals:    09/26/23 1349 09/26/23 1400 09/26/23 1415 09/26/23 1513   BP: (!) 184/82 136/69     Pulse: (!) 108 103 103 97   Patient Position - Orthostatic VS: Sitting            Visual Acuity      ED Medications  Medications   sodium chloride (PF) 0.9 % injection 3 mL (has no administration in time range)   sodium chloride 0.9 % bolus 1,000 mL (0 mL Intravenous Stopped 9/26/23 1555)       Diagnostic Studies  Results Reviewed     Procedure Component Value Units Date/Time    HS Troponin I 4hr [[id number]]     Lab Status: No result Specimen: Blood     HS Troponin I 2hr [[id number]]     Lab Status: No result Specimen: Blood     HS Troponin 0hr (reflex protocol) [[id number]]  (Normal) Collected: 09/26/23 1423    Lab Status: Final result Specimen: Blood from Arm, Right Updated: 09/26/23 1457     hs TnI 0hr 2 ng/L     Lipase [[id number]]  (Normal) Collected: 09/26/23 1423    Lab Status: Final result Specimen: Blood from Arm, Right Updated: 09/26/23 1452     Lipase 38 u/L     Comprehensive metabolic panel [[id number]]  (Abnormal) Collected: 09/26/23 1423    Lab Status: Final result Specimen: Blood from Arm, Right Updated: 09/26/23 1452     Sodium 134 mmol/L      Potassium 4.0 mmol/L      Chloride 97 mmol/L      CO2 25 mmol/L      ANION GAP 12 mmol/L      BUN 13 mg/dL      Creatinine 1.35 mg/dL      Glucose 391 mg/dL      Calcium 9.9 mg/dL      AST 43 U/L      ALT 79 U/L      Alkaline Phosphatase 97 U/L      Total Protein 7.6 g/dL      Albumin 4.8 g/dL      Total Bilirubin 0.64 mg/dL      eGFR 45 ml/min/1.73sq m     Narrative:      [place] guidelines for Chronic Kidney Disease (CKD):   •  Stage 1 with normal or high GFR (GFR > 90 mL/min/1.73 square meters)  •  Stage 2 Mild CKD (GFR = 60-89 mL/min/1.73 square meters)  •  Stage 3A Moderate CKD (GFR = 45-59 mL/min/1.73 square meters)  •  Stage 3B Moderate CKD (GFR = 30-44 mL/min/1.73 square meters)  •  Stage 4 Severe CKD (GFR = 15-29 mL/min/1.73 square meters)  •  Stage 5 End Stage CKD (GFR <15 mL/min/1.73 square meters)  Note: GFR calculation is accurate only with a steady state creatinine    D-dimer, quantitative [[id number]]  (Normal) Collected: 09/26/23 1423    Lab Status: Final result Specimen: Blood from Arm, Right Updated: 09/26/23 1441     D-Dimer, Quant 0.33 ug/ml FEU     Narrative: In the evaluation for possible pulmonary embolism, in the appropriate (Well's Score of 4 or less) patient, the age adjusted d-dimer cutoff for this patient can be calculated as:    Age x 0.01 (in ug/mL) for Age-adjusted D-dimer exclusion threshold for a patient over 50 years.     CBC and differential [[id number]]  (Abnormal) Collected: 09/26/23 1423    Lab Status: Final result Specimen: Blood from Arm, Right Updated: 09/26/23 1431     WBC 9.49 Thousand/uL      RBC 5.79 Million/uL      Hemoglobin 14.9 g/dL      Hematocrit 46.3 %      MCV 80 fL      MCH 25.7 pg      MCHC 32.2 g/dL      RDW 13.6 %      MPV 11.0 fL      Platelets 955 Thousands/uL      nRBC 0 /100 WBCs      Neutrophils Relative 80 %      Immat GRANS % 0 % Lymphocytes Relative 14 %      Monocytes Relative 4 %      Eosinophils Relative 1 %      Basophils Relative 1 %      Neutrophils Absolute 7.62 Thousands/µL      Immature Grans Absolute 0.04 Thousand/uL      Lymphocytes Absolute 1.36 Thousands/µL      Monocytes Absolute 0.34 Thousand/µL      Eosinophils Absolute 0.07 Thousand/µL      Basophils Absolute 0.06 Thousands/µL                  X-ray chest 1 view portable   ED Interpretation by Devin Sorto DO (09/26 1436)   No pneumonia or pneumothorax      Final Result by Donna Elaine MD (09/26 1533)      No acute cardiopulmonary disease. Resident: Steve Nguyen, the attending radiologist, have reviewed the images and agree with the final report above. Workstation performed: [id number]                    Procedures  ECG 12 Lead Documentation Only    Date/Time: 9/26/2023 5:43 PM    Performed by: Devin Sorto DO  Authorized by: Devin Sorto DO    ECG reviewed by me, the ED Provider: yes    Patient location:  ED  Comments:      EKG independently interpreted by me, sinus tachycardia, rate 110, normal [place], normal QTc, no STEMI             ED Course  ED Course as of 09/26/23 1747 Tue Sep 26, 2023   1743 The patient had a chest x-ray that was independently interpreted by me. The patient had lab work that was significant for a white count of 9.49, a creatinine of 1.35. Compared to her prior creatinine of 0.6 this is mildly elevated. I discussed with the patient's her hydration status. I also discussed that we gave the patient 1 L of fluid. She is to return to her primary care office and have repeat laboratory evaluation. She had a negative D-dimer. She had a troponin of 2. Given her heart score of 3. She is low risk for ACS. She does have a glucose of 391, but she has a anion gap of 12. She is feeling much better after IV fluids. She will be discharged follow-up to her primary care physician.              HEART Risk Score    Flowsheet Row Most Recent Value   Heart Score Risk Calculator    History 0 Filed at: 09/26/2023 1744   ECG 1 Filed at: 09/26/2023 1744   Age 1 Filed at: 09/26/2023 1744   Risk Factors 1 Filed at: 09/26/2023 1744   Troponin 0 Filed at: 09/26/2023 1744   HEART Score 3 Filed at: 09/26/2023 1744                        SBIRT 22yo+    Flowsheet Row Most Recent Value   Initial Alcohol Screen: US AUDIT-C     1. How often do you have a drink containing alcohol? 0 Filed at: 09/26/2023 1403   2. How many drinks containing alcohol do you have on a typical day you are drinking? 0 Filed at: 09/26/2023 1403   3b. FEMALE Any Age, or MALE 65+: How often do you have 4 or more drinks on one occassion? 0 Filed at: 09/26/2023 1403   Audit-C Score 0 Filed at: 09/26/2023 1403   QUE: How many times in the past year have you. .. Used an illegal drug or used a prescription medication for non-medical reasons? Never Filed at: 09/26/2023 1403                    Medical Decision Making  This is a 47year old female who presents to the ED with shortness of breath. I considered acs, pneumonia, pneumothorax, COPD, pulmonary embolism. These and other diagnoses were considered. SOB (shortness of breath): acute illness or injury  Amount and/or Complexity of Data Reviewed  Labs: ordered. Decision-making details documented in ED Course. Radiology: ordered and independent interpretation performed. Decision-making details documented in ED Course. ECG/medicine tests: ordered and independent interpretation performed. Decision-making details documented in ED Course. Risk  Prescription drug management.           Disposition  Final diagnoses:   SOB (shortness of breath)     Time reflects when diagnosis was documented in both MDM as applicable and the Disposition within this note     Time User Action Codes Description Comment    9/26/2023  3:07 PM Dasia Watson Add [R06.02] SOB (shortness of breath)       ED Disposition     ED Disposition   Discharge    Condition   Stable    Date/Time   Tue Sep 26, 2023  3:07 PM    [facility] discharge to home/self care. Follow-up Information     Follow up With Specialties Details Why Contact Info Additional Information    Carlo Fallon MD Family Medicine   [address] [place] [address]  [address] [phone]       [address] [facility] Emergency Medicine  If symptoms worsen [address] [place] [address] [phone]  [address] [facility] Emergency Department, [address], [address] [facility]          Discharge Medication List as of 9/26/2023  3:08 PM      START taking these medications    Details   fluticasone (FLONASE) 50 mcg/act nasal spray 1 spray into each nostril daily, Starting Tue 9/26/2023, Normal         CONTINUE these medications which have NOT CHANGED    Details   albuterol (PROVENTIL HFA,VENTOLIN HFA) 90 mcg/act inhaler Inhale 2 puffs every 4 - 6 hours as needed, Starting Wed 5/17/2023, Historical Med      !! ALPRAZolam (XANAX) 0.5 mg tablet Take 0.5 mg by mouth daily as needed, Starting Sat 3/13/2021, Historical Med      !! ALPRAZolam Tony Pershing) 2 MG tablet Starting Mon 8/21/2023, Historical Med      Aspirin Low Dose 81 MG chewable tablet Chew 81 mg daily Chew, Starting Thu 4/1/2021, Historical Med      BD Pen Needle Maile 2nd Gen 32G X 4 MM MISC Use as directed, Starting Sun 1/22/2023, Historical Med      Cholecalciferol (Vitamin D3) 50 MCG (2000 UT) TABS Take 2,000 Units by mouth daily, Starting Wed 2/24/2021, Historical Med      clotrimazole-betamethasone (LOTRISONE) 1-0.05 % cream Apply topically 2 (two) times a day To affected vulvar areas for itching externally only. , Starting Mon 6/26/2023, Normal      gabapentin (NEURONTIN) 300 mg capsule Take 300 mg by mouth 2 (two) times a day as needed, Starting Thu 6/1/2023, Historical Med      Lantus SoloStar 100 units/mL injection pen Historical Med lisinopril (ZESTRIL) 2.5 mg tablet Take 2.5 mg by mouth daily, Starting Wed 3/24/2021, Historical Med      metFORMIN (GLUCOPHAGE) 1000 MG tablet Take 1,000 mg by mouth every morning, Starting Tue 2/7/2023, Historical Med      Multiple Vitamins-Minerals (ONE-A-DAY FOR HER VITACRAVES PO) Take by mouth, Historical Med      nicotine (NICODERM CQ) 21 mg/24 hr TD 24 hr patch Starting Mon 5/8/2023, Historical Med      PARoxetine (PAXIL) 30 mg tablet Take 30 mg by mouth daily, Starting Sat 2/4/2023, Historical Med      QUEtiapine (SEROquel) 300 mg tablet Take 400 mg by mouth daily at bedtime, Starting Wed 3/8/2023, Historical Med      ramipril (ALTACE) 2.5 mg capsule Take 2.5 mg by mouth daily, Starting Wed 5/17/2023, Historical Med      !! rosuvastatin (CRESTOR) 10 MG tablet Take 10 mg by mouth daily, Starting Thu 4/1/2021, Historical Med      !! rosuvastatin (CRESTOR) 20 MG tablet Starting Sun 6/18/2023, Historical Med      Trulicity 3.56 CS/3.4XR injection INJECT 0.75 MG SUBCUTANEOUSLY ONE TIME PER WEEK, Historical Med       !! - Potential duplicate medications found. Please discuss with provider. No discharge procedures on file.     PDMP Review       Value Time User    PDMP Reviewed  Yes 9/23/2022  9:32 AM Arturo Chisholm DO          ED Provider  Electronically Signed by           Nalini Peguero DO  09/26/23 8480

## 2023-09-27 LAB
ATRIAL RATE: 110 BPM
P AXIS: 53 DEGREES
PR INTERVAL: 126 MS
QRS AXIS: 40 DEGREES
QRSD INTERVAL: 76 MS
QT INTERVAL: 340 MS
QTC INTERVAL: 460 MS
T WAVE AXIS: 5 DEGREES
VENTRICULAR RATE: 110 BPM

## 2023-09-27 PROCEDURE — 93010 ELECTROCARDIOGRAM REPORT: CPT | Performed by: INTERNAL MEDICINE

## 2023-10-11 ENCOUNTER — APPOINTMENT (EMERGENCY)
Dept: RADIOLOGY | Facility: HOSPITAL | Age: 53
End: 2023-10-11
Payer: COMMERCIAL

## 2023-10-11 ENCOUNTER — HOSPITAL ENCOUNTER (EMERGENCY)
Facility: HOSPITAL | Age: 53
Discharge: HOME | End: 2023-10-11
Attending: EMERGENCY MEDICINE
Payer: COMMERCIAL

## 2023-10-11 VITALS
WEIGHT: 168 LBS | BODY MASS INDEX: 27.12 KG/M2 | DIASTOLIC BLOOD PRESSURE: 97 MMHG | SYSTOLIC BLOOD PRESSURE: 148 MMHG | OXYGEN SATURATION: 98 % | HEART RATE: 97 BPM | TEMPERATURE: 98.1 F | RESPIRATION RATE: 16 BRPM

## 2023-10-11 DIAGNOSIS — K14.0 TRANSIENT LINGUAL PAPILLITIS: ICD-10-CM

## 2023-10-11 DIAGNOSIS — R05.1 ACUTE COUGH: Primary | ICD-10-CM

## 2023-10-11 DIAGNOSIS — R06.02 SHORTNESS OF BREATH: ICD-10-CM

## 2023-10-11 LAB
ALBUMIN SERPL BCP-MCNC: 4.5 G/DL (ref 3.5–5)
ALP SERPL-CCNC: 112 U/L (ref 34–104)
ALT SERPL W P-5'-P-CCNC: 71 U/L (ref 7–52)
ANION GAP SERPL CALCULATED.3IONS-SCNC: 8 MMOL/L
AST SERPL W P-5'-P-CCNC: 25 U/L (ref 13–39)
BASOPHILS # BLD AUTO: 0.03 THOUSANDS/ÂΜL (ref 0–0.1)
BASOPHILS NFR BLD AUTO: 1 % (ref 0–1)
BILIRUB SERPL-MCNC: 0.54 MG/DL (ref 0.2–1)
BNP SERPL-MCNC: 14 PG/ML (ref 0–100)
BUN SERPL-MCNC: 10 MG/DL (ref 5–25)
CALCIUM SERPL-MCNC: 9.5 MG/DL (ref 8.4–10.2)
CARDIAC TROPONIN I PNL SERPL HS: <2 NG/L
CHLORIDE SERPL-SCNC: 100 MMOL/L (ref 96–108)
CO2 SERPL-SCNC: 28 MMOL/L (ref 21–32)
CREAT SERPL-MCNC: 0.61 MG/DL (ref 0.6–1.3)
EOSINOPHIL # BLD AUTO: 0.18 THOUSAND/ÂΜL (ref 0–0.61)
EOSINOPHIL NFR BLD AUTO: 3 % (ref 0–6)
ERYTHROCYTE [DISTWIDTH] IN BLOOD BY AUTOMATED COUNT: 13.3 % (ref 11.6–15.1)
FLUAV RNA RESP QL NAA+PROBE: NEGATIVE
FLUBV RNA RESP QL NAA+PROBE: NEGATIVE
GFR SERPL CREATININE-BSD FRML MDRD: 104 ML/MIN/1.73SQ M
GLUCOSE SERPL-MCNC: 328 MG/DL (ref 65–140)
HCT VFR BLD AUTO: 44.5 % (ref 34.8–46.1)
HGB BLD-MCNC: 14.1 G/DL (ref 11.5–15.4)
IMM GRANULOCYTES # BLD AUTO: 0.04 THOUSAND/UL (ref 0–0.2)
IMM GRANULOCYTES NFR BLD AUTO: 1 % (ref 0–2)
LYMPHOCYTES # BLD AUTO: 1.26 THOUSANDS/ÂΜL (ref 0.6–4.47)
LYMPHOCYTES NFR BLD AUTO: 21 % (ref 14–44)
MCH RBC QN AUTO: 25.1 PG (ref 26.8–34.3)
MCHC RBC AUTO-ENTMCNC: 31.7 G/DL (ref 31.4–37.4)
MCV RBC AUTO: 79 FL (ref 82–98)
MONOCYTES # BLD AUTO: 0.29 THOUSAND/ÂΜL (ref 0.17–1.22)
MONOCYTES NFR BLD AUTO: 5 % (ref 4–12)
NEUTROPHILS # BLD AUTO: 4.23 THOUSANDS/ÂΜL (ref 1.85–7.62)
NEUTS SEG NFR BLD AUTO: 69 % (ref 43–75)
NRBC BLD AUTO-RTO: 0 /100 WBCS
PLATELET # BLD AUTO: 204 THOUSANDS/UL (ref 149–390)
PMV BLD AUTO: 10.7 FL (ref 8.9–12.7)
POTASSIUM SERPL-SCNC: 3.7 MMOL/L (ref 3.5–5.3)
PROT SERPL-MCNC: 7.4 G/DL (ref 6.4–8.4)
RBC # BLD AUTO: 5.62 MILLION/UL (ref 3.81–5.12)
RSV RNA RESP QL NAA+PROBE: NEGATIVE
S PYO DNA THROAT QL NAA+PROBE: NOT DETECTED
SARS-COV-2 RNA RESP QL NAA+PROBE: NEGATIVE
SODIUM SERPL-SCNC: 136 MMOL/L (ref 135–147)
WBC # BLD AUTO: 6.03 THOUSAND/UL (ref 4.31–10.16)

## 2023-10-11 PROCEDURE — 87651 STREP A DNA AMP PROBE: CPT | Performed by: EMERGENCY MEDICINE

## 2023-10-11 PROCEDURE — 85025 COMPLETE CBC W/AUTO DIFF WBC: CPT | Performed by: EMERGENCY MEDICINE

## 2023-10-11 PROCEDURE — 71045 X-RAY EXAM CHEST 1 VIEW: CPT

## 2023-10-11 PROCEDURE — 94640 AIRWAY INHALATION TREATMENT: CPT

## 2023-10-11 PROCEDURE — 80053 COMPREHEN METABOLIC PANEL: CPT | Performed by: EMERGENCY MEDICINE

## 2023-10-11 PROCEDURE — 83880 ASSAY OF NATRIURETIC PEPTIDE: CPT | Performed by: EMERGENCY MEDICINE

## 2023-10-11 PROCEDURE — 96374 THER/PROPH/DIAG INJ IV PUSH: CPT

## 2023-10-11 PROCEDURE — 99284 EMERGENCY DEPT VISIT MOD MDM: CPT

## 2023-10-11 PROCEDURE — 36415 COLL VENOUS BLD VENIPUNCTURE: CPT | Performed by: EMERGENCY MEDICINE

## 2023-10-11 PROCEDURE — 84484 ASSAY OF TROPONIN QUANT: CPT | Performed by: EMERGENCY MEDICINE

## 2023-10-11 PROCEDURE — 0241U HB NFCT DS VIR RESP RNA 4 TRGT: CPT | Performed by: EMERGENCY MEDICINE

## 2023-10-11 PROCEDURE — 99285 EMERGENCY DEPT VISIT HI MDM: CPT | Performed by: EMERGENCY MEDICINE

## 2023-10-11 RX ORDER — METHYLPREDNISOLONE SODIUM SUCCINATE 125 MG/2ML
125 INJECTION, POWDER, LYOPHILIZED, FOR SOLUTION INTRAMUSCULAR; INTRAVENOUS ONCE
Status: COMPLETED | OUTPATIENT
Start: 2023-10-11 | End: 2023-10-11

## 2023-10-11 RX ORDER — ALBUTEROL SULFATE 90 UG/1
2 AEROSOL, METERED RESPIRATORY (INHALATION) EVERY 4 HOURS PRN
Qty: 6.7 G | Refills: 0 | Status: SHIPPED | OUTPATIENT
Start: 2023-10-11 | End: 2023-11-10

## 2023-10-11 RX ORDER — DIPHENHYDRAMINE HYDROCHLORIDE AND LIDOCAINE HYDROCHLORIDE AND ALUMINUM HYDROXIDE AND MAGNESIUM HYDRO
10 KIT EVERY 4 HOURS PRN
Qty: 300 ML | Refills: 0 | Status: SHIPPED | OUTPATIENT
Start: 2023-10-11 | End: 2023-10-11

## 2023-10-11 RX ORDER — BENZONATATE 100 MG/1
100 CAPSULE ORAL ONCE
Status: COMPLETED | OUTPATIENT
Start: 2023-10-11 | End: 2023-10-11

## 2023-10-11 RX ORDER — DIPHENHYDRAMINE HYDROCHLORIDE AND LIDOCAINE HYDROCHLORIDE AND ALUMINUM HYDROXIDE AND MAGNESIUM HYDRO
10 KIT EVERY 4 HOURS PRN
Qty: 300 ML | Refills: 0 | Status: SHIPPED | OUTPATIENT
Start: 2023-10-11 | End: 2023-10-16

## 2023-10-11 RX ORDER — ALBUTEROL SULFATE 2.5 MG/3ML
5 SOLUTION RESPIRATORY (INHALATION) ONCE
Status: COMPLETED | OUTPATIENT
Start: 2023-10-11 | End: 2023-10-11

## 2023-10-11 RX ORDER — DIPHENHYDRAMINE HYDROCHLORIDE AND LIDOCAINE HYDROCHLORIDE AND ALUMINUM HYDROXIDE AND MAGNESIUM HYDRO
10 KIT ONCE
Status: COMPLETED | OUTPATIENT
Start: 2023-10-11 | End: 2023-10-11

## 2023-10-11 RX ORDER — PREDNISONE 20 MG/1
40 TABLET ORAL DAILY
Qty: 10 TABLET | Refills: 0 | Status: SHIPPED | OUTPATIENT
Start: 2023-10-11 | End: 2023-10-16

## 2023-10-11 RX ORDER — BENZONATATE 100 MG/1
100 CAPSULE ORAL 3 TIMES DAILY PRN
Qty: 15 CAPSULE | Refills: 0 | Status: SHIPPED | OUTPATIENT
Start: 2023-10-11 | End: 2023-10-16

## 2023-10-11 RX ADMIN — IPRATROPIUM BROMIDE 0.5 MG: 0.5 SOLUTION RESPIRATORY (INHALATION) at 10:48

## 2023-10-11 RX ADMIN — ALBUTEROL SULFATE 5 MG: 2.5 SOLUTION RESPIRATORY (INHALATION) at 10:48

## 2023-10-11 RX ADMIN — METHYLPREDNISOLONE SODIUM SUCCINATE 125 MG: 125 INJECTION, POWDER, FOR SOLUTION INTRAMUSCULAR; INTRAVENOUS at 10:43

## 2023-10-11 RX ADMIN — BENZONATATE 100 MG: 100 CAPSULE ORAL at 12:21

## 2023-10-11 RX ADMIN — DIPHENHYDRAMINE HYDROCHLORIDE AND LIDOCAINE HYDROCHLORIDE AND ALUMINUM HYDROXIDE AND MAGNESIUM HYDRO 10 ML: KIT at 10:47

## 2023-10-11 NOTE — DISCHARGE INSTRUCTIONS
Follow-up with your primary care physician. Use the prescribed medications as directed. Please return to the emergency department if you develop worsening symptoms, severe pain, difficulty breathing, or anything else concerning to you.

## 2023-10-11 NOTE — ED PROVIDER NOTES
History  Chief Complaint   Patient presents with    Cough     Non productive Cough x1 week. Throat burning, hurts. Scratchy. Pcp gave her azithromycin but "its not working"       36-year-old female with history of asthma, depression, diabetes, hypertension, hyperlipidemia who presents for evaluation of cough, shortness of breath, sore throat. Patient reports that her symptoms have been ongoing for the past 2 to 3 weeks. She states that they started after taking a supplement she bought off [email or web address]. She was evaluated here previously with an unremarkable work-up. She states she was placed on azithromycin at that time which has not been helping her symptoms. She has had continued shortness of breath that has been slightly worsening. She now has a nonproductive cough as well. She has not had any fevers that she knows of. She has burning pain in her throat and along the back of her tongue. She otherwise denies nausea, vomiting, abdominal pain, chest pain. Prior to Admission Medications   Prescriptions Last Dose Informant Patient Reported? Taking?    ALPRAZolam (XANAX) 0.5 mg tablet   Yes No   Sig: Take 0.5 mg by mouth daily as needed   Patient not taking: Reported on 8/28/2023   ALPRAZolam [place]) 2 MG tablet   Yes No   Patient not taking: Reported on 8/28/2023   Aspirin Low Dose 81 MG chewable tablet   Yes No   Sig: Chew 81 mg daily Chew   Patient not taking: Reported on 8/28/2023   BD Pen Needle Maile 2nd Gen 32G X 4 MM MISC   Yes No   Sig: Use as directed   Patient not taking: Reported on 8/28/2023   Cholecalciferol (Vitamin D3) 50 MCG (2000 UT) TABS   Yes No   Sig: Take 2,000 Units by mouth daily   Lantus SoloStar 100 units/mL injection pen   Yes No   Multiple Vitamins-Minerals (ONE-A-DAY FOR HER VITACRAVES PO)   Yes No   Sig: Take by mouth   PARoxetine (PAXIL) 30 mg tablet   Yes No   Sig: Take 30 mg by mouth daily   Patient not taking: Reported on 8/28/2023   QUEtiapine (SEROquel) 300 mg tablet   Yes No   Sig: Take 400 mg by mouth daily at bedtime   Trulicity 0.59 BZ/0.1JA injection   Yes No   Sig: INJECT 0.75 MG SUBCUTANEOUSLY ONE TIME PER WEEK   Patient not taking: Reported on 2023   albuterol (PROVENTIL HFA,VENTOLIN HFA) 90 mcg/act inhaler   Yes No   Sig: Inhale 2 puffs every 4 - 6 hours as needed   clotrimazole-betamethasone (LOTRISONE) 1-0.05 % cream   No No   Sig: Apply topically 2 (two) times a day To affected vulvar areas for itching externally only. Patient not taking: Reported on 2023   fluticasone (FLONASE) 50 mcg/act nasal spray   No No   Si spray into each nostril daily   gabapentin (NEURONTIN) 300 mg capsule   Yes No   Sig: Take 300 mg by mouth 2 (two) times a day as needed   Patient not taking: Reported on 2023   lisinopril (ZESTRIL) 2.5 mg tablet   Yes No   Sig: Take 2.5 mg by mouth daily   metFORMIN (GLUCOPHAGE) 1000 MG tablet   Yes No   Sig: Take 1,000 mg by mouth every morning   nicotine (NICODERM CQ) 21 mg/24 hr TD 24 hr patch   Yes No   ramipril (ALTACE) 2.5 mg capsule   Yes No   Sig: Take 2.5 mg by mouth daily   rosuvastatin (CRESTOR) 10 MG tablet   Yes No   Sig: Take 10 mg by mouth daily   Patient not taking: Reported on 2023   rosuvastatin (CRESTOR) 20 MG tablet   Yes No      Facility-Administered Medications: None       Past Medical History:   Diagnosis Date    Asthma     Depression     Diabetes mellitus ([address])     type 2    Hyperlipidemia     Hypertension        Past Surgical History:   Procedure Laterality Date    KNEE ARTHROCENTESIS      MOUTH SURGERY         Family History   Problem Relation Age of Onset    Diabetes Mother     Diabetes Father     Ovarian cancer Sister     Colon cancer Brother      I have reviewed and agree with the history as documented.     E-Cigarette/Vaping    E-Cigarette Use Never User      E-Cigarette/Vaping Substances    THC No     CBD No      Social History     Tobacco Use    Smoking status: Every Day     Packs/day: 1.00     Years: 15.00     Total pack years: 15.00     Types: Cigarettes    Smokeless tobacco: Never   Vaping Use    Vaping Use: Never used   Substance Use Topics    Alcohol use: Not Currently    Drug use: Never       Review of Systems   Constitutional:  Negative for chills and fever. HENT:  Positive for sore throat. Negative for congestion. Respiratory:  Positive for cough and shortness of breath. Cardiovascular:  Negative for chest pain. Gastrointestinal:  Negative for abdominal pain, diarrhea, nausea and vomiting. Genitourinary:  Negative for dysuria, flank pain and frequency. Musculoskeletal:  Negative for gait problem. Skin:  Negative for rash. Neurological:  Negative for weakness and light-headedness. All other systems reviewed and are negative. Physical Exam  Physical Exam  Vitals reviewed. Constitutional:       General: She is not in acute distress. Appearance: She is not ill-appearing. HENT:      Head: Normocephalic and atraumatic. Nose: Nose normal.      Mouth/Throat:      Mouth: Mucous membranes are moist.      Comments: Mild pharyngeal erythema without exudate. No tonsillar swelling. No peritonsillar abscess. Uvula midline. Inflamed papillae noted at the back of the tongue. No other oral lesions. Eyes:      Conjunctiva/sclera: Conjunctivae normal.   Cardiovascular:      Rate and Rhythm: Normal rate and regular rhythm. Heart sounds: No murmur heard. Pulmonary:      Effort: Pulmonary effort is normal.      Breath sounds: Normal breath sounds. No wheezing, rhonchi or rales. Abdominal:      General: There is no distension. Palpations: Abdomen is soft. Tenderness: There is no abdominal tenderness. Musculoskeletal:         General: No swelling or tenderness. Normal range of motion. Cervical back: Normal range of motion and neck supple. Skin:     General: Skin is warm and dry. Findings: No rash. Neurological:      General: No focal deficit present. Mental Status: She is oriented to person, place, and time. Vital Signs  ED Triage Vitals [10/11/23 1008]   Temperature Pulse Respirations Blood Pressure SpO2   98.1 °F (36.7 °C) 97 16 148/97 98 %      Temp Source Heart Rate Source Patient Position - Orthostatic VS BP Location FiO2 (%)   Oral Monitor Sitting Right arm --      Pain Score       10 - Worst Possible Pain           Vitals:    10/11/23 1008   BP: 148/97   Pulse: 97   Patient Position - Orthostatic VS: Sitting         Visual Acuity      ED Medications  Medications   methylPREDNISolone sodium succinate (Solu-MEDROL) injection 125 mg (125 mg Intravenous Given 10/11/23 1043)   albuterol inhalation solution 5 mg (5 mg Nebulization Given 10/11/23 1048)   ipratropium (ATROVENT) 0.02 % inhalation solution 0.5 mg (0.5 mg Nebulization Given 10/11/23 1048)   diphenhydramine, lidocaine, Al/Mg hydroxide, simethicone (Magic Mouthwash) oral solution 10 mL (10 mL Swish & Spit Given 10/11/23 1047)   benzonatate (TESSALON PERLES) capsule 100 mg (100 mg Oral Given 10/11/23 1221)       Diagnostic Studies  Results Reviewed       Procedure Component Value Units Date/Time    FLU/RSV/COVID - if FLU/RSV clinically relevant [[id number]]  (Normal) Collected: 10/11/23 1035    Lab Status: Final result Specimen: Nares from Nose Updated: 10/11/23 1117     SARS-CoV-2 Negative     INFLUENZA A PCR Negative     INFLUENZA B PCR Negative     RSV PCR Negative    Narrative:      FOR PEDIATRIC PATIENTS - copy/paste COVID Guidelines URL to browser: [email or web address]    SARS-CoV-2 assay is a Nucleic Acid Amplification assay intended for the  qualitative detection of nucleic acid from SARS-CoV-2 in nasopharyngeal  swabs. Results are for the presumptive identification of SARS-CoV-2 RNA.     Positive results are indicative of infection with SARS-CoV-2, the virus  causing COVID-19, but do not rule out bacterial infection or co-infection  with other viruses. Laboratories within the [facility] and its  territories are required to report all positive results to the appropriate  public health authorities. Negative results do not preclude SARS-CoV-2  infection and should not be used as the sole basis for treatment or other  patient management decisions. Negative results must be combined with  clinical observations, patient history, and epidemiological information. This test has not been FDA cleared or approved. This test has been authorized by FDA under an Emergency Use Authorization  (EUA). This test is only authorized for the duration of time the  declaration that circumstances exist justifying the authorization of the  emergency use of an in vitro diagnostic tests for detection of SARS-CoV-2  virus and/or diagnosis of COVID-19 infection under section 564(b)(1) of  the Act, 21 U. S.C. 796RKC-4(E)(4), unless the authorization is terminated  or revoked sooner. The test has been validated but independent review by FDA  and CLIA is pending. Test performed using [employer] GeneXpert: This RT-PCR assay targets N2,  a region unique to SARS-CoV-2. A conserved region in the E-gene was chosen  for pan-Sarbecovirus detection which includes SARS-CoV-2. According to CMS-2020-01-R, this platform meets the definition of high-throughput technology.     HS Troponin 0hr (reflex protocol) [[id number]]  (Normal) Collected: 10/11/23 1038    Lab Status: Final result Specimen: Blood from Arm, Left Updated: 10/11/23 1111     hs TnI 0hr <2 ng/L     B-Type Natriuretic Peptide(BNP) [[id number]]  (Normal) Collected: 10/11/23 1038    Lab Status: Final result Specimen: Blood from Arm, Left Updated: 10/11/23 1110     BNP 14 pg/mL     Comprehensive metabolic panel [[id number]]  (Abnormal) Collected: 10/11/23 1038    Lab Status: Final result Specimen: Blood from Arm, Left Updated: 10/11/23 1107     Sodium 136 mmol/L      Potassium 3.7 mmol/L      Chloride 100 mmol/L CO2 28 mmol/L      ANION GAP 8 mmol/L      BUN 10 mg/dL      Creatinine 0.61 mg/dL      Glucose 328 mg/dL      Calcium 9.5 mg/dL      AST 25 U/L      ALT 71 U/L      Alkaline Phosphatase 112 U/L      Total Protein 7.4 g/dL      Albumin 4.5 g/dL      Total Bilirubin 0.54 mg/dL      eGFR 104 ml/min/1.73sq m     Narrative:      National Kidney Disease Foundation guidelines for Chronic Kidney Disease (CKD):     Stage 1 with normal or high GFR (GFR > 90 mL/min/1.73 square meters)    Stage 2 Mild CKD (GFR = 60-89 mL/min/1.73 square meters)    Stage 3A Moderate CKD (GFR = 45-59 mL/min/1.73 square meters)    Stage 3B Moderate CKD (GFR = 30-44 mL/min/1.73 square meters)    Stage 4 Severe CKD (GFR = 15-29 mL/min/1.73 square meters)    Stage 5 End Stage CKD (GFR <15 mL/min/1.73 square meters)  Note: GFR calculation is accurate only with a steady state creatinine    Strep A PCR [[id number]]  (Normal) Collected: 10/11/23 1035    Lab Status: Final result Specimen: Throat Updated: 10/11/23 1106     STREP A PCR Not Detected    CBC and differential [[id number]]  (Abnormal) Collected: 10/11/23 1038    Lab Status: Final result Specimen: Blood from Arm, Left Updated: 10/11/23 1043     WBC 6.03 Thousand/uL      RBC 5.62 Million/uL      Hemoglobin 14.1 g/dL      Hematocrit 44.5 %      MCV 79 fL      MCH 25.1 pg      MCHC 31.7 g/dL      RDW 13.3 %      MPV 10.7 fL      Platelets 053 Thousands/uL      nRBC 0 /100 WBCs      Neutrophils Relative 69 %      Immat GRANS % 1 %      Lymphocytes Relative 21 %      Monocytes Relative 5 %      Eosinophils Relative 3 %      Basophils Relative 1 %      Neutrophils Absolute 4.23 Thousands/µL      Immature Grans Absolute 0.04 Thousand/uL      Lymphocytes Absolute 1.26 Thousands/µL      Monocytes Absolute 0.29 Thousand/µL      Eosinophils Absolute 0.18 Thousand/µL      Basophils Absolute 0.03 Thousands/µL                    XR chest 1 view portable   ED Interpretation by Chandler Rice MD (10/11 1048)   No acute cardiopulmonary abnormality. Independently interpreted by me. Final Result by Marilynn Carrington MD (10/11 104 [id number])      No acute cardiopulmonary disease. Workstation performed: [id number]                    Procedures  Procedures         ED Course  ED Course as of 10/11/23 1247   Wed Oct 11, 2023   1047 CBC and differential(!)   1107 STREP A PCR: Not Detected   1107 Comprehensive metabolic panel(!)   0557 hs TnI 0hr: <2   1114 BNP: 14   1121 FLU/RSV/COVID - if FLU/RSV clinically relevant                               SBIRT 20yo+      Flowsheet Row Most Recent Value   Initial Alcohol Screen: US AUDIT-C     1. How often do you have a drink containing alcohol? 0 Filed at: 10/11/2023 1010   2. How many drinks containing alcohol do you have on a typical day you are drinking? 0 Filed at: 10/11/2023 1010   3a. Male UNDER 65: How often do you have five or more drinks on one occasion? 0 Filed at: 10/11/2023 1010   3b. FEMALE Any Age, or MALE 65+: How often do you have 4 or more drinks on one occassion? 0 Filed at: 10/11/2023 1010   Audit-C Score 0 Filed at: 10/11/2023 1010   QUE: How many times in the past year have you. .. Used an illegal drug or used a prescription medication for non-medical reasons? Never Filed at: 10/11/2023 1010                      Medical Decision Making  52-year-old female presenting for evaluation of cough, shortness of breath, throat and posterior tongue pain. Differential diagnoses include not limited to ACS, pneumonia, pneumothorax, COPD exacerbation, bronchitis, strep pharyngitis, viral URI. Patient clinically with papillitis noted to the posterior tongue. No other oral lesions. Labs unremarkable. Chest x-ray without any acute pathology noted. EKG nonischemic. Patient treated symptomatically with a DuoNeb and steroids with improvement in her symptoms. Patient placed on course of prednisone and provided with a prescription for an inhaler.   Advised follow-up with primary care physician. Return precautions discussed. Problems Addressed:  Acute cough: acute illness or injury  Shortness of breath: acute illness or injury  Transient lingual papillitis: acute illness or injury    Amount and/or Complexity of Data Reviewed  Labs: ordered. Decision-making details documented in ED Course. Radiology: ordered and independent interpretation performed. ECG/medicine tests: ordered and independent interpretation performed. Decision-making details documented in ED Course. Risk  Prescription drug management. Disposition  Final diagnoses:   Acute cough   Shortness of breath   Transient lingual papillitis     Time reflects when diagnosis was documented in both MDM as applicable and the Disposition within this note       Time User Action Codes Description Comment    10/11/2023 12:11 PM Marisa Leyland Add [R05.1] Acute cough     10/11/2023 12:11 PM Marisa Leyland Add [R06.02] Shortness of breath     10/11/2023 12:11 PM Marisa Leyland Add [H46.00] Papillitis     10/11/2023 12:13 PM Marisa Leyland Add [K14.0] Transient lingual papillitis     10/11/2023 12:13 PM Marisa Leyland Remove [V10.69] Papillitis           ED Disposition       ED Disposition   Discharge    Condition   Stable    Date/Time   Wed Oct 11, 2023 1211    [facility] discharge to home/self care.                    Follow-up Information       Follow up With Specialties Details Why Contact Info    Merry Rachel MD Family Medicine In 2 days  [address]  [phone]              Discharge Medication List as of 10/11/2023 12:14 PM        START taking these medications    Details   benzonatate (TESSALON PERLES) 100 mg capsule Take 1 capsule (100 mg total) by mouth 3 (three) times a day as needed for cough for up to 5 days, Starting Wed 10/11/2023, Until Mon 10/16/2023 at 2359, Normal      diphenhydramine, lidocaine, Al/Mg hydroxide, simethicone (Magic Mouthwash) SUSP Swish and spit 10 mL every 4 (four) hours as needed for mouth pain or discomfort for up to 5 days, Starting Wed 10/11/2023, Until Mon 10/16/2023 at 2359, Normal      predniSONE 20 mg tablet Take 2 tablets (40 mg total) by mouth daily for 5 days, Starting Wed 10/11/2023, Until Mon 10/16/2023, Normal           CONTINUE these medications which have CHANGED    Details   albuterol (ProAir HFA) 90 mcg/act inhaler Inhale 2 puffs every 4 (four) hours as needed for wheezing or shortness of breath, Starting Wed 10/11/2023, Until Fri 11/10/2023 at 2359, Normal           CONTINUE these medications which have NOT CHANGED    Details   !! ALPRAZolam (XANAX) 0.5 mg tablet Take 0.5 mg by mouth daily as needed, Starting Sat 3/13/2021, Historical Med      !! ALPRAZolam [place]) 2 MG tablet Starting Mon 8/21/2023, Historical Med      Aspirin Low Dose 81 MG chewable tablet Chew 81 mg daily Chew, Starting Thu 4/1/2021, Historical Med      BD Pen Needle Maile 2nd Gen 32G X 4 MM MISC Use as directed, Starting Sun 1/22/2023, Historical Med      Cholecalciferol (Vitamin D3) 50 MCG (2000 UT) TABS Take 2,000 Units by mouth daily, Starting Wed 2/24/2021, Historical Med      clotrimazole-betamethasone (LOTRISONE) 1-0.05 % cream Apply topically 2 (two) times a day To affected vulvar areas for itching externally only. , Starting Mon 6/26/2023, Normal      fluticasone (FLONASE) 50 mcg/act nasal spray 1 spray into each nostril daily, Starting Tue 9/26/2023, Normal      gabapentin (NEURONTIN) 300 mg capsule Take 300 mg by mouth 2 (two) times a day as needed, Starting Thu 6/1/2023, Historical Med      Lantus SoloStar 100 units/mL injection pen Historical Med      lisinopril (ZESTRIL) 2.5 mg tablet Take 2.5 mg by mouth daily, Starting Wed 3/24/2021, Historical Med      metFORMIN (GLUCOPHAGE) 1000 MG tablet Take 1,000 mg by mouth every morning, Starting Tue 2/7/2023, Historical Med      Multiple Vitamins-Minerals (ONE-A-DAY FOR HER VITACRAVES PO) Take by mouth, Historical Med      nicotine (NICODERM CQ) 21 mg/24 hr TD 24 hr patch Starting Mon 5/8/2023, Historical Med      PARoxetine (PAXIL) 30 mg tablet Take 30 mg by mouth daily, Starting Sat 2/4/2023, Historical Med      QUEtiapine (SEROquel) 300 mg tablet Take 400 mg by mouth daily at bedtime, Starting Wed 3/8/2023, Historical Med      ramipril (ALTACE) 2.5 mg capsule Take 2.5 mg by mouth daily, Starting Wed 5/17/2023, Historical Med      !! rosuvastatin (CRESTOR) 10 MG tablet Take 10 mg by mouth daily, Starting Thu 4/1/2021, Historical Med      !! rosuvastatin (CRESTOR) 20 MG tablet Starting Sun 6/18/2023, Historical Med      Trulicity 8.25 JV/8.8XD injection INJECT 0.75 MG SUBCUTANEOUSLY ONE TIME PER WEEK, Historical Med       !! - Potential duplicate medications found. Please discuss with provider. No discharge procedures on file.     PDMP Review         Value Time User    PDMP Reviewed  Yes 9/23/2022  9:32 AM Genesis Jones DO            ED Provider  Electronically Signed by             Angeline Dougherty MD  10/11/23 8115

## 2023-10-16 ENCOUNTER — OFFICE VISIT (OUTPATIENT)
Dept: OBGYN CLINIC | Facility: CLINIC | Age: 53
End: 2023-10-16
Payer: COMMERCIAL

## 2023-10-16 VITALS
WEIGHT: 169.2 LBS | DIASTOLIC BLOOD PRESSURE: 82 MMHG | SYSTOLIC BLOOD PRESSURE: 128 MMHG | HEIGHT: 66 IN | BODY MASS INDEX: 27.19 KG/M2

## 2023-10-16 DIAGNOSIS — L03.818 CELLULITIS OF OTHER SPECIFIED SITE: Primary | ICD-10-CM

## 2023-10-16 PROCEDURE — 99213 OFFICE O/P EST LOW 20 MIN: CPT | Performed by: OBSTETRICS & GYNECOLOGY

## 2023-10-16 RX ORDER — SULFAMETHOXAZOLE AND TRIMETHOPRIM 800; 160 MG/1; MG/1
1 TABLET ORAL EVERY 12 HOURS SCHEDULED
Qty: 10 TABLET | Refills: 0 | Status: SHIPPED | OUTPATIENT
Start: 2023-10-16 | End: 2023-10-21

## 2023-10-16 NOTE — PROGRESS NOTES
Assessment/Plan:     Diagnoses and all orders for this visit:    Cellulitis of other specified site  -     sulfamethoxazole-trimethoprim (BACTRIM DS) 800-160 mg per tablet; Take 1 tablet by mouth every 12 (twelve) hours for 5 days           46 YO FEMALE   Cellulitis/abscess right groin area   recurrent bacterial vaginosis  DM  smoker  asthma  bipolar/ depression stable  had laparoscopy sec to intra abd abscess with I&D at age 13 cause infertility  HSV2  Plan   -Cyst drained spontaneously  Heating pad  Bactrim called to the pharmacy  Return to office in 1 week if symptoms not improving      Subjective:      Patient ID: Joshua Brown is a 47 y.o. female. Abscess      Patient seen evaluated present to the office today secondary to abscess in her groin area on the right side  Started last week getting worse patient said abscess was drained spontaneously this morning and lots of pus and fluid came out   Patient having pain and discomfort of the area  Patient was shaving on a daily basis        The following portions of the patient's history were reviewed and updated as appropriate: allergies, current medications, past family history, past medical history, past social history, past surgical history and problem list.    Review of Systems      Objective:      /82 (BP Location: Left arm, Patient Position: Sitting, Cuff Size: Adult)   Ht 5' 6" (1.676 m)   Wt 76.7 kg (169 lb 3.2 oz)   BMI 27.31 kg/m²          Physical Exam  Constitutional:       Appearance: Normal appearance. Abdominal:          Comments: Abscess noted with mid part f draining serous fluid no fluctuant area to drain noted today recommend continue heating pad with antibiotics female hygiene reviewed and discussed with patient avoid shaving the area during active infection   Neurological:      General: No focal deficit present. Mental Status: She is alert and oriented to person, place, and time.    Psychiatric:         Mood and Affect: Mood normal.         Behavior: Behavior normal.

## 2023-10-20 ENCOUNTER — HOSPITAL ENCOUNTER (EMERGENCY)
Facility: HOSPITAL | Age: 53
Discharge: HOME | End: 2023-10-20
Attending: EMERGENCY MEDICINE
Payer: COMMERCIAL

## 2023-10-20 VITALS
TEMPERATURE: 98.6 F | OXYGEN SATURATION: 100 % | SYSTOLIC BLOOD PRESSURE: 173 MMHG | DIASTOLIC BLOOD PRESSURE: 69 MMHG | HEART RATE: 115 BPM

## 2023-10-20 DIAGNOSIS — M79.18 MYALGIA, LOWER LEG: ICD-10-CM

## 2023-10-20 DIAGNOSIS — E11.65 HYPERGLYCEMIA DUE TO DIABETES MELLITUS (HCC): Primary | ICD-10-CM

## 2023-10-20 DIAGNOSIS — H53.8 BLURRY VISION, BILATERAL: ICD-10-CM

## 2023-10-20 LAB
ALBUMIN SERPL BCP-MCNC: 4.4 G/DL (ref 3.5–5)
ALP SERPL-CCNC: 108 U/L (ref 34–104)
ALT SERPL W P-5'-P-CCNC: 63 U/L (ref 7–52)
AMPHETAMINES SERPL QL SCN: NEGATIVE
ANION GAP SERPL CALCULATED.3IONS-SCNC: 9 MMOL/L
APTT PPP: 26 SECONDS (ref 23–37)
AST SERPL W P-5'-P-CCNC: 32 U/L (ref 13–39)
BARBITURATES UR QL: NEGATIVE
BASE EX.OXY STD BLDV CALC-SCNC: 30.6 % (ref 60–80)
BASE EXCESS BLDV CALC-SCNC: -0.7 MMOL/L
BASOPHILS # BLD AUTO: 0.05 THOUSANDS/ÂΜL (ref 0–0.1)
BASOPHILS NFR BLD AUTO: 1 % (ref 0–1)
BENZODIAZ UR QL: NEGATIVE
BETA-HYDROXYBUTYRATE: 0.3 MMOL/L
BILIRUB SERPL-MCNC: 0.41 MG/DL (ref 0.2–1)
BILIRUB UR QL STRIP: NEGATIVE
BUN SERPL-MCNC: 13 MG/DL (ref 5–25)
CALCIUM SERPL-MCNC: 9.7 MG/DL (ref 8.4–10.2)
CARDIAC TROPONIN I PNL SERPL HS: 2 NG/L
CHLORIDE SERPL-SCNC: 99 MMOL/L (ref 96–108)
CK SERPL-CCNC: 112 U/L (ref 26–192)
CLARITY UR: CLEAR
CO2 SERPL-SCNC: 24 MMOL/L (ref 21–32)
COCAINE UR QL: NEGATIVE
COLOR UR: YELLOW
CREAT SERPL-MCNC: 0.81 MG/DL (ref 0.6–1.3)
EOSINOPHIL # BLD AUTO: 0.17 THOUSAND/ÂΜL (ref 0–0.61)
EOSINOPHIL NFR BLD AUTO: 2 % (ref 0–6)
ERYTHROCYTE [DISTWIDTH] IN BLOOD BY AUTOMATED COUNT: 13.4 % (ref 11.6–15.1)
GFR SERPL CREATININE-BSD FRML MDRD: 83 ML/MIN/1.73SQ M
GLUCOSE SERPL-MCNC: 400 MG/DL (ref 65–140)
GLUCOSE SERPL-MCNC: 411 MG/DL (ref 65–140)
GLUCOSE UR STRIP-MCNC: ABNORMAL MG/DL
HCO3 BLDV-SCNC: 24.6 MMOL/L (ref 24–30)
HCT VFR BLD AUTO: 45.2 % (ref 34.8–46.1)
HEMOCCULT STL QL IA: NEGATIVE
HGB BLD-MCNC: 14.6 G/DL (ref 11.5–15.4)
HGB UR QL STRIP.AUTO: NEGATIVE
IMM GRANULOCYTES # BLD AUTO: 0.03 THOUSAND/UL (ref 0–0.2)
IMM GRANULOCYTES NFR BLD AUTO: 0 % (ref 0–2)
INR PPP: 0.87 (ref 0.84–1.19)
KETONES UR STRIP-MCNC: ABNORMAL MG/DL
LEUKOCYTE ESTERASE UR QL STRIP: NEGATIVE
LIPASE SERPL-CCNC: 55 U/L (ref 11–82)
LYMPHOCYTES # BLD AUTO: 1.72 THOUSANDS/ÂΜL (ref 0.6–4.47)
LYMPHOCYTES NFR BLD AUTO: 21 % (ref 14–44)
MCH RBC QN AUTO: 25.6 PG (ref 26.8–34.3)
MCHC RBC AUTO-ENTMCNC: 32.3 G/DL (ref 31.4–37.4)
MCV RBC AUTO: 79 FL (ref 82–98)
MONOCYTES # BLD AUTO: 0.41 THOUSAND/ÂΜL (ref 0.17–1.22)
MONOCYTES NFR BLD AUTO: 5 % (ref 4–12)
NEUTROPHILS # BLD AUTO: 5.82 THOUSANDS/ÂΜL (ref 1.85–7.62)
NEUTS SEG NFR BLD AUTO: 71 % (ref 43–75)
NITRITE UR QL STRIP: NEGATIVE
NRBC BLD AUTO-RTO: 0 /100 WBCS
O2 CT BLDV-SCNC: 6.1 ML/DL
OPIATES UR QL SCN: NEGATIVE
OXYCODONE+OXYMORPHONE UR QL SCN: NEGATIVE
PCO2 BLDV: 43 MM HG (ref 42–50)
PCP UR QL: NEGATIVE
PH BLDV: 7.38 [PH] (ref 7.3–7.4)
PH UR STRIP.AUTO: 5.5 [PH]
PLATELET # BLD AUTO: 211 THOUSANDS/UL (ref 149–390)
PMV BLD AUTO: 10.9 FL (ref 8.9–12.7)
PO2 BLDV: 17.9 MM HG (ref 35–45)
POTASSIUM SERPL-SCNC: 4 MMOL/L (ref 3.5–5.3)
PROT SERPL-MCNC: 7.2 G/DL (ref 6.4–8.4)
PROT UR STRIP-MCNC: NEGATIVE MG/DL
PROTHROMBIN TIME: 12.1 SECONDS (ref 11.6–14.5)
RBC # BLD AUTO: 5.7 MILLION/UL (ref 3.81–5.12)
SODIUM SERPL-SCNC: 132 MMOL/L (ref 135–147)
SP GR UR STRIP.AUTO: 1.01 (ref 1–1.03)
THC UR QL: NEGATIVE
UROBILINOGEN UR QL STRIP.AUTO: 0.2 E.U./DL
WBC # BLD AUTO: 8.2 THOUSAND/UL (ref 4.31–10.16)

## 2023-10-20 PROCEDURE — 85025 COMPLETE CBC W/AUTO DIFF WBC: CPT | Performed by: PHYSICIAN ASSISTANT

## 2023-10-20 PROCEDURE — 85610 PROTHROMBIN TIME: CPT | Performed by: PHYSICIAN ASSISTANT

## 2023-10-20 PROCEDURE — 82805 BLOOD GASES W/O2 SATURATION: CPT | Performed by: PHYSICIAN ASSISTANT

## 2023-10-20 PROCEDURE — 99285 EMERGENCY DEPT VISIT HI MDM: CPT | Performed by: EMERGENCY MEDICINE

## 2023-10-20 PROCEDURE — 80053 COMPREHEN METABOLIC PANEL: CPT | Performed by: PHYSICIAN ASSISTANT

## 2023-10-20 PROCEDURE — 83690 ASSAY OF LIPASE: CPT | Performed by: PHYSICIAN ASSISTANT

## 2023-10-20 PROCEDURE — 85730 THROMBOPLASTIN TIME PARTIAL: CPT | Performed by: PHYSICIAN ASSISTANT

## 2023-10-20 PROCEDURE — 99284 EMERGENCY DEPT VISIT MOD MDM: CPT

## 2023-10-20 PROCEDURE — 80307 DRUG TEST PRSMV CHEM ANLYZR: CPT | Performed by: PHYSICIAN ASSISTANT

## 2023-10-20 PROCEDURE — 84484 ASSAY OF TROPONIN QUANT: CPT | Performed by: PHYSICIAN ASSISTANT

## 2023-10-20 PROCEDURE — 82550 ASSAY OF CK (CPK): CPT | Performed by: PHYSICIAN ASSISTANT

## 2023-10-20 PROCEDURE — 82010 KETONE BODYS QUAN: CPT | Performed by: PHYSICIAN ASSISTANT

## 2023-10-20 PROCEDURE — 81003 URINALYSIS AUTO W/O SCOPE: CPT | Performed by: PHYSICIAN ASSISTANT

## 2023-10-20 PROCEDURE — G0328 FECAL BLOOD SCRN IMMUNOASSAY: HCPCS | Performed by: PHYSICIAN ASSISTANT

## 2023-10-20 PROCEDURE — 82948 REAGENT STRIP/BLOOD GLUCOSE: CPT

## 2023-10-20 PROCEDURE — 93005 ELECTROCARDIOGRAM TRACING: CPT

## 2023-10-20 PROCEDURE — 36415 COLL VENOUS BLD VENIPUNCTURE: CPT | Performed by: PHYSICIAN ASSISTANT

## 2023-10-20 PROCEDURE — 96372 THER/PROPH/DIAG INJ SC/IM: CPT

## 2023-10-20 RX ADMIN — INSULIN HUMAN 6 UNITS: 100 INJECTION, SOLUTION PARENTERAL at 19:59

## 2023-10-20 NOTE — ED PROVIDER NOTES
History  Chief Complaint   Patient presents with    Blurred Vision     Patient arrives to the Ed with complain of blurry vision for the past three days, patient states she began taking medication that was prescribed for her here for "inflammation" states her vision is very blurry. In addition patient is also complaining of leg pain, she states the pain did not go away until she took her metformin. 56-year-old female presents with few days of blurry vision, reports that she believes her blurry vision may be in relation to her blood sugars, as she has been unable to take her diabetes medications for a few days. She reports she also has been having some leg cramps, she recently was seen for bronchitis, and was given steroids and antitussives, also reports she has had some intra-abdominal inflammation that was seen on previous blood work, and reports that she recently has been having black stools as well. The patient denies any recent trauma, and reports she has had no significant headache, dizziness, chest pain, shortness of breath, abdominal pain, dysuria, hematuria, or other complaints. Prior to Admission Medications   Prescriptions Last Dose Informant Patient Reported? Taking?    ALPRAZolam (XANAX) 0.5 mg tablet   Yes No   Sig: Take 0.5 mg by mouth daily as needed   ALPRAZolam (XANAX) 2 MG tablet   Yes No   Aspirin Low Dose 81 MG chewable tablet   Yes No   Sig: Chew 81 mg daily Chew   BD Pen Needle Maile 2nd Gen 32G X 4 MM MISC   Yes No   Sig: Use as directed   Cholecalciferol (Vitamin D3) 50 MCG (2000 UT) TABS   Yes No   Sig: Take 2,000 Units by mouth daily   Lantus SoloStar 100 units/mL injection pen   Yes No   Multiple Vitamins-Minerals (ONE-A-DAY FOR HER VITACRAVES PO)   Yes No   Sig: Take by mouth   PARoxetine (PAXIL) 30 mg tablet   Yes No   Sig: Take 30 mg by mouth daily   QUEtiapine (SEROquel) 300 mg tablet   Yes No   Sig: Take 400 mg by mouth daily at bedtime   Trulicity 0.53 VV/9.3RJ injection Yes No   albuterol (ProAir HFA) 90 mcg/act inhaler   No No   Sig: Inhale 2 puffs every 4 (four) hours as needed for wheezing or shortness of breath   clotrimazole-betamethasone (LOTRISONE) 1-0.05 % cream   No No   Sig: Apply topically 2 (two) times a day To affected vulvar areas for itching externally only. fluticasone (FLONASE) 50 mcg/act nasal spray   No No   Si spray into each nostril daily   gabapentin (NEURONTIN) 300 mg capsule   Yes No   Sig: Take 300 mg by mouth 2 (two) times a day as needed   lisinopril (ZESTRIL) 2.5 mg tablet   Yes No   Sig: Take 2.5 mg by mouth daily   metFORMIN (GLUCOPHAGE) 1000 MG tablet   Yes No   Sig: Take 1,000 mg by mouth every morning   nicotine (NICODERM CQ) 21 mg/24 hr TD 24 hr patch   Yes No   ramipril (ALTACE) 2.5 mg capsule   Yes No   Sig: Take 2.5 mg by mouth daily   rosuvastatin (CRESTOR) 10 MG tablet   Yes No   Sig: Take 10 mg by mouth daily   rosuvastatin (CRESTOR) 20 MG tablet   Yes No   sulfamethoxazole-trimethoprim (BACTRIM DS) 800-160 mg per tablet   No No   Sig: Take 1 tablet by mouth every 12 (twelve) hours for 5 days      Facility-Administered Medications: None       Past Medical History:   Diagnosis Date    Asthma     Depression     Diabetes mellitus ([address])     type 2    Hyperlipidemia     Hypertension        Past Surgical History:   Procedure Laterality Date    KNEE ARTHROCENTESIS      MOUTH SURGERY         Family History   Problem Relation Age of Onset    Diabetes Mother     Diabetes Father     Ovarian cancer Sister     Colon cancer Brother      I have reviewed and agree with the history as documented.     E-Cigarette/Vaping    E-Cigarette Use Never User      E-Cigarette/Vaping Substances    THC No     CBD No      Social History     Tobacco Use    Smoking status: Every Day     Packs/day: 1.00     Years: 35.00     Total pack years: 35.00     Types: Cigarettes    Smokeless tobacco: Never   Vaping Use    Vaping Use: Never used   Substance Use Topics    Alcohol use: Not Currently    Drug use: Never       Review of Systems   Constitutional:  Negative for fever. HENT:  Negative for congestion. Eyes:  Negative for visual disturbance. Respiratory:  Positive for cough. Negative for shortness of breath. Cardiovascular:  Negative for chest pain. Gastrointestinal:  Positive for abdominal pain. Negative for constipation, diarrhea, nausea and vomiting. Endocrine: Negative for polyuria. Genitourinary:  Negative for dysuria and hematuria. Musculoskeletal:  Negative for myalgias. Neurological:  Negative for dizziness and headaches. Physical Exam  Physical Exam  Vitals and nursing note reviewed. Constitutional:       General: She is not in acute distress. Appearance: Normal appearance. She is well-developed. HENT:      Head: Normocephalic and atraumatic. Mouth/Throat:      Mouth: Mucous membranes are moist.   Eyes:      Extraocular Movements: Extraocular movements intact. Conjunctiva/sclera: Conjunctivae normal.      Pupils: Pupils are equal, round, and reactive to light. Cardiovascular:      Rate and Rhythm: Normal rate and regular rhythm. Comments: Despite vital signs showing tachycardia on my exam she is at a normal rate. Pulmonary:      Effort: Pulmonary effort is normal. No respiratory distress. Breath sounds: Normal breath sounds. Abdominal:      General: There is no distension. Palpations: Abdomen is soft. Tenderness: There is no abdominal tenderness. There is no right CVA tenderness or left CVA tenderness. Comments: No tenderness, guarding, masses, or any other abnormalities noted on abdominal examination   Genitourinary:     Comments: Rectal exam shows brown stool  Musculoskeletal:         General: No swelling. Cervical back: Neck supple. Skin:     General: Skin is warm and dry. Capillary Refill: Capillary refill takes less than 2 seconds.    Neurological:      General: No focal deficit present. Mental Status: She is alert and oriented to person, place, and time. Psychiatric:         Mood and Affect: Mood normal.         Vital Signs  ED Triage Vitals [10/20/23 1703]   Temperature Pulse Resp Blood Pressure SpO2   98.6 °F (37 °C) (!) 115 -- (!) 173/69 100 %      Temp Source Heart Rate Source Patient Position - Orthostatic VS BP Location FiO2 (%)   Oral Monitor Lying Left arm --      Pain Score       --           Vitals:    10/20/23 1703   BP: (!) 173/69   Pulse: (!) 115   Patient Position - Orthostatic VS: Lying         Visual Acuity      ED Medications  Medications   insulin regular (HumuLIN R,NovoLIN R) injection 6 Units (6 Units Subcutaneous Given 10/20/23 1959)       Diagnostic Studies  Results Reviewed       Procedure Component Value Units Date/Time    Rapid drug screen, urine [[id number]] Collected: 10/20/23 1807    Lab Status: Final result Specimen: Urine, Clean Catch Updated: 10/20/23 1845     Amph/Meth UR Negative     Barbiturate Ur Negative     Benzodiazepine Urine Negative     Cocaine Urine Negative     Methadone Urine --     Opiate Urine Negative     PCP Ur Negative     THC Urine Negative     Oxycodone Urine Negative    Narrative:      No methadone test performed; if required call laboratory  FOR MEDICAL PURPOSES ONLY. IF CONFIRMATION NEEDED PLEASE CONTACT THE LAB WITHIN 5 DAYS.     Drug Screen Cutoff Levels:  AMPHETAMINE/METHAMPHETAMINES  1000 ng/mL  BARBITURATES     200 ng/mL  BENZODIAZEPINES     200 ng/mL  COCAINE      300 ng/mL  METHADONE      300 ng/mL  OPIATES      300 ng/mL  PHENCYCLIDINE     25 ng/mL  THC       50 ng/mL  OXYCODONE      100 ng/mL    UA w Reflex to Microscopic w Reflex to Culture [[id number]]  (Abnormal) Collected: 10/20/23 1806    Lab Status: Final result Specimen: Urine, Clean Catch Updated: 10/20/23 1827     Color, UA Yellow     Clarity, UA Clear     Specific Gravity, UA 1.010     pH, UA 5.5     Leukocytes, UA Negative     Nitrite, UA Negative     Protein, UA Negative mg/dl      Glucose, UA 3+ mg/dl      Ketones, UA Trace mg/dl      Urobilinogen, UA 0.2 E.U./dl      Bilirubin, UA Negative     Occult Blood, UA Negative    HS Troponin 0hr (reflex protocol) [[id number]]  (Normal) Collected: 10/20/23 1752    Lab Status: Final result Specimen: Blood from Arm, Left Updated: 10/20/23 1824     hs TnI 0hr 2 ng/L     Comprehensive metabolic panel [[id number]]  (Abnormal) Collected: 10/20/23 1752    Lab Status: Final result Specimen: Blood from Arm, Left Updated: 10/20/23 1820     Sodium 132 mmol/L      Potassium 4.0 mmol/L      Chloride 99 mmol/L      CO2 24 mmol/L      ANION GAP 9 mmol/L      BUN 13 mg/dL      Creatinine 0.81 mg/dL      Glucose 411 mg/dL      Calcium 9.7 mg/dL      AST 32 U/L      ALT 63 U/L      Alkaline Phosphatase 108 U/L      Total Protein 7.2 g/dL      Albumin 4.4 g/dL      Total Bilirubin 0.41 mg/dL      eGFR 83 ml/min/1.73sq m     Narrative:      [place] guidelines for Chronic Kidney Disease (CKD):     Stage 1 with normal or high GFR (GFR > 90 mL/min/1.73 square meters)    Stage 2 Mild CKD (GFR = 60-89 mL/min/1.73 square meters)    Stage 3A Moderate CKD (GFR = 45-59 mL/min/1.73 square meters)    Stage 3B Moderate CKD (GFR = 30-44 mL/min/1.73 square meters)    Stage 4 Severe CKD (GFR = 15-29 mL/min/1.73 square meters)    Stage 5 End Stage CKD (GFR <15 mL/min/1.73 square meters)  Note: GFR calculation is accurate only with a steady state creatinine    Lipase [[id number]]  (Normal) Collected: 10/20/23 1752    Lab Status: Final result Specimen: Blood from Arm, Left Updated: 10/20/23 1820     Lipase 55 u/L     CK [[id number]]  (Normal) Collected: 10/20/23 1752    Lab Status: Final result Specimen: Blood from Arm, Left Updated: 10/20/23 1820     Total  U/L     Blood gas, venous [[id number]]  (Abnormal) Collected: 10/20/23 1809    Lab Status: Final result Specimen: Blood from Arm, Left Updated: 10/20/23 1819     pH, Emery 7.375 pCO2, Emery 43.0 mm Hg      pO2, Emery 17.9 mm Hg      HCO3, Emery 24.6 mmol/L      Base Excess, Emery -0.7 mmol/L      O2 Content, Emery 6.1 ml/dL      O2 HGB, VENOUS 30.6 %     Occult Blood, Fecal Immunochemical [[id number]]  (Normal) Collected: 10/20/23 1752    Lab Status: Final result Specimen: Stool from Per Rectum Updated: 10/20/23 1815     OCCULT BLD, FECAL IMMUNOLOGICAL Negative    Narrative:        Performed by Fecal Immunochemical Test.    Protime-INR [[id number]]  (Normal) Collected: 10/20/23 1752    Lab Status: Final result Specimen: Blood from Arm, Left Updated: 10/20/23 1814     Protime 12.1 seconds      INR 0.87    APTT [[id number]]  (Normal) Collected: 10/20/23 1752    Lab Status: Final result Specimen: Blood from Arm, Left Updated: 10/20/23 1814     PTT 26 seconds     Beta Hydroxybutyrate [[id number]]  (Normal) Collected: 10/20/23 1752    Lab Status: Final result Specimen: Blood from Arm, Left Updated: 10/20/23 1804     BETA-HYDROXYBUTYRATE 0.3 mmol/L     CBC and differential [[id number]]  (Abnormal) Collected: 10/20/23 1752    Lab Status: Final result Specimen: Blood from Arm, Left Updated: 10/20/23 1801     WBC 8.20 Thousand/uL      RBC 5.70 Million/uL      Hemoglobin 14.6 g/dL      Hematocrit 45.2 %      MCV 79 fL      MCH 25.6 pg      MCHC 32.3 g/dL      RDW 13.4 %      MPV 10.9 fL      Platelets 102 Thousands/uL      nRBC 0 /100 WBCs      Neutrophils Relative 71 %      Immat GRANS % 0 %      Lymphocytes Relative 21 %      Monocytes Relative 5 %      Eosinophils Relative 2 %      Basophils Relative 1 %      Neutrophils Absolute 5.82 Thousands/µL      Immature Grans Absolute 0.03 Thousand/uL      Lymphocytes Absolute 1.72 Thousands/µL      Monocytes Absolute 0.41 Thousand/µL      Eosinophils Absolute 0.17 Thousand/µL      Basophils Absolute 0.05 Thousands/µL     Fingerstick Glucose (POCT) [[id number]]  (Abnormal) Collected: 10/20/23 1748    Lab Status: Final result Updated: 10/20/23 1751     POC Glucose 400 mg/dl                    No orders to display              Procedures  ECG 12 Lead Documentation Only    Date/Time: 10/20/2023 6:08 PM    Performed by: Antonio Pelaez MD  Authorized by: Antonio Pelaez MD    ECG reviewed by me, the ED Provider: yes    Patient location:  ED  Previous ECG:     Previous ECG:  Compared to current    Similarity:  No change  Interpretation:     Interpretation: normal    Rate:     ECG rate:  97    ECG rate assessment: normal    Rhythm:     Rhythm: sinus rhythm    Ectopy:     Ectopy: none    QRS:     QRS axis:  Normal    QRS intervals:  Normal  Conduction:     Conduction: normal    ST segments:     ST segments:  Normal  T waves:     T waves: normal             ED Course       Medical Decision Making  The patient will be evaluated for possible ACS, arrhythmia, hypoglycemia, urinary tract infection, but as the patient has no abdominal pain or other abnormalities seen in her abdomen vomiting lamination, my suspicion for intra-abdominal pathology is low. Her blood pressure is slightly elevated as well as her fingerstick glucose being high, and if no other pathology is found this is likely the cause of her blurry vision. The patient's work-up is otherwise unremarkable, found to have no anion gap and no decreased pH or other signs of DKA, so she will be given some subcutaneous insulin for hyperglycemia, and will be discharged home and will be encouraged to follow-up with endocrinology and primary care as an outpatient to further manage her blood glucose and hypertension. Amount and/or Complexity of Data Reviewed  Labs: ordered. Risk  OTC drugs.              Disposition  Final diagnoses:   Hyperglycemia due to diabetes mellitus ([address])   Blurry vision, bilateral   Myalgia, lower leg     Time reflects when diagnosis was documented in both MDM as applicable and the Disposition within this note       Time User Action Codes Description Comment    10/20/2023  7:40 PM Savanah Burnett Maddie Borja [E11.65] Hyperglycemia due to diabetes mellitus ([address])     10/20/2023  7:40 PM Jigna Mcdermotts Add [H53.8] Blurry vision, bilateral     10/20/2023  7:40 PM Jigna Mcconnell Add [A80.17] Myalgia, lower leg           ED Disposition       ED Disposition   Discharge    Condition   Stable    Date/Time   Fri Oct 20, 2023  7:39 PM    Comment   Ace Ping discharge to home/self care.                    Follow-up Information       Follow up With Specialties Details Why Contact Info Additional Information    Amina Hdz MD Family Medicine Schedule an appointment as soon as possible for a visit  For follow-up [facility] [phone] [address] Emergency Department Emergency Medicine Go to  As needed [address] [place] [id number]  [address] [place] [address] Emergency Department, [address] [place] [address] Dr, [address] [facility] [place] [facility] Schedule an appointment as soon as possible for a visit in 3 days For follow-up [address] [facility] [address]  [address] [place] [address] for Diabetes and Endocrinology Mauricio Grimm [address] [facility], [place], [address], [phone]            Discharge Medication List as of 10/20/2023  7:42 PM        CONTINUE these medications which have NOT CHANGED    Details   albuterol (ProAir HFA) 90 mcg/act inhaler Inhale 2 puffs every 4 (four) hours as needed for wheezing or shortness of breath, Starting Wed 10/11/2023, Until Fri 11/10/2023 at 2359, Normal      !! ALPRAZolam (XANAX) 0.5 mg tablet Take 0.5 mg by mouth daily as needed, Starting Sat 3/13/2021, Historical Med      !! ALPRAZolam Hyun Bills) 2 MG tablet Starting Mon 8/21/2023, Historical Med      Aspirin Low Dose 81 MG chewable tablet Chew 81 mg daily Chew, Starting Thu 4/1/2021, Historical Med      BD Pen Needle Maile 2nd Gen 32G X 4 MM MISC Use as directed, Starting Sun 1/22/2023, Historical Med      Cholecalciferol (Vitamin D3) 50 MCG (2000 UT) TABS Take 2,000 Units by mouth daily, Starting Wed 2/24/2021, Historical Med      clotrimazole-betamethasone (LOTRISONE) 1-0.05 % cream Apply topically 2 (two) times a day To affected vulvar areas for itching externally only. , Starting Mon 6/26/2023, Normal      fluticasone (FLONASE) 50 mcg/act nasal spray 1 spray into each nostril daily, Starting Tue 9/26/2023, Normal      gabapentin (NEURONTIN) 300 mg capsule Take 300 mg by mouth 2 (two) times a day as needed, Starting Thu 6/1/2023, Historical Med      Lantus SoloStar 100 units/mL injection pen Historical Med      lisinopril (ZESTRIL) 2.5 mg tablet Take 2.5 mg by mouth daily, Starting Wed 3/24/2021, Historical Med      metFORMIN (GLUCOPHAGE) 1000 MG tablet Take 1,000 mg by mouth every morning, Starting Tue 2/7/2023, Historical Med      Multiple Vitamins-Minerals (ONE-A-DAY FOR HER VITACRAVES PO) Take by mouth, Historical Med      nicotine (NICODERM CQ) 21 mg/24 hr TD 24 hr patch Starting Mon 5/8/2023, Historical Med      PARoxetine (PAXIL) 30 mg tablet Take 30 mg by mouth daily, Starting Sat 2/4/2023, Historical Med      QUEtiapine (SEROquel) 300 mg tablet Take 400 mg by mouth daily at bedtime, Starting Wed 3/8/2023, Historical Med      ramipril (ALTACE) 2.5 mg capsule Take 2.5 mg by mouth daily, Starting Wed 5/17/2023, Historical Med      !! rosuvastatin (CRESTOR) 10 MG tablet Take 10 mg by mouth daily, Starting Thu 4/1/2021, Historical Med      !! rosuvastatin (CRESTOR) 20 MG tablet Starting Sun 6/18/2023, Historical Med      sulfamethoxazole-trimethoprim (BACTRIM DS) 800-160 mg per tablet Take 1 tablet by mouth every 12 (twelve) hours for 5 days, Starting Mon 10/16/2023, Until Sat 10/21/2023, Normal      Trulicity 2.57 FU/4.1HD injection Historical Med       !! - Potential duplicate medications found. Please discuss with provider.           No discharge procedures on file.    PDMP Review         Value Time User    PDMP Reviewed  Yes 9/23/2022  9:32 AM Tamera Hook DO            ED Provider  Electronically Signed by             Tete Armstrong MD  10/21/23 0954

## 2023-10-21 LAB
ATRIAL RATE: 97 BPM
P AXIS: 67 DEGREES
PR INTERVAL: 130 MS
QRS AXIS: 51 DEGREES
QRSD INTERVAL: 78 MS
QT INTERVAL: 356 MS
QTC INTERVAL: 452 MS
T WAVE AXIS: 55 DEGREES
VENTRICULAR RATE: 97 BPM

## 2023-10-21 PROCEDURE — 93010 ELECTROCARDIOGRAM REPORT: CPT | Performed by: INTERNAL MEDICINE

## 2023-11-03 ENCOUNTER — TELEPHONE (OUTPATIENT)
Dept: ENDOCRINOLOGY | Facility: CLINIC | Age: 53
End: 2023-11-03

## 2023-11-03 NOTE — TELEPHONE ENCOUNTER
Spoke with insurance co and pt is eligible but our provider at [facility] office is not in [employer]. Tried calling pt to let her know and msg stated, not accepting calls at this time.     Ref #[id number]

## 2023-11-06 ENCOUNTER — TELEPHONE (OUTPATIENT)
Dept: OBGYN CLINIC | Facility: CLINIC | Age: 53
End: 2023-11-06

## 2023-11-06 NOTE — TELEPHONE ENCOUNTER
Patient has vaginal itching, and has rubbed herself raw, can you call in medication to her pharmacy for her ?

## 2023-11-06 NOTE — TELEPHONE ENCOUNTER
I tried to call the patient for details about her symptom phone number is not working  please call the patient or send her message I like her phone number and I would like details about her symptoms to be able to call the night medication to her pharmacy she would like to schedule appointment please give her appointment

## 2023-11-10 DIAGNOSIS — N89.8 VAGINAL ITCHING: Primary | ICD-10-CM

## 2023-11-10 DIAGNOSIS — N89.8 VAGINAL ODOR: ICD-10-CM

## 2023-11-10 RX ORDER — METRONIDAZOLE 7.5 MG/G
1 GEL VAGINAL
Qty: 70 G | Refills: 0 | Status: SHIPPED | OUTPATIENT
Start: 2023-11-10 | End: 2023-11-15

## 2023-11-10 NOTE — TELEPHONE ENCOUNTER
If patient states there is an order with the itching, could be BV. I have sent in metronidazole vaginal gel suppository antibiotic to her pharmacy we have on file. She will insert 1 applicatorful of the antibiotic gel into the vagina before bed for 5 nights. If symptoms do not resolve with this treatment I would recommend an office evaluation and testing.   Thank you

## 2023-11-10 NOTE — TELEPHONE ENCOUNTER
Patient c/o vaginal irritation, external itching and itching at opening of vaginal and slight "fishy" odor. Patient denies any discharge. Patient states tried vagisil itching cream OTC but no relief. Patient would like RX sent to pharmacy?   Patient states does not have working phone number at the moment

## 2023-11-17 ENCOUNTER — APPOINTMENT (EMERGENCY)
Dept: CT IMAGING | Facility: HOSPITAL | Age: 53
End: 2023-11-17
Payer: COMMERCIAL

## 2023-11-17 ENCOUNTER — HOSPITAL ENCOUNTER (EMERGENCY)
Facility: HOSPITAL | Age: 53
Discharge: HOME | End: 2023-11-17
Attending: EMERGENCY MEDICINE
Payer: COMMERCIAL

## 2023-11-17 VITALS
DIASTOLIC BLOOD PRESSURE: 86 MMHG | HEART RATE: 103 BPM | SYSTOLIC BLOOD PRESSURE: 148 MMHG | OXYGEN SATURATION: 100 % | TEMPERATURE: 98 F | RESPIRATION RATE: 16 BRPM

## 2023-11-17 DIAGNOSIS — Z86.39 HISTORY OF DIABETES MELLITUS: ICD-10-CM

## 2023-11-17 DIAGNOSIS — Z76.0 MEDICATION REFILL: ICD-10-CM

## 2023-11-17 DIAGNOSIS — H53.8 BLURRED VISION: Primary | ICD-10-CM

## 2023-11-17 LAB
ALBUMIN SERPL BCP-MCNC: 4.4 G/DL (ref 3.5–5)
ALP SERPL-CCNC: 90 U/L (ref 34–104)
ALT SERPL W P-5'-P-CCNC: 45 U/L (ref 7–52)
ANION GAP SERPL CALCULATED.3IONS-SCNC: 8 MMOL/L
AST SERPL W P-5'-P-CCNC: 24 U/L (ref 13–39)
ATRIAL RATE: 87 BPM
BACTERIA UR QL AUTO: NORMAL /HPF
BASOPHILS # BLD AUTO: 0.04 THOUSANDS/ÂΜL (ref 0–0.1)
BASOPHILS NFR BLD AUTO: 1 % (ref 0–1)
BILIRUB SERPL-MCNC: 0.42 MG/DL (ref 0.2–1)
BILIRUB UR QL STRIP: NEGATIVE
BUN SERPL-MCNC: 7 MG/DL (ref 5–25)
CALCIUM SERPL-MCNC: 9.3 MG/DL (ref 8.4–10.2)
CHLORIDE SERPL-SCNC: 108 MMOL/L (ref 96–108)
CLARITY UR: CLEAR
CO2 SERPL-SCNC: 24 MMOL/L (ref 21–32)
COLOR UR: YELLOW
CREAT SERPL-MCNC: 0.6 MG/DL (ref 0.6–1.3)
EOSINOPHIL # BLD AUTO: 0.08 THOUSAND/ÂΜL (ref 0–0.61)
EOSINOPHIL NFR BLD AUTO: 1 % (ref 0–6)
ERYTHROCYTE [DISTWIDTH] IN BLOOD BY AUTOMATED COUNT: 13.5 % (ref 11.6–15.1)
GFR SERPL CREATININE-BSD FRML MDRD: 105 ML/MIN/1.73SQ M
GLUCOSE SERPL-MCNC: 91 MG/DL (ref 65–140)
GLUCOSE SERPL-MCNC: 95 MG/DL (ref 65–140)
GLUCOSE UR STRIP-MCNC: NEGATIVE MG/DL
HCG SERPL QL: NEGATIVE
HCT VFR BLD AUTO: 42.1 % (ref 34.8–46.1)
HGB BLD-MCNC: 13.3 G/DL (ref 11.5–15.4)
HGB UR QL STRIP.AUTO: NEGATIVE
IMM GRANULOCYTES # BLD AUTO: 0.02 THOUSAND/UL (ref 0–0.2)
IMM GRANULOCYTES NFR BLD AUTO: 0 % (ref 0–2)
KETONES UR STRIP-MCNC: NEGATIVE MG/DL
LEUKOCYTE ESTERASE UR QL STRIP: NEGATIVE
LYMPHOCYTES # BLD AUTO: 1.6 THOUSANDS/ÂΜL (ref 0.6–4.47)
LYMPHOCYTES NFR BLD AUTO: 22 % (ref 14–44)
MAGNESIUM SERPL-MCNC: 2 MG/DL (ref 1.9–2.7)
MCH RBC QN AUTO: 25.5 PG (ref 26.8–34.3)
MCHC RBC AUTO-ENTMCNC: 31.6 G/DL (ref 31.4–37.4)
MCV RBC AUTO: 81 FL (ref 82–98)
MONOCYTES # BLD AUTO: 0.37 THOUSAND/ÂΜL (ref 0.17–1.22)
MONOCYTES NFR BLD AUTO: 5 % (ref 4–12)
NEUTROPHILS # BLD AUTO: 5.17 THOUSANDS/ÂΜL (ref 1.85–7.62)
NEUTS SEG NFR BLD AUTO: 71 % (ref 43–75)
NITRITE UR QL STRIP: NEGATIVE
NON-SQ EPI CELLS URNS QL MICRO: NORMAL /HPF
NRBC BLD AUTO-RTO: 0 /100 WBCS
P AXIS: 64 DEGREES
PH UR STRIP.AUTO: 6 [PH]
PLATELET # BLD AUTO: 194 THOUSANDS/UL (ref 149–390)
PMV BLD AUTO: 10.4 FL (ref 8.9–12.7)
POTASSIUM SERPL-SCNC: 3.5 MMOL/L (ref 3.5–5.3)
PR INTERVAL: 140 MS
PROT SERPL-MCNC: 6.9 G/DL (ref 6.4–8.4)
PROT UR STRIP-MCNC: ABNORMAL MG/DL
QRS AXIS: 54 DEGREES
QRSD INTERVAL: 82 MS
QT INTERVAL: 390 MS
QTC INTERVAL: 469 MS
RBC # BLD AUTO: 5.21 MILLION/UL (ref 3.81–5.12)
RBC #/AREA URNS AUTO: NORMAL /HPF
SODIUM SERPL-SCNC: 140 MMOL/L (ref 135–147)
SP GR UR STRIP.AUTO: 1.02 (ref 1–1.03)
T WAVE AXIS: 48 DEGREES
UROBILINOGEN UR QL STRIP.AUTO: 0.2 E.U./DL
VENTRICULAR RATE: 87 BPM
WBC # BLD AUTO: 7.28 THOUSAND/UL (ref 4.31–10.16)
WBC #/AREA URNS AUTO: NORMAL /HPF

## 2023-11-17 PROCEDURE — 80053 COMPREHEN METABOLIC PANEL: CPT | Performed by: EMERGENCY MEDICINE

## 2023-11-17 PROCEDURE — 93005 ELECTROCARDIOGRAM TRACING: CPT

## 2023-11-17 PROCEDURE — 36415 COLL VENOUS BLD VENIPUNCTURE: CPT | Performed by: EMERGENCY MEDICINE

## 2023-11-17 PROCEDURE — 83735 ASSAY OF MAGNESIUM: CPT | Performed by: EMERGENCY MEDICINE

## 2023-11-17 PROCEDURE — 82948 REAGENT STRIP/BLOOD GLUCOSE: CPT

## 2023-11-17 PROCEDURE — 99285 EMERGENCY DEPT VISIT HI MDM: CPT

## 2023-11-17 PROCEDURE — 84703 CHORIONIC GONADOTROPIN ASSAY: CPT | Performed by: EMERGENCY MEDICINE

## 2023-11-17 PROCEDURE — 99285 EMERGENCY DEPT VISIT HI MDM: CPT | Performed by: EMERGENCY MEDICINE

## 2023-11-17 PROCEDURE — 81001 URINALYSIS AUTO W/SCOPE: CPT | Performed by: EMERGENCY MEDICINE

## 2023-11-17 PROCEDURE — 93010 ELECTROCARDIOGRAM REPORT: CPT | Performed by: INTERNAL MEDICINE

## 2023-11-17 PROCEDURE — 85025 COMPLETE CBC W/AUTO DIFF WBC: CPT | Performed by: EMERGENCY MEDICINE

## 2023-11-17 PROCEDURE — 70450 CT HEAD/BRAIN W/O DYE: CPT

## 2023-11-17 RX ORDER — PEN NEEDLE, DIABETIC 32GX 5/32"
NEEDLE, DISPOSABLE MISCELLANEOUS DAILY
Qty: 30 EACH | Refills: 0 | Status: SHIPPED | OUTPATIENT
Start: 2023-11-17 | End: 2023-12-17

## 2023-11-17 RX ORDER — INSULIN GLARGINE 100 [IU]/ML
28 INJECTION, SOLUTION SUBCUTANEOUS EVERY MORNING
Qty: 1.96 ML | Refills: 0 | Status: SHIPPED | OUTPATIENT
Start: 2023-11-17 | End: 2023-11-24

## 2023-11-17 NOTE — ED PROVIDER NOTES
History  Chief Complaint   Patient presents with    Hyperglycemia - Symptomatic     Pt presents to ED from home w/ hyperglycemia and blurred vision, same as last visit to ED. Pt does not know how to use glucometer, so does not know what her blood sugar is. Pt taking 28 units insulin ever morning. Pt wants to be admitted. 52-year-old female with history of insulin-dependent diabetes, asthma, depression, hyperlipidemia, hypertension who presents for evaluation of blurred vision and reported hyperglycemia. Patient reports that she has had ongoing intermittent blurred vision since her last visit here on 10/20/2023 when her blood sugar was elevated. She notes that she has episodes that last for approximately 1 hour and then spontaneously resolved. She has been to the eye doctor multiple times and has been told that there is no evidence of diabetic retinopathy and that her vision was 20/20 most recently yesterday. She occasionally has mild headaches which she attributes to squinting in order to see. She does not have a headache currently. She denies any weakness or numbness in her extremities but states that her legs feel "like there is a lot of sugar in them."  She otherwise denies chest pain, shortness of breath, abdominal pain, nausea, vomiting, diarrhea, fevers. She notes that she has not been able to see her primary care physician because of a change in her insurance. She also states that she has not been using her glucometer as she does not know how to use it. She has still been using her insulin as prescribed, however. Prior to Admission Medications   Prescriptions Last Dose Informant Patient Reported? Taking?    ALPRAZolam (XANAX) 0.5 mg tablet   Yes No   Sig: Take 0.5 mg by mouth daily as needed   ALPRAZolam (XANAX) 2 MG tablet   Yes No   Aspirin Low Dose 81 MG chewable tablet   Yes No   Sig: Chew 81 mg daily Chew   BD Pen Needle Maile 2nd Gen 32G X 4 MM MISC   Yes No   Sig: Use as directed BD Pen Needle Maile 2nd Gen 32G X 4 MM MISC   No Yes   Sig: Inject under the skin in the morning Use as directed   Cholecalciferol (Vitamin D3) 50 MCG (2000 UT) TABS   Yes No   Sig: Take 2,000 Units by mouth daily   Lantus SoloStar 100 units/mL SOPN   No Yes   Sig: Inject 0.28 mL (28 Units total) under the skin every morning for 7 days   Lantus SoloStar 100 units/mL injection pen   Yes No   Multiple Vitamins-Minerals (ONE-A-DAY FOR HER VITACRAVES PO)   Yes No   Sig: Take by mouth   PARoxetine (PAXIL) 30 mg tablet   Yes No   Sig: Take 30 mg by mouth daily   QUEtiapine (SEROquel) 300 mg tablet   Yes No   Sig: Take 400 mg by mouth daily at bedtime   Trulicity 7.64 EC/4.0ZN injection   Yes No   clotrimazole-betamethasone (LOTRISONE) 1-0.05 % cream   No No   Sig: Apply topically 2 (two) times a day To affected vulvar areas for itching externally only.    fluticasone (FLONASE) 50 mcg/act nasal spray   No No   Si spray into each nostril daily   gabapentin (NEURONTIN) 300 mg capsule   Yes No   Sig: Take 300 mg by mouth 2 (two) times a day as needed   lisinopril (ZESTRIL) 2.5 mg tablet   Yes No   Sig: Take 2.5 mg by mouth daily   metFORMIN (GLUCOPHAGE) 1000 MG tablet   Yes No   Sig: Take 1,000 mg by mouth every morning   nicotine (NICODERM CQ) 21 mg/24 hr TD 24 hr patch   Yes No   ramipril (ALTACE) 2.5 mg capsule   Yes No   Sig: Take 2.5 mg by mouth daily   rosuvastatin (CRESTOR) 10 MG tablet   Yes No   Sig: Take 10 mg by mouth daily   rosuvastatin (CRESTOR) 20 MG tablet   Yes No      Facility-Administered Medications: None       Past Medical History:   Diagnosis Date    Asthma     Depression     Diabetes mellitus ([address])     type 2    Hyperlipidemia     Hypertension        Past Surgical History:   Procedure Laterality Date    KNEE ARTHROCENTESIS      MOUTH SURGERY         Family History   Problem Relation Age of Onset    Diabetes Mother     Diabetes Father     Ovarian cancer Sister     Colon cancer Brother      I have reviewed and agree with the history as documented. E-Cigarette/Vaping    E-Cigarette Use Never User      E-Cigarette/Vaping Substances    THC No     CBD No      Social History     Tobacco Use    Smoking status: Every Day     Packs/day: 1.00     Years: 35.00     Total pack years: 35.00     Types: Cigarettes    Smokeless tobacco: Never   Vaping Use    Vaping Use: Never used   Substance Use Topics    Alcohol use: Not Currently    Drug use: Never       Review of Systems   Constitutional:  Negative for chills and fever. Eyes:  Positive for visual disturbance. Negative for pain. Respiratory:  Negative for cough and shortness of breath. Cardiovascular:  Negative for chest pain. Gastrointestinal:  Negative for abdominal pain, diarrhea, nausea and vomiting. Genitourinary:  Negative for flank pain. Musculoskeletal:  Negative for gait problem. Skin:  Negative for rash. Neurological:  Positive for headaches. Negative for weakness, light-headedness and numbness. All other systems reviewed and are negative. Physical Exam  Physical Exam  Vitals and nursing note reviewed. Constitutional:       General: She is not in acute distress. Appearance: She is well-developed. She is not ill-appearing. HENT:      Head: Normocephalic and atraumatic. Nose: Nose normal.      Mouth/Throat:      Mouth: Mucous membranes are moist.   Eyes:      Extraocular Movements: Extraocular movements intact. Conjunctiva/sclera: Conjunctivae normal.      Pupils: Pupils are equal, round, and reactive to light. Cardiovascular:      Rate and Rhythm: Normal rate and regular rhythm. Heart sounds: No murmur heard. No friction rub. No gallop. Pulmonary:      Effort: Pulmonary effort is normal.      Breath sounds: Normal breath sounds. No wheezing, rhonchi or rales. Abdominal:      General: There is no distension. Palpations: Abdomen is soft. Tenderness: There is no abdominal tenderness. Musculoskeletal:         General: No swelling or tenderness. Normal range of motion. Cervical back: Normal range of motion and neck supple. Skin:     General: Skin is warm and dry. Coloration: Skin is not pale. Findings: No rash. Neurological:      General: No focal deficit present. Mental Status: She is alert and oriented to person, place, and time. Cranial Nerves: No cranial nerve deficit. Sensory: No sensory deficit. Motor: No weakness.       Coordination: Coordination normal.      Gait: Gait normal.      Comments: Speech normal.   Psychiatric:         Behavior: Behavior normal.         Vital Signs  ED Triage Vitals   Temperature Pulse Respirations Blood Pressure SpO2   11/17/23 1614 11/17/23 1612 11/17/23 1612 11/17/23 1612 11/17/23 1612   98 °F (36.7 °C) 103 16 148/86 100 %      Temp Source Heart Rate Source Patient Position - Orthostatic VS BP Location FiO2 (%)   11/17/23 1614 -- -- -- --   Oral          Pain Score       --                  Vitals:    11/17/23 1612   BP: 148/86   Pulse: 103         Visual Acuity  Visual Acuity      Flowsheet Row Most Recent Value   Visual acuity R eye is 20/20   Visual acuity Left eye is 20/25   Visual acuity in both eyes is 20/20            ED Medications  Medications - No data to display    Diagnostic Studies  Results Reviewed       Procedure Component Value Units Date/Time    hCG, qualitative pregnancy [[id number]]  (Normal) Collected: 11/17/23 1646    Lab Status: Final result Specimen: Blood from Arm, Left Updated: 11/17/23 1712     Preg, Serum Negative    Comprehensive metabolic panel [[id number]] Collected: 11/17/23 1646    Lab Status: Final result Specimen: Blood from Arm, Left Updated: 11/17/23 1704     Sodium 140 mmol/L      Potassium 3.5 mmol/L      Chloride 108 mmol/L      CO2 24 mmol/L      ANION GAP 8 mmol/L      BUN 7 mg/dL      Creatinine 0.60 mg/dL      Glucose 95 mg/dL      Calcium 9.3 mg/dL      AST 24 U/L      ALT 45 U/L Alkaline Phosphatase 90 U/L      Total Protein 6.9 g/dL      Albumin 4.4 g/dL      Total Bilirubin 0.42 mg/dL      eGFR 105 ml/min/1.73sq m     Narrative:      [place] guidelines for Chronic Kidney Disease (CKD):     Stage 1 with normal or high GFR (GFR > 90 mL/min/1.73 square meters)    Stage 2 Mild CKD (GFR = 60-89 mL/min/1.73 square meters)    Stage 3A Moderate CKD (GFR = 45-59 mL/min/1.73 square meters)    Stage 3B Moderate CKD (GFR = 30-44 mL/min/1.73 square meters)    Stage 4 Severe CKD (GFR = 15-29 mL/min/1.73 square meters)    Stage 5 End Stage CKD (GFR <15 mL/min/1.73 square meters)  Note: GFR calculation is accurate only with a steady state creatinine    Magnesium [[id number]]  (Normal) Collected: 11/17/23 1646    Lab Status: Final result Specimen: Blood from Arm, Left Updated: 11/17/23 1704     Magnesium 2.0 mg/dL     Urine Microscopic [[id number]]  (Normal) Collected: 11/17/23 1640    Lab Status: Final result Specimen: Urine, Clean Catch Updated: 11/17/23 1704     RBC, UA 0-1 /hpf      WBC, UA 0-1 /hpf      Epithelial Cells Occasional /hpf      Bacteria, UA Occasional /hpf     CBC and differential [[id number]]  (Abnormal) Collected: 11/17/23 1646    Lab Status: Final result Specimen: Blood from Arm, Left Updated: 11/17/23 1650     WBC 7.28 Thousand/uL      RBC 5.21 Million/uL      Hemoglobin 13.3 g/dL      Hematocrit 42.1 %      MCV 81 fL      MCH 25.5 pg      MCHC 31.6 g/dL      RDW 13.5 %      MPV 10.4 fL      Platelets 439 Thousands/uL      nRBC 0 /100 WBCs      Neutrophils Relative 71 %      Immat GRANS % 0 %      Lymphocytes Relative 22 %      Monocytes Relative 5 %      Eosinophils Relative 1 %      Basophils Relative 1 %      Neutrophils Absolute 5.17 Thousands/µL      Immature Grans Absolute 0.02 Thousand/uL      Lymphocytes Absolute 1.60 Thousands/µL      Monocytes Absolute 0.37 Thousand/µL      Eosinophils Absolute 0.08 Thousand/µL      Basophils Absolute 0.04 Thousands/µL     UA w Reflex to Microscopic w Reflex to Culture [[id number]]  (Abnormal) Collected: 11/17/23 1640    Lab Status: Final result Specimen: Urine, Clean Catch Updated: 11/17/23 1646     Color, UA Yellow     Clarity, UA Clear     Specific Gravity, UA 1.020     pH, UA 6.0     Leukocytes, UA Negative     Nitrite, UA Negative     Protein, UA Trace mg/dl      Glucose, UA Negative mg/dl      Ketones, UA Negative mg/dl      Urobilinogen, UA 0.2 E.U./dl      Bilirubin, UA Negative     Occult Blood, UA Negative    Fingerstick Glucose (POCT) [[id number]]  (Normal) Collected: 11/17/23 1617    Lab Status: Final result Updated: 11/17/23 1618     POC Glucose 91 mg/dl                    CT head without contrast   Final Result by Jordyn Amezcua MD (11/17 1658)      No acute intracranial abnormality. Workstation performed: [id number]                    Procedures  Procedures         ED Course  ED Course as of 11/18/23 0006   Fri Nov 17, 2023   1646 UA w Reflex to Microscopic w Reflex to Culture(!)   1650 CBC and differential(!)   1707 Magnesium: 2.0   1707 Comprehensive metabolic panel   5693 Urine Microscopic   1711 Procedure Note: EKG  Date/Time: 11/17/23 5:01 PM   Interpreted by: Natalya Gaspar  Indications / Diagnosis: dizziness  ECG reviewed by me, the ED Provider: yes   The EKG demonstrates:  Rhythm: rate 87, normal sinus  Intervals: normal intervals  Axis: normal axis  QRS/Blocks: normal QRS  ST Changes: No acute ST Changes, no STD/LEILA.    1713 PREGNANCY, SERUM: Negative                                             Medical Decision Making  52-year-old female presenting for evaluation of blurred vision and blood sugar concern. Vital signs stable on arrival.  Patient with an overall benign examination. Normal neurologic exam.  Visual acuity normal.  Differential diagnoses include not limited to intracranial hemorrhage, electrolyte abnormality, RUBIA, dehydration.   Labs overall unremarkable. CT head without any acute pathology noted. Patient otherwise stable for discharge at this time. Patient was educated by nursing on use of her glucometer. Return precautions discussed. Problems Addressed:  Blurred vision: acute illness or injury  History of diabetes mellitus: acute illness or injury  Medication refill: acute illness or injury    Amount and/or Complexity of Data Reviewed  Labs: ordered. Decision-making details documented in ED Course. Radiology: ordered. ECG/medicine tests: ordered and independent interpretation performed. Decision-making details documented in ED Course. Risk  OTC drugs. Prescription drug management. Disposition  Final diagnoses:   Blurred vision   History of diabetes mellitus   Medication refill     Time reflects when diagnosis was documented in both MDM as applicable and the Disposition within this note       Time User Action Codes Description Comment    2023  5:29 PM Ruth Apgar Add [H53.8] Blurred vision     2023  5:29 PM Ruth Apgar Add [Z86.39] History of diabetes mellitus     2023  5:29 PM Ruth Apgar Add [Z76.0] Medication refill           ED Disposition       ED Disposition   Discharge    Condition   Stable    Date/Time     5:28 PM    Comment   Shira Dos Santos discharge to home/self care.                    Follow-up Information       Follow up With Specialties Details Why Contact Info    Mariano Lloyd MD Family Medicine Schedule an appointment as soon as possible for a visit   [place] [id number] [address] [facility]  [phone]              Discharge Medication List as of 2023  5:32 PM        CONTINUE these medications which have CHANGED    Details   BD Pen Needle Maile 2nd Gen 32G X 4 MM MISC Inject under the skin in the morning Use as directed, Starting 2023, Until Sun 2023, Normal      Lantus SoloStar 100 units/mL SOPN Inject 0.28 mL (28 Units total) under the skin every morning for 7 days, Starting Fri 11/17/2023, Until Fri 11/24/2023, Normal           CONTINUE these medications which have NOT CHANGED    Details   !! ALPRAZolam (XANAX) 0.5 mg tablet Take 0.5 mg by mouth daily as needed, Starting Sat 3/13/2021, Historical Med      !! ALPRAZolam Ulysses Apley) 2 MG tablet Starting Mon 8/21/2023, Historical Med      Aspirin Low Dose 81 MG chewable tablet Chew 81 mg daily Chew, Starting Thu 4/1/2021, Historical Med      Cholecalciferol (Vitamin D3) 50 MCG (2000 UT) TABS Take 2,000 Units by mouth daily, Starting Wed 2/24/2021, Historical Med      clotrimazole-betamethasone (LOTRISONE) 1-0.05 % cream Apply topically 2 (two) times a day To affected vulvar areas for itching externally only. , Starting Mon 6/26/2023, Normal      fluticasone (FLONASE) 50 mcg/act nasal spray 1 spray into each nostril daily, Starting Tue 9/26/2023, Normal      gabapentin (NEURONTIN) 300 mg capsule Take 300 mg by mouth 2 (two) times a day as needed, Starting Thu 6/1/2023, Historical Med      lisinopril (ZESTRIL) 2.5 mg tablet Take 2.5 mg by mouth daily, Starting Wed 3/24/2021, Historical Med      metFORMIN (GLUCOPHAGE) 1000 MG tablet Take 1,000 mg by mouth every morning, Starting Tue 2/7/2023, Historical Med      Multiple Vitamins-Minerals (ONE-A-DAY FOR HER VITACRAVES PO) Take by mouth, Historical Med      nicotine (NICODERM CQ) 21 mg/24 hr TD 24 hr patch Starting Mon 5/8/2023, Historical Med      PARoxetine (PAXIL) 30 mg tablet Take 30 mg by mouth daily, Starting Sat 2/4/2023, Historical Med      QUEtiapine (SEROquel) 300 mg tablet Take 400 mg by mouth daily at bedtime, Starting Wed 3/8/2023, Historical Med      ramipril (ALTACE) 2.5 mg capsule Take 2.5 mg by mouth daily, Starting Wed 5/17/2023, Historical Med      !! rosuvastatin (CRESTOR) 10 MG tablet Take 10 mg by mouth daily, Starting u 4/1/2021, Historical Med      !! rosuvastatin (CRESTOR) 20 MG tablet Starting Sun 6/18/2023, Historical Med Trulicity 9.80 FH/1.1HF injection Historical Med       !! - Potential duplicate medications found. Please discuss with provider. No discharge procedures on file.     PDMP Review         Value Time User    PDMP Reviewed  Yes 9/23/2022  9:32 AM Mita Pat DO            ED Provider  Electronically Signed by             Gilles Rodriguez MD  11/18/23 3318

## 2023-11-17 NOTE — ED NOTES
Patient instructed on use of personal glucometer. Successful return demonstration of adequate use.     Reports blurred vision and was told by eye doctor that her vision is 20/20 but "they want to put me in glasses, but I don't need glasses"     Miles Rodriguez, XENIA  11/17/23 3627

## 2023-11-17 NOTE — DISCHARGE INSTRUCTIONS
Follow-up with your primary care physician as soon as possible. Please return to the emergency department if you develop worsening symptoms or anything else concerning to you.

## 2023-11-27 DIAGNOSIS — B96.89 BV (BACTERIAL VAGINOSIS): Primary | ICD-10-CM

## 2023-11-27 DIAGNOSIS — N76.0 BV (BACTERIAL VAGINOSIS): Primary | ICD-10-CM

## 2023-11-27 NOTE — TELEPHONE ENCOUNTER
Pt states she was in the ER recently because her sugar was at 400 and she is currently having vaginal itching and odor. Pt states the tests from the ER came back negative and Rajeev Sepulveda sent in metronidazole to help with itching and odor but she feels it made it worse. Pt would like to have something else called into the pharmacy.

## 2023-11-28 DIAGNOSIS — N89.8 VAGINAL ITCHING: Primary | ICD-10-CM

## 2023-11-28 RX ORDER — FLUCONAZOLE 150 MG/1
150 TABLET ORAL
Qty: 2 TABLET | Refills: 0 | Status: SHIPPED | OUTPATIENT
Start: 2023-11-28 | End: 2023-12-02

## 2023-11-28 RX ORDER — METRONIDAZOLE 7.5 MG/G
1 GEL VAGINAL DAILY
Qty: 70 G | Refills: 0 | Status: SHIPPED | OUTPATIENT
Start: 2023-11-28 | End: 2023-12-01

## 2023-11-30 ENCOUNTER — TELEPHONE (OUTPATIENT)
Dept: OBGYN CLINIC | Facility: CLINIC | Age: 53
End: 2023-11-30

## 2023-11-30 NOTE — TELEPHONE ENCOUNTER
Pt called desires Metronidazole in oral form, states the gel is not working,  she has been using it for 6 days with no improvement.

## 2023-11-30 NOTE — TELEPHONE ENCOUNTER
Margaret Pendleton,  Pt called desires Metronidazole in oral form, states the gel is not working,  she has been using it for 6 days with no improvement.      Separate message sent to [facility]

## 2023-12-01 ENCOUNTER — OFFICE VISIT (OUTPATIENT)
Dept: OBGYN CLINIC | Facility: CLINIC | Age: 53
End: 2023-12-01
Payer: COMMERCIAL

## 2023-12-01 VITALS
WEIGHT: 161 LBS | BODY MASS INDEX: 25.88 KG/M2 | SYSTOLIC BLOOD PRESSURE: 128 MMHG | DIASTOLIC BLOOD PRESSURE: 82 MMHG | HEIGHT: 66 IN

## 2023-12-01 DIAGNOSIS — Z11.3 SCREEN FOR STD (SEXUALLY TRANSMITTED DISEASE): ICD-10-CM

## 2023-12-01 DIAGNOSIS — N89.8 VAGINAL ODOR: ICD-10-CM

## 2023-12-01 DIAGNOSIS — R39.9 UTI SYMPTOMS: ICD-10-CM

## 2023-12-01 DIAGNOSIS — L29.2 VULVOVAGINAL ITCHING: Primary | ICD-10-CM

## 2023-12-01 DIAGNOSIS — N89.8 VAGINAL DISCHARGE: ICD-10-CM

## 2023-12-01 PROCEDURE — 99213 OFFICE O/P EST LOW 20 MIN: CPT | Performed by: PHYSICIAN ASSISTANT

## 2023-12-01 PROCEDURE — 87591 N.GONORRHOEAE DNA AMP PROB: CPT | Performed by: PHYSICIAN ASSISTANT

## 2023-12-01 PROCEDURE — 87510 GARDNER VAG DNA DIR PROBE: CPT | Performed by: PHYSICIAN ASSISTANT

## 2023-12-01 PROCEDURE — 87480 CANDIDA DNA DIR PROBE: CPT | Performed by: PHYSICIAN ASSISTANT

## 2023-12-01 PROCEDURE — 87086 URINE CULTURE/COLONY COUNT: CPT | Performed by: PHYSICIAN ASSISTANT

## 2023-12-01 PROCEDURE — 87660 TRICHOMONAS VAGIN DIR PROBE: CPT | Performed by: PHYSICIAN ASSISTANT

## 2023-12-01 PROCEDURE — 87491 CHLMYD TRACH DNA AMP PROBE: CPT | Performed by: PHYSICIAN ASSISTANT

## 2023-12-01 RX ORDER — QUETIAPINE FUMARATE 400 MG/1
400 TABLET, FILM COATED ORAL
COMMUNITY
Start: 2023-08-24

## 2023-12-01 RX ORDER — LANCETS 33 GAUGE
EACH MISCELLANEOUS 4 TIMES DAILY
COMMUNITY
Start: 2023-11-06

## 2023-12-01 RX ORDER — BLOOD-GLUCOSE METER
EACH MISCELLANEOUS
COMMUNITY
Start: 2023-11-06

## 2023-12-01 RX ORDER — CLOTRIMAZOLE AND BETAMETHASONE DIPROPIONATE 10; .64 MG/G; MG/G
CREAM TOPICAL 2 TIMES DAILY
Qty: 45 G | Refills: 1 | Status: SHIPPED | OUTPATIENT
Start: 2023-12-01 | End: 2023-12-08

## 2023-12-01 RX ORDER — BLOOD SUGAR DIAGNOSTIC
STRIP MISCELLANEOUS
COMMUNITY
Start: 2023-11-25

## 2023-12-01 NOTE — PROGRESS NOTES
Assessment/Plan:       Diagnoses and all orders for this visit:    Vulvovaginal itching  -     clotrimazole-betamethasone (LOTRISONE) 1-0.05 % cream; Apply topically 2 (two) times a day for 7 days To affected areas of vulva for itching.  -     VAGINOSIS DNA PROBE  -     Chlamydia/GC amplified DNA by PCR    Vaginal odor  -     VAGINOSIS DNA PROBE  -     Chlamydia/GC amplified DNA by PCR    Vaginal discharge  -     VAGINOSIS DNA PROBE  -     Chlamydia/GC amplified DNA by PCR    UTI symptoms  -     Urine culture    Screen for STD (sexually transmitted disease)  -     VAGINOSIS DNA PROBE  -     Chlamydia/GC amplified DNA by PCR    Other orders  -     Blood Glucose Monitoring Suppl (OneTouch Verio Flex System) w/Device KIT; Use as directed  -     OneTouch Verio test strip  -     Lancets (OneTouch Delica Plus [id number]) MISC; 4 (four) times a day Test  -     QUEtiapine (SEROquel) 400 MG tablet; Take 400 mg by mouth daily at bedtime (Patient not taking: Reported on 12/1/2023)     81-year-old female presenting for vaginal symptoms including vulvovaginal itching/irritation, vaginal discharge and strong fishy odor subjectively reported by patient. Patient recently in hospital due to uncontrolled diabetes  Patient also desire STD screening as partner may have been unfaithful  Patient also left random urine sample noting back ache, feeling warm and abdominal pain earlier this a.m. Patient already subjectively treated for possible BV several days ago with 5-day course of MetroGel patient saw no difference in treatment. 2 doses of fluconazole then subjectively sent in by Dr. Ary Junior 11/28 which patient has not yet picked up. Examination today revealing nontender abdomen, no acute distress  Normal vulvovaginal exam without any visible vulvar erythema, swelling or lesion. No significant abnormal discharge or foul odor appreciated on examination intravaginally.     Plan:  Would recommend waiting on test results to determine further treatment plan since no response to 5-day course of MetroGel. Patient desires treatment at this time, she can  2 doses of fluconazole that are already at the pharmacy for her. Topical Lotrisone for external use for itching/irritation prescribed for temporary symptomatic relief. Avoid sex at this time  Increase water intake  Tight diabetic sugar control    Affirm  GC/CT  Urine culture  Will contact patient with results and treat accordingly    Chief Complaint   Patient presents with    Vaginal Discharge     Vaginal odor, discharge and itching outside the vagina , abdominal pain, wants std testing         Subjective:      Patient ID: Nik Trevizo is a 47 y.o. female.    49y/o female here today for ongoing vaginal sxs, some urinary concerns. She states she has a bad itch on the outside of vagina and at vaginal opening. Also fishy odor and someone told her she has bad odor. Also abnormal vaginal discharge. She states she has abdominal pain this am B/L side, no pelvic pain. She did not check temp but felt hot, back was hurting. Also thinks she may have UTI - urine is darker. Denies specific urinary frequency, urgency or dysuria, denies hematuria. She was already prescribed metrogel several days go to use and didn't notice any difference in sxs. She requested oral metronidazole then but was not sent in. Fluconazole was prescribed subjectively on 11/28 but patient did not yet pick it up. States  from partner x 3 months. The following portions of the patient's history were reviewed and updated as appropriate: allergies, current medications, past family history, past medical history, past social history, past surgical history, and problem list.    Review of Systems   Constitutional:  Negative for chills, diaphoresis, fatigue and fever. Gastrointestinal:  Positive for abdominal pain. Negative for blood in stool, constipation, diarrhea, nausea and vomiting. Genitourinary:  Positive for vaginal discharge (and odor) and vaginal pain (itching). Negative for dysuria, frequency, genital sores, hematuria, pelvic pain, urgency and vaginal bleeding. Musculoskeletal:  Positive for back pain. Objective:      /82 (BP Location: Left arm, Patient Position: Sitting, Cuff Size: Adult)   Ht 5' 6" (1.676 m)   Wt 73 kg (161 lb)   Breastfeeding No   BMI 25.99 kg/m²          Physical Exam  Vitals reviewed. Constitutional:       General: She is not in acute distress. Appearance: Normal appearance. She is not ill-appearing or diaphoretic. Abdominal:      Tenderness: There is no abdominal tenderness. There is no right CVA tenderness, left CVA tenderness or guarding. Genitourinary:     General: Normal vulva. Labia:         Right: No rash, tenderness or lesion. Left: No rash, tenderness or lesion. Vagina: Normal. No vaginal discharge, erythema, tenderness, bleeding or lesions. Cervix: No cervical motion tenderness, discharge, lesion, erythema or cervical bleeding. Uterus: Not tender. Lymphadenopathy:      Lower Body: No right inguinal adenopathy. No left inguinal adenopathy. Neurological:      Mental Status: She is alert and oriented to person, place, and time. Psychiatric:         Mood and Affect: Mood normal.         Behavior: Behavior normal. Behavior is cooperative.

## 2023-12-02 LAB
BACTERIA UR CULT: NORMAL
CANDIDA RRNA VAG QL PROBE: NOT DETECTED
G VAGINALIS RRNA GENITAL QL PROBE: NOT DETECTED
T VAGINALIS RRNA GENITAL QL PROBE: NOT DETECTED

## 2023-12-04 LAB
C TRACH DNA SPEC QL NAA+PROBE: NEGATIVE
N GONORRHOEA DNA SPEC QL NAA+PROBE: NEGATIVE

## 2024-01-18 ENCOUNTER — TELEPHONE (OUTPATIENT)
Dept: OBGYN CLINIC | Facility: CLINIC | Age: 54
End: 2024-01-18

## 2024-01-18 DIAGNOSIS — N90.89 VULVAR IRRITATION: ICD-10-CM

## 2024-01-18 DIAGNOSIS — R30.0 DYSURIA: Primary | ICD-10-CM

## 2024-01-18 RX ORDER — FLUCONAZOLE 150 MG/1
150 TABLET ORAL ONCE
Qty: 1 TABLET | Refills: 0 | Status: SHIPPED | OUTPATIENT
Start: 2024-01-18 | End: 2024-01-18

## 2024-01-18 NOTE — TELEPHONE ENCOUNTER
"Patient complaining of irritation when urinating. States \"it hurts to urinate\". Patient states she tried monistat but not helping seems to have made it worse. Patient states diabetes is still out of control. Patient denies any discharge and states \"irritation is only when urinating not external vaginal irritation, denies any vaginal itching \"it went away\". Patient states she tried the Lotrisone cream and it caused burning. Tried offering patient an appointment for tomorrow patient declined due to possibility of bad weather. What do you recommend?   "

## 2024-01-18 NOTE — TELEPHONE ENCOUNTER
I called and tried to speak to patient to sort out her symptoms being that she was seen for similar symptoms December 1 and her vaginal culture, STD screening and urine culture were all negative for any kind of infection and patient symptoms seem to improve with topical Lotrisone.  Patient very tangential difficult to understand location of symptoms and exact symptoms she is experiencing.  She reports ambulance was just there and her sugar was fairly high at 176.  She has history of type 2 diabetes uncontrolled with last A1c 11.  Patient states she is irritated she noticed some blood when peeing unsure if it was intravaginal or urethral.  She says it hurts when she pees and external is irritated but unclear if she has vulvar irritation or in urethra.  Patient unable to come in for appointment at present.  Will try 1 dose of fluconazole considering her uncontrolled diabetes and elevated glucose for yeast infection and will order a urine culture, was advised to go to lab tonight to provide clean-catch urine sample.  Patient agreeable to this plan.  However I explained it would be better for her to be seen and to have an exam to further understand her symptoms and what is going on.  Patient agreeable to consider scheduling appointment tomorrow if weather is reasonable and she is able to make it in.  Otherwise if not, patient encouraged to schedule follow-up if symptoms persist.

## 2024-01-24 DIAGNOSIS — B00.9 HERPES: Primary | ICD-10-CM

## 2024-01-24 RX ORDER — VALACYCLOVIR HYDROCHLORIDE 500 MG/1
500 TABLET, FILM COATED ORAL 2 TIMES DAILY
COMMUNITY
End: 2024-01-24 | Stop reason: SDUPTHER

## 2024-01-24 RX ORDER — VALACYCLOVIR HYDROCHLORIDE 500 MG/1
500 TABLET, FILM COATED ORAL 2 TIMES DAILY
Qty: 6 TABLET | Refills: 0 | Status: SHIPPED | OUTPATIENT
Start: 2024-01-24 | End: 2024-01-27

## 2024-01-25 ENCOUNTER — TELEPHONE (OUTPATIENT)
Dept: OBGYN CLINIC | Facility: CLINIC | Age: 54
End: 2024-01-25

## 2024-01-25 NOTE — TELEPHONE ENCOUNTER
Patient had left a vmom that she needed medication refilled, saw that valtrex was ordered yesterday, and  I Called the patient back, unable to leave a vmom  no  vm set up yet

## 2024-02-04 ENCOUNTER — HOSPITAL ENCOUNTER (EMERGENCY)
Facility: HOSPITAL | Age: 54
Discharge: HOME | End: 2024-02-04
Attending: EMERGENCY MEDICINE
Payer: COMMERCIAL

## 2024-02-04 VITALS
RESPIRATION RATE: 16 BRPM | HEART RATE: 110 BPM | DIASTOLIC BLOOD PRESSURE: 86 MMHG | OXYGEN SATURATION: 100 % | SYSTOLIC BLOOD PRESSURE: 133 MMHG | TEMPERATURE: 98.2 F

## 2024-02-04 DIAGNOSIS — Z76.0 ENCOUNTER FOR MEDICATION REFILL: Primary | ICD-10-CM

## 2024-02-04 DIAGNOSIS — Z76.0 MEDICATION REFILL: ICD-10-CM

## 2024-02-04 PROCEDURE — 99282 EMERGENCY DEPT VISIT SF MDM: CPT

## 2024-02-04 PROCEDURE — 99284 EMERGENCY DEPT VISIT MOD MDM: CPT | Performed by: PHYSICIAN ASSISTANT

## 2024-02-04 RX ORDER — QUETIAPINE FUMARATE 400 MG/1
400 TABLET, FILM COATED ORAL
Qty: 30 TABLET | Refills: 0 | Status: SHIPPED | OUTPATIENT
Start: 2024-02-04 | End: 2024-02-04

## 2024-02-04 RX ORDER — INSULIN GLARGINE 100 [IU]/ML
28 INJECTION, SOLUTION SUBCUTANEOUS DAILY
Qty: 10 ML | Refills: 0 | Status: SHIPPED | OUTPATIENT
Start: 2024-02-04

## 2024-02-04 RX ORDER — INSULIN GLARGINE 100 [IU]/ML
28 INJECTION, SOLUTION SUBCUTANEOUS DAILY
Qty: 10 ML | Refills: 0 | Status: SHIPPED | OUTPATIENT
Start: 2024-02-04 | End: 2024-02-04

## 2024-02-04 RX ORDER — QUETIAPINE FUMARATE 400 MG/1
400 TABLET, FILM COATED ORAL
Qty: 30 TABLET | Refills: 0 | Status: SHIPPED | OUTPATIENT
Start: 2024-02-04

## 2024-02-04 NOTE — DISCHARGE INSTRUCTIONS
Please return to the emergency department for worsening symptoms including chest pain, shortness of breath, dizziness, lightheadedness, fever greater than 103, severe pain, inability to walk, fainting episodes, etc..  Please follow-up with your family practice provider as soon as possible.  Follow-up with a new PCP.

## 2024-02-05 NOTE — ED PROVIDER NOTES
History  Chief Complaint   Patient presents with    Medication Refill     Pt presents to ED requesting 30 day rx for Seroquel 400mg daily.     This is a 53-year-old female presenting to the emergency department today for a refill on her Seroquel and her insulin.  Patient notes she was recently kicked out of her PCPs office because she missed too many appointments.  She is trying to get an appointment with a new PCP but has not yet landed 1.  The patient notes she is running out of her Seroquel and insulin and is requesting a refill.  The patient denies other complaints at this time.      History provided by:  Patient   used: No    Medication Refill  Medications/supplies requested:  Seroquel; Insulin  Reason for request:  Clinic/provider not available  Medications taken before: yes - see home medications        Prior to Admission Medications   Prescriptions Last Dose Informant Patient Reported? Taking?   ALPRAZolam (XANAX) 0.5 mg tablet   Yes No   Sig: Take 0.5 mg by mouth daily as needed   ALPRAZolam (XANAX) 2 MG tablet   Yes No   Patient not taking: Reported on 2023   Aspirin Low Dose 81 MG chewable tablet   Yes No   Sig: Chew 81 mg daily Chew   BD Pen Needle Maile 2nd Gen 32G X 4 MM MISC   No No   Sig: Inject under the skin in the morning Use as directed   Blood Glucose Monitoring Suppl (OneTouch Verio Flex System) w/Device KIT   Yes No   Sig: Use as directed   Cholecalciferol (Vitamin D3) 50 MCG ( UT) TABS   Yes No   Sig: Take 2,000 Units by mouth daily   Patient not taking: Reported on 2023   Lancets (OneTouch Delica Plus [id number]) MISC   Yes No   Si (four) times a day Test   Lantus SoloStar 100 units/mL SOPN   No No   Sig: Inject 0.28 mL (28 Units total) under the skin every morning for 7 days   Multiple Vitamins-Minerals (ONE-A-DAY FOR HER VITACRAVES PO)   Yes No   Sig: Take by mouth   OneTouch Verio test strip   Yes No   PARoxetine (PAXIL) 30 mg tablet   Yes No   Sig:  Take 30 mg by mouth daily   Patient not taking: Reported on 12/1/2023   QUEtiapine (SEROquel) 300 mg tablet   Yes No   Sig: Take 400 mg by mouth daily at bedtime   QUEtiapine (SEROquel) 400 MG tablet   Yes No   Sig: Take 400 mg by mouth daily at bedtime   Patient not taking: Reported on 12/1/2023   QUEtiapine (SEROquel) 400 MG tablet   No Yes   Sig: Take 1 tablet (400 mg total) by mouth daily at bedtime   QUEtiapine (SEROquel) 400 MG tablet   No Yes   Sig: Take 1 tablet (400 mg total) by mouth daily at bedtime   Trulicity 0.75 MG/0.5ML injection   Yes No   Patient not taking: Reported on 12/1/2023   clotrimazole-betamethasone (LOTRISONE) 1-0.05 % cream   No No   Sig: Apply topically 2 (two) times a day for 7 days To affected areas of vulva for itching.   gabapentin (NEURONTIN) 300 mg capsule   Yes No   Sig: Take 300 mg by mouth 2 (two) times a day as needed   Patient not taking: Reported on 12/1/2023   lisinopril (ZESTRIL) 2.5 mg tablet   Yes No   Sig: Take 2.5 mg by mouth daily   metFORMIN (GLUCOPHAGE) 1000 MG tablet   Yes No   Sig: Take 1,000 mg by mouth every morning   nicotine (NICODERM CQ) 21 mg/24 hr TD 24 hr patch   Yes No   Patient not taking: Reported on 12/1/2023   ramipril (ALTACE) 2.5 mg capsule   Yes No   Sig: Take 2.5 mg by mouth daily   rosuvastatin (CRESTOR) 10 MG tablet   Yes No   Sig: Take 10 mg by mouth daily   rosuvastatin (CRESTOR) 20 MG tablet   Yes No   Patient not taking: Reported on 12/1/2023   valACYclovir (VALTREX) 500 mg tablet   No No   Sig: Take 1 tablet (500 mg total) by mouth 2 (two) times a day for 3 days      Facility-Administered Medications: None       Past Medical History:   Diagnosis Date    Asthma     Depression     Diabetes mellitus (HCC)     type 2    Hyperlipidemia     Hypertension        Past Surgical History:   Procedure Laterality Date    KNEE ARTHROCENTESIS      MOUTH SURGERY         Family History   Problem Relation Age of Onset    Diabetes Mother     Diabetes Father      Ovarian cancer Sister     Colon cancer Brother      I have reviewed and agree with the history as documented.    E-Cigarette/Vaping    E-Cigarette Use Never User      E-Cigarette/Vaping Substances    THC No     CBD No      Social History     Tobacco Use    Smoking status: Every Day     Current packs/day: 1.00     Average packs/day: 1 pack/day for 35.0 years (35.0 ttl pk-yrs)     Types: Cigarettes    Smokeless tobacco: Never   Vaping Use    Vaping status: Never Used   Substance Use Topics    Alcohol use: Not Currently    Drug use: Never       Review of Systems    Physical Exam  Physical Exam  Vitals and nursing note reviewed.   Constitutional:       General: She is not in acute distress.     Appearance: Normal appearance. She is normal weight. She is not ill-appearing, toxic-appearing or diaphoretic.   HENT:      Head: Normocephalic and atraumatic.      Nose: Nose normal. No congestion or rhinorrhea.      Mouth/Throat:      Mouth: Mucous membranes are moist.      Pharynx: No oropharyngeal exudate or posterior oropharyngeal erythema.   Eyes:      General: No scleral icterus.        Right eye: No discharge.         Left eye: No discharge.      Extraocular Movements: Extraocular movements intact.      Pupils: Pupils are equal, round, and reactive to light.   Cardiovascular:      Rate and Rhythm: Normal rate and regular rhythm.      Pulses: Normal pulses.      Heart sounds: Normal heart sounds. No murmur heard.     No friction rub. No gallop.   Pulmonary:      Effort: Pulmonary effort is normal. No respiratory distress.      Breath sounds: Normal breath sounds. No stridor. No wheezing, rhonchi or rales.   Chest:      Chest wall: No tenderness.   Musculoskeletal:         General: Normal range of motion.      Cervical back: Normal range of motion. No tenderness.      Right lower leg: No edema.      Left lower leg: No edema.   Skin:     General: Skin is warm and dry.      Capillary Refill: Capillary refill takes less  than 2 seconds.      Coloration: Skin is not jaundiced or pale.   Neurological:      General: No focal deficit present.      Mental Status: She is alert and oriented to person, place, and time. Mental status is at baseline.   Psychiatric:         Mood and Affect: Mood normal.         Behavior: Behavior normal.         Vital Signs  ED Triage Vitals   Temperature Pulse Respirations Blood Pressure SpO2   02/04/24 1837 02/04/24 1836 02/04/24 1836 02/04/24 1836 02/04/24 1836   98.2 °F (36.8 °C) (!) 110 16 133/86 100 %      Temp Source Heart Rate Source Patient Position - Orthostatic VS BP Location FiO2 (%)   02/04/24 1837 02/04/24 1836 02/04/24 1836 02/04/24 1836 --   Oral Monitor Sitting Left arm       Pain Score       --                  Vitals:    02/04/24 1836   BP: 133/86   Pulse: (!) 110   Patient Position - Orthostatic VS: Sitting         Visual Acuity      ED Medications  Medications - No data to display    Diagnostic Studies  Results Reviewed       None                   No orders to display              Procedures  Procedures         ED Course                                             Medical Decision Making  53-year-old female presenting to the emergency department today for a refill on her Seroquel and insulin.  Was recently kicked out of her PCPs office and has yet to make a new appointment.  Running out of insulin and Seroquel shortly.  Vital signs show tachycardia which improved with rest here in the emergency department.  Physical exam is reassuring.  Patient was provided refills on her Seroquel and insulin.  Stable for discharge at this time.  Follow-up with a new PCP; I gave the patient information for new PCP that she can follow-up with.  Strict return precautions were given.  Recommend PCP follow-up as soon as possible. The patient and/or patient's proxy verify their understanding and agree to the plan at this time.  All questions answered to the patient and/or their proxy's satisfaction.  All labs  "reviewed and utilized in the medical decision making process (if labs were ordered).  Portions of the record may have been created with voice recognition software.  Occasional wrong word or \"sound a like\" substitutions may have occurred due to the inherent limitations of voice recognition software.  Read the chart carefully and recognize, using context, where substitutions have occurred.    I reviewed prior notes and medications.    Problems Addressed:  Encounter for medication refill: undiagnosed new problem with uncertain prognosis    Amount and/or Complexity of Data Reviewed  External Data Reviewed: notes.    Risk  Prescription drug management.             Disposition  Final diagnoses:   Encounter for medication refill     Time reflects when diagnosis was documented in both MDM as applicable and the Disposition within this note       Time User Action Codes Description Comment    2/4/2024  6:49 PM aPwan Gonzalez Add [Z76.0] Encounter for medication refill     2/4/2024  6:49 PM Pawan Gonzalez [Z76.0] Medication refill           ED Disposition       ED Disposition   Discharge    Condition   Stable    Date/Time   Sun Feb 4, 2024  6:49 PM    Comment   Hyacinth Parra discharge to home/self care.                   Follow-up Information       Follow up With Specialties Details Why Contact Info Additional Information    [facility] Schedule an appointment as soon as possible for a visit   [address] [address] [phone] [facility], [address], [place], [place], [id number] [phone]    [facility] Emergency Medicine Go to  If symptoms worsen [address] [address]  [phone] [facility], [address] [address]            Discharge Medication List as of 2/4/2024  6:54 PM        CONTINUE " these medications which have CHANGED    Details   insulin glargine (Lantus) 100 units/mL subcutaneous injection Inject 28 Units under the skin daily, Starting Sun 2/4/2024, Normal      QUEtiapine (SEROquel) 400 MG tablet Take 1 tablet (400 mg total) by mouth daily at bedtime, Starting Sun 2/4/2024, Normal           CONTINUE these medications which have NOT CHANGED    Details   !! ALPRAZolam (XANAX) 0.5 mg tablet Take 0.5 mg by mouth daily as needed, Starting Sat 3/13/2021, Historical Med      !! ALPRAZolam (XANAX) 2 MG tablet Starting Mon 8/21/2023, Historical Med      Aspirin Low Dose 81 MG chewable tablet Chew 81 mg daily Chew, Starting Thu 4/1/2021, Historical Med      BD Pen Needle Maile 2nd Gen 32G X 4 MM MISC Inject under the skin in the morning Use as directed, Starting Fri 11/17/2023, Until Sun 12/17/2023, Normal      Blood Glucose Monitoring Suppl (OneTouch Verio Flex System) w/Device KIT Use as directed, Starting Mon 11/6/2023, Historical Med      Cholecalciferol (Vitamin D3) 50 MCG (2000 UT) TABS Take 2,000 Units by mouth daily, Starting Wed 2/24/2021, Historical Med      clotrimazole-betamethasone (LOTRISONE) 1-0.05 % cream Apply topically 2 (two) times a day for 7 days To affected areas of vulva for itching., Starting Fri 12/1/2023, Until Fri 12/8/2023, Normal      gabapentin (NEURONTIN) 300 mg capsule Take 300 mg by mouth 2 (two) times a day as needed, Starting Thu 6/1/2023, Historical Med      Lancets (OneTouch Delica Plus [id number]) MISC 4 (four) times a day Test, Starting Mon 11/6/2023, Historical Med      Lantus SoloStar 100 units/mL SOPN Inject 0.28 mL (28 Units total) under the skin every morning for 7 days, Starting Fri 11/17/2023, Until Fri 11/24/2023, Normal      lisinopril (ZESTRIL) 2.5 mg tablet Take 2.5 mg by mouth daily, Starting Wed 3/24/2021, Historical Med      metFORMIN (GLUCOPHAGE) 1000 MG tablet Take 1,000 mg by mouth every morning, Starting Tue 2/7/2023, Historical Med      Multiple  Vitamins-Minerals (ONE-A-DAY FOR HER VITACRAVES PO) Take by mouth, Historical Med      nicotine (NICODERM CQ) 21 mg/24 hr TD 24 hr patch Starting Mon 5/8/2023, Historical Med      OneTouch Verio test strip Historical Med      PARoxetine (PAXIL) 30 mg tablet Take 30 mg by mouth daily, Starting Sat 2/4/2023, Historical Med      ramipril (ALTACE) 2.5 mg capsule Take 2.5 mg by mouth daily, Starting Wed 5/17/2023, Historical Med      !! rosuvastatin (CRESTOR) 10 MG tablet Take 10 mg by mouth daily, Starting Thu 4/1/2021, Historical Med      !! rosuvastatin (CRESTOR) 20 MG tablet Starting Sun 6/18/2023, Historical Med      Trulicity 0.75 MG/0.5ML injection Historical Med      valACYclovir (VALTREX) 500 mg tablet Take 1 tablet (500 mg total) by mouth 2 (two) times a day for 3 days, Starting Wed 1/24/2024, Until Sat 1/27/2024, Normal       !! - Potential duplicate medications found. Please discuss with provider.          No discharge procedures on file.    PDMP Review         Value Time User    PDMP Reviewed  Yes 9/23/2022  9:32 AM Jazzmine Maldonado DO            ED Provider  Electronically Signed by             Pawan Gonzalez PA-C  02/04/24 1919

## 2024-02-07 ENCOUNTER — APPOINTMENT (OUTPATIENT)
Dept: LAB | Facility: HOSPITAL | Age: 54
End: 2024-02-07
Payer: COMMERCIAL

## 2024-02-07 DIAGNOSIS — Z11.59 NEED FOR HEPATITIS C SCREENING TEST: ICD-10-CM

## 2024-02-07 DIAGNOSIS — Z11.3 SCREEN FOR STD (SEXUALLY TRANSMITTED DISEASE): ICD-10-CM

## 2024-02-07 DIAGNOSIS — Z11.4 SCREENING FOR HIV (HUMAN IMMUNODEFICIENCY VIRUS): ICD-10-CM

## 2024-02-07 LAB
HBV SURFACE AG SER QL: NORMAL
HCV AB SER QL: NORMAL
HIV 1+2 AB+HIV1 P24 AG SERPL QL IA: NORMAL
HIV 2 AB SERPL QL IA: NORMAL
HIV1 AB SERPL QL IA: NORMAL
HIV1 P24 AG SERPL QL IA: NORMAL
TREPONEMA PALLIDUM IGG+IGM AB [PRESENCE] IN SERUM OR PLASMA BY IMMUNOASSAY: NORMAL

## 2024-02-07 PROCEDURE — 86780 TREPONEMA PALLIDUM: CPT

## 2024-02-07 PROCEDURE — 86803 HEPATITIS C AB TEST: CPT

## 2024-02-07 PROCEDURE — 87389 HIV-1 AG W/HIV-1&-2 AB AG IA: CPT

## 2024-02-07 PROCEDURE — 87340 HEPATITIS B SURFACE AG IA: CPT

## 2024-02-07 PROCEDURE — 36415 COLL VENOUS BLD VENIPUNCTURE: CPT

## 2024-02-08 LAB — BACTERIA UR CULT: NORMAL

## 2024-02-10 ENCOUNTER — HOSPITAL ENCOUNTER (EMERGENCY)
Facility: HOSPITAL | Age: 54
Discharge: HOME | End: 2024-02-10
Attending: EMERGENCY MEDICINE
Payer: COMMERCIAL

## 2024-02-10 ENCOUNTER — APPOINTMENT (EMERGENCY)
Dept: CT IMAGING | Facility: HOSPITAL | Age: 54
End: 2024-02-10
Payer: COMMERCIAL

## 2024-02-10 VITALS
HEART RATE: 76 BPM | OXYGEN SATURATION: 97 % | RESPIRATION RATE: 18 BRPM | SYSTOLIC BLOOD PRESSURE: 121 MMHG | TEMPERATURE: 98.1 F | DIASTOLIC BLOOD PRESSURE: 74 MMHG

## 2024-02-10 DIAGNOSIS — R10.9 FLANK PAIN: Primary | ICD-10-CM

## 2024-02-10 LAB
ALBUMIN SERPL BCP-MCNC: 4.4 G/DL (ref 3.5–5)
ALP SERPL-CCNC: 76 U/L (ref 34–104)
ALT SERPL W P-5'-P-CCNC: 22 U/L (ref 7–52)
ANION GAP SERPL CALCULATED.3IONS-SCNC: 10 MMOL/L
AST SERPL W P-5'-P-CCNC: 20 U/L (ref 13–39)
BACTERIA UR QL AUTO: ABNORMAL /HPF
BASOPHILS # BLD AUTO: 0.06 THOUSANDS/ÂΜL (ref 0–0.1)
BASOPHILS NFR BLD AUTO: 1 % (ref 0–1)
BILIRUB SERPL-MCNC: 0.42 MG/DL (ref 0.2–1)
BILIRUB UR QL STRIP: NEGATIVE
BUN SERPL-MCNC: 10 MG/DL (ref 5–25)
CALCIUM SERPL-MCNC: 9.7 MG/DL (ref 8.4–10.2)
CHLORIDE SERPL-SCNC: 107 MMOL/L (ref 96–108)
CLARITY UR: CLEAR
CO2 SERPL-SCNC: 24 MMOL/L (ref 21–32)
COLOR UR: YELLOW
CREAT SERPL-MCNC: 0.54 MG/DL (ref 0.6–1.3)
EOSINOPHIL # BLD AUTO: 0.08 THOUSAND/ÂΜL (ref 0–0.61)
EOSINOPHIL NFR BLD AUTO: 1 % (ref 0–6)
ERYTHROCYTE [DISTWIDTH] IN BLOOD BY AUTOMATED COUNT: 14 % (ref 11.6–15.1)
GFR SERPL CREATININE-BSD FRML MDRD: 108 ML/MIN/1.73SQ M
GLUCOSE SERPL-MCNC: 85 MG/DL (ref 65–140)
GLUCOSE UR STRIP-MCNC: NEGATIVE MG/DL
HCT VFR BLD AUTO: 41.4 % (ref 34.8–46.1)
HGB BLD-MCNC: 13.2 G/DL (ref 11.5–15.4)
HGB UR QL STRIP.AUTO: ABNORMAL
IMM GRANULOCYTES # BLD AUTO: 0.02 THOUSAND/UL (ref 0–0.2)
IMM GRANULOCYTES NFR BLD AUTO: 0 % (ref 0–2)
KETONES UR STRIP-MCNC: NEGATIVE MG/DL
LEUKOCYTE ESTERASE UR QL STRIP: NEGATIVE
LIPASE SERPL-CCNC: 16 U/L (ref 11–82)
LYMPHOCYTES # BLD AUTO: 1.31 THOUSANDS/ÂΜL (ref 0.6–4.47)
LYMPHOCYTES NFR BLD AUTO: 17 % (ref 14–44)
MCH RBC QN AUTO: 25.6 PG (ref 26.8–34.3)
MCHC RBC AUTO-ENTMCNC: 31.9 G/DL (ref 31.4–37.4)
MCV RBC AUTO: 80 FL (ref 82–98)
MONOCYTES # BLD AUTO: 0.33 THOUSAND/ÂΜL (ref 0.17–1.22)
MONOCYTES NFR BLD AUTO: 4 % (ref 4–12)
NEUTROPHILS # BLD AUTO: 5.71 THOUSANDS/ÂΜL (ref 1.85–7.62)
NEUTS SEG NFR BLD AUTO: 77 % (ref 43–75)
NITRITE UR QL STRIP: NEGATIVE
NON-SQ EPI CELLS URNS QL MICRO: ABNORMAL /HPF
NRBC BLD AUTO-RTO: 0 /100 WBCS
PH UR STRIP.AUTO: 6 [PH]
PLATELET # BLD AUTO: 217 THOUSANDS/UL (ref 149–390)
PMV BLD AUTO: 11.1 FL (ref 8.9–12.7)
POTASSIUM SERPL-SCNC: 3.7 MMOL/L (ref 3.5–5.3)
PROT SERPL-MCNC: 7 G/DL (ref 6.4–8.4)
PROT UR STRIP-MCNC: NEGATIVE MG/DL
RBC # BLD AUTO: 5.15 MILLION/UL (ref 3.81–5.12)
RBC #/AREA URNS AUTO: ABNORMAL /HPF
SODIUM SERPL-SCNC: 141 MMOL/L (ref 135–147)
SP GR UR STRIP.AUTO: 1.02 (ref 1–1.03)
UROBILINOGEN UR QL STRIP.AUTO: 0.2 E.U./DL
WBC # BLD AUTO: 7.51 THOUSAND/UL (ref 4.31–10.16)
WBC #/AREA URNS AUTO: ABNORMAL /HPF

## 2024-02-10 PROCEDURE — 96374 THER/PROPH/DIAG INJ IV PUSH: CPT

## 2024-02-10 PROCEDURE — 36415 COLL VENOUS BLD VENIPUNCTURE: CPT | Performed by: EMERGENCY MEDICINE

## 2024-02-10 PROCEDURE — 96361 HYDRATE IV INFUSION ADD-ON: CPT

## 2024-02-10 PROCEDURE — 99284 EMERGENCY DEPT VISIT MOD MDM: CPT

## 2024-02-10 PROCEDURE — 99284 EMERGENCY DEPT VISIT MOD MDM: CPT | Performed by: EMERGENCY MEDICINE

## 2024-02-10 PROCEDURE — 74176 CT ABD & PELVIS W/O CONTRAST: CPT

## 2024-02-10 PROCEDURE — G1004 CDSM NDSC: HCPCS

## 2024-02-10 PROCEDURE — 80053 COMPREHEN METABOLIC PANEL: CPT | Performed by: EMERGENCY MEDICINE

## 2024-02-10 PROCEDURE — 83690 ASSAY OF LIPASE: CPT | Performed by: EMERGENCY MEDICINE

## 2024-02-10 PROCEDURE — 81001 URINALYSIS AUTO W/SCOPE: CPT | Performed by: EMERGENCY MEDICINE

## 2024-02-10 PROCEDURE — 85025 COMPLETE CBC W/AUTO DIFF WBC: CPT | Performed by: EMERGENCY MEDICINE

## 2024-02-10 RX ORDER — KETOROLAC TROMETHAMINE 30 MG/ML
15 INJECTION, SOLUTION INTRAMUSCULAR; INTRAVENOUS ONCE
Status: COMPLETED | OUTPATIENT
Start: 2024-02-10 | End: 2024-02-10

## 2024-02-10 RX ADMIN — KETOROLAC TROMETHAMINE 15 MG: 30 INJECTION, SOLUTION INTRAMUSCULAR; INTRAVENOUS at 16:26

## 2024-02-10 RX ADMIN — SODIUM CHLORIDE 1000 ML: 0.9 INJECTION, SOLUTION INTRAVENOUS at 16:26

## 2024-02-10 NOTE — ED NOTES
Discharge instructions reviewed with pt. Pt verbalized understanding. And has no further questions at this time. Pt ambulatory off unit with steady gait.      Carli Mo RN  02/10/24 1800

## 2024-02-10 NOTE — ED PROVIDER NOTES
History  Chief Complaint   Patient presents with    Flank Pain     Pt presents to the ed with left flank pain that started about 1 week ago, no meds pta      Patient reports constant left flank pain beginning 1 week ago.  Pain waxes and wanes in intensity but never resolves.  It does sometimes radiate to her left lower pelvis.  She reports she has had pain with urination.  She notes that a few months ago she was diagnosed for the first time with genital herpes.  She completed a course of antivirals.  She is not having outbreak currently.  She continues to have some burning with urination.          Prior to Admission Medications   Prescriptions Last Dose Informant Patient Reported? Taking?   ALPRAZolam (XANAX) 0.5 mg tablet   Yes No   Sig: Take 0.5 mg by mouth daily as needed   ALPRAZolam (XANAX) 2 MG tablet   Yes No   Patient not taking: Reported on 2023   Aspirin Low Dose 81 MG chewable tablet   Yes No   Sig: Chew 81 mg daily Chew   BD Pen Needle Maile 2nd Gen 32G X 4 MM MISC   No No   Sig: Inject under the skin in the morning Use as directed   Blood Glucose Monitoring Suppl (OneTouch Verio Flex System) w/Device KIT   Yes No   Sig: Use as directed   Cholecalciferol (Vitamin D3) 50 MCG (2000 UT) TABS   Yes No   Sig: Take 2,000 Units by mouth daily   Patient not taking: Reported on 2023   Lancets (OneTouch Delica Plus [id number]) MISC   Yes No   Si (four) times a day Test   Lantus SoloStar 100 units/mL SOPN   No No   Sig: Inject 0.28 mL (28 Units total) under the skin every morning for 7 days   Multiple Vitamins-Minerals (ONE-A-DAY FOR HER VITACRAVES PO)   Yes No   Sig: Take by mouth   OneTouch Verio test strip   Yes No   PARoxetine (PAXIL) 30 mg tablet   Yes No   Sig: Take 30 mg by mouth daily   Patient not taking: Reported on 2023   QUEtiapine (SEROquel) 400 MG tablet   No No   Sig: Take 1 tablet (400 mg total) by mouth daily at bedtime   Trulicity 0.75 MG/0.5ML injection   Yes No   Patient not  taking: Reported on 12/1/2023   clotrimazole-betamethasone (LOTRISONE) 1-0.05 % cream   No No   Sig: Apply topically 2 (two) times a day for 7 days To affected areas of vulva for itching.   gabapentin (NEURONTIN) 300 mg capsule   Yes No   Sig: Take 300 mg by mouth 2 (two) times a day as needed   Patient not taking: Reported on 12/1/2023   insulin glargine (Lantus) 100 units/mL subcutaneous injection   No No   Sig: Inject 28 Units under the skin daily   lisinopril (ZESTRIL) 2.5 mg tablet   Yes No   Sig: Take 2.5 mg by mouth daily   metFORMIN (GLUCOPHAGE) 1000 MG tablet   Yes No   Sig: Take 1,000 mg by mouth every morning   nicotine (NICODERM CQ) 21 mg/24 hr TD 24 hr patch   Yes No   Patient not taking: Reported on 12/1/2023   ramipril (ALTACE) 2.5 mg capsule   Yes No   Sig: Take 2.5 mg by mouth daily   rosuvastatin (CRESTOR) 10 MG tablet   Yes No   Sig: Take 10 mg by mouth daily   rosuvastatin (CRESTOR) 20 MG tablet   Yes No   Patient not taking: Reported on 12/1/2023   valACYclovir (VALTREX) 500 mg tablet   No No   Sig: Take 1 tablet (500 mg total) by mouth 2 (two) times a day for 3 days      Facility-Administered Medications: None       Past Medical History:   Diagnosis Date    Asthma     Depression     Diabetes mellitus (HCC)     type 2    Hyperlipidemia     Hypertension        Past Surgical History:   Procedure Laterality Date    KNEE ARTHROCENTESIS      MOUTH SURGERY         Family History   Problem Relation Age of Onset    Diabetes Mother     Diabetes Father     Ovarian cancer Sister     Colon cancer Brother      I have reviewed and agree with the history as documented.    E-Cigarette/Vaping    E-Cigarette Use Never User      E-Cigarette/Vaping Substances    THC No     CBD No      Social History     Tobacco Use    Smoking status: Every Day     Current packs/day: 1.00     Average packs/day: 1 pack/day for 35.0 years (35.0 ttl pk-yrs)     Types: Cigarettes    Smokeless tobacco: Never   Vaping Use    Vaping  status: Never Used   Substance Use Topics    Alcohol use: Not Currently    Drug use: Never       Review of Systems   All other systems reviewed and are negative.      Physical Exam  Physical Exam  Vitals and nursing note reviewed.   Constitutional:       General: She is not in acute distress.     Appearance: She is well-developed.   HENT:      Head: Normocephalic and atraumatic.   Eyes:      Conjunctiva/sclera: Conjunctivae normal.   Cardiovascular:      Rate and Rhythm: Normal rate and regular rhythm.      Heart sounds: No murmur heard.  Pulmonary:      Effort: Pulmonary effort is normal. No respiratory distress.      Breath sounds: Normal breath sounds.   Abdominal:      Palpations: Abdomen is soft.      Tenderness: There is no abdominal tenderness. There is left CVA tenderness. There is no right CVA tenderness.   Musculoskeletal:         General: No swelling.      Cervical back: Neck supple.   Skin:     General: Skin is warm and dry.      Capillary Refill: Capillary refill takes less than 2 seconds.   Neurological:      General: No focal deficit present.      Mental Status: She is alert.      Cranial Nerves: No cranial nerve deficit.   Psychiatric:         Mood and Affect: Mood normal.         Vital Signs  ED Triage Vitals   Temperature Pulse Respirations Blood Pressure SpO2   02/10/24 1551 02/10/24 1551 02/10/24 1551 02/10/24 1551 02/10/24 1551   98.1 °F (36.7 °C) 96 18 140/88 99 %      Temp Source Heart Rate Source Patient Position - Orthostatic VS BP Location FiO2 (%)   02/10/24 1551 02/10/24 1551 02/10/24 1800 02/10/24 1800 --   Oral Monitor Sitting Left arm       Pain Score       02/10/24 1626       10 - Worst Possible Pain           Vitals:    02/10/24 1551 02/10/24 1800   BP: 140/88 121/74   Pulse: 96 76   Patient Position - Orthostatic VS:  Sitting         Visual Acuity      ED Medications  Medications   ketorolac (TORADOL) injection 15 mg (15 mg Intravenous Given 2/10/24 1626)   sodium chloride 0.9 %  bolus 1,000 mL (0 mL Intravenous Stopped 2/10/24 1759)       Diagnostic Studies  Results Reviewed       Procedure Component Value Units Date/Time    Urine Microscopic [[id number]]  (Abnormal) Collected: 02/10/24 1631    Lab Status: Final result Specimen: Urine, Clean Catch Updated: 02/10/24 1730     RBC, UA 1-2 /hpf      WBC, UA 2-4 /hpf      Epithelial Cells Moderate /hpf      Bacteria, UA Moderate /hpf     UA w Reflex to Microscopic w Reflex to Culture [[id number]]  (Abnormal) Collected: 02/10/24 1631    Lab Status: Final result Specimen: Urine, Clean Catch Updated: 02/10/24 1706     Color, UA Yellow     Clarity, UA Clear     Specific Gravity, UA 1.025     pH, UA 6.0     Leukocytes, UA Negative     Nitrite, UA Negative     Protein, UA Negative mg/dl      Glucose, UA Negative mg/dl      Ketones, UA Negative mg/dl      Urobilinogen, UA 0.2 E.U./dl      Bilirubin, UA Negative     Occult Blood, UA Trace-Intact    Comprehensive metabolic panel [[id number]]  (Abnormal) Collected: 02/10/24 1626    Lab Status: Final result Specimen: Blood from Arm, Left Updated: 02/10/24 1652     Sodium 141 mmol/L      Potassium 3.7 mmol/L      Chloride 107 mmol/L      CO2 24 mmol/L      ANION GAP 10 mmol/L      BUN 10 mg/dL      Creatinine 0.54 mg/dL      Glucose 85 mg/dL      Calcium 9.7 mg/dL      AST 20 U/L      ALT 22 U/L      Alkaline Phosphatase 76 U/L      Total Protein 7.0 g/dL      Albumin 4.4 g/dL      Total Bilirubin 0.42 mg/dL      eGFR 108 ml/min/1.73sq m     Narrative:      National Kid[employer] Disease Foundation guidelines for Chronic Kidney Disease (CKD):     Stage 1 with normal or high GFR (GFR > 90 mL/min/1.73 square meters)    Stage 2 Mild CKD (GFR = 60-89 mL/min/1.73 square meters)    Stage 3A Moderate CKD (GFR = 45-59 mL/min/1.73 square meters)    Stage 3B Moderate CKD (GFR = 30-44 mL/min/1.73 square meters)    Stage 4 Severe CKD (GFR = 15-29 mL/min/1.73 square meters)    Stage 5 End Stage CKD (GFR <15 mL/min/1.73 square  meters)  Note: GFR calculation is accurate only with a steady state creatinine    Lipase [[id number]]  (Normal) Collected: 02/10/24 1626    Lab Status: Final result Specimen: Blood from Arm, Left Updated: 02/10/24 1652     Lipase 16 u/L     CBC and differential [[id number]]  (Abnormal) Collected: 02/10/24 1626    Lab Status: Final result Specimen: Blood from Arm, Left Updated: 02/10/24 1634     WBC 7.51 Thousand/uL      RBC 5.15 Million/uL      Hemoglobin 13.2 g/dL      Hematocrit 41.4 %      MCV 80 fL      MCH 25.6 pg      MCHC 31.9 g/dL      RDW 14.0 %      MPV 11.1 fL      Platelets 217 Thousands/uL      nRBC 0 /100 WBCs      Neutrophils Relative 77 %      Immat GRANS % 0 %      Lymphocytes Relative 17 %      Monocytes Relative 4 %      Eosinophils Relative 1 %      Basophils Relative 1 %      Neutrophils Absolute 5.71 Thousands/µL      Immature Grans Absolute 0.02 Thousand/uL      Lymphocytes Absolute 1.31 Thousands/µL      Monocytes Absolute 0.33 Thousand/µL      Eosinophils Absolute 0.08 Thousand/µL      Basophils Absolute 0.06 Thousands/µL                    CT renal stone study abdomen pelvis without contrast   Final Result by Max Garcia MD (02/10 1642)      No acute findings in the abdomen or the pelvis.         Workstation performed: [id number]                    Procedures  Procedures         ED Course  ED Course as of 02/10/24 2302   Sat Feb 10, 2024   1707 Blood, UA(!): Trace-Intact   2302 WBC, UA: 2-4  Not suggestive of infection.  I reassessed the patient prior to discharge.  She does feel improved.  Advised need for follow-up.  Patient verbalized understanding and agreed.  Unclear etiology of her pain at this point but she is nontoxic with reassuring CT.                               SBIRT 22yo+      Flowsheet Row Most Recent Value   Initial Alcohol Screen: US AUDIT-C     1. How often do you have a drink containing alcohol? 0 Filed at: 02/10/2024 1551   2. How many drinks containing  alcohol do you have on a typical day you are drinking?  0 Filed at: 02/10/2024 1551   3a. Male UNDER 65: How often do you have five or more drinks on one occasion? 0 Filed at: 02/10/2024 1551   3b. FEMALE Any Age, or MALE 65+: How often do you have 4 or more drinks on one occassion? 0 Filed at: 02/10/2024 1551   Audit-C Score 0 Filed at: 02/10/2024 1551   QUE: How many times in the past year have you...    Used an illegal drug or used a prescription medication for non-medical reasons? Never Filed at: 02/10/2024 1551                      Medical Decision Making  Differential includes musculoskeletal etiology, pyelonephritis, renal colic.  Will obtain CT, labs, and reevaluate.    Amount and/or Complexity of Data Reviewed  Labs: ordered. Decision-making details documented in ED Course.  Radiology: ordered.    Risk  Prescription drug management.             Disposition  Final diagnoses:   Flank pain     Time reflects when diagnosis was documented in both MDM as applicable and the Disposition within this note       Time User Action Codes Description Comment    2/10/2024  5:32 PM Suhas Boo Add [R10.9] Flank pain           ED Disposition       ED Disposition   Discharge    Condition   Stable    Date/Time   Sat Feb 10, 2024 1732    Comment   Hyacinth Parra discharge to home/self care.                   Follow-up Information       Follow up With Specialties Details Why Contact Info    Halle Holcomb MD Family Medicine In 3 days  [address] [address]  [phone]              Discharge Medication List as of 2/10/2024  5:33 PM        CONTINUE these medications which have NOT CHANGED    Details   !! ALPRAZolam (XANAX) 0.5 mg tablet Take 0.5 mg by mouth daily as needed, Starting Sat 3/13/2021, Historical Med      !! ALPRAZolam (XANAX) 2 MG tablet Starting Mon 8/21/2023, Historical Med      Aspirin Low Dose 81 MG chewable tablet Chew 81 mg daily Chew, Starting Thu 4/1/2021, Historical Med      BD  Pen Needle Maile 2nd Gen 32G X 4 MM MISC Inject under the skin in the morning Use as directed, Starting Fri 11/17/2023, Until Sun 12/17/2023, Normal      Blood Glucose Monitoring Suppl (OneTouch Verio Flex System) w/Device KIT Use as directed, Starting Mon 11/6/2023, Historical Med      Cholecalciferol (Vitamin D3) 50 MCG (2000 UT) TABS Take 2,000 Units by mouth daily, Starting Wed 2/24/2021, Historical Med      clotrimazole-betamethasone (LOTRISONE) 1-0.05 % cream Apply topically 2 (two) times a day for 7 days To affected areas of vulva for itching., Starting Fri 12/1/2023, Until Fri 12/8/2023, Normal      gabapentin (NEURONTIN) 300 mg capsule Take 300 mg by mouth 2 (two) times a day as needed, Starting Thu 6/1/2023, Historical Med      insulin glargine (Lantus) 100 units/mL subcutaneous injection Inject 28 Units under the skin daily, Starting Sun 2/4/2024, Normal      Lancets (OneTouch Delica Plus [id number]) MISC 4 (four) times a day Test, Starting Mon 11/6/2023, Historical Med      Lantus SoloStar 100 units/mL SOPN Inject 0.28 mL (28 Units total) under the skin every morning for 7 days, Starting Fri 11/17/2023, Until Fri 11/24/2023, Normal      lisinopril (ZESTRIL) 2.5 mg tablet Take 2.5 mg by mouth daily, Starting Wed 3/24/2021, Historical Med      metFORMIN (GLUCOPHAGE) 1000 MG tablet Take 1,000 mg by mouth every morning, Starting Tue 2/7/2023, Historical Med      Multiple Vitamins-Minerals (ONE-A-DAY FOR HER VITACRAVES PO) Take by mouth, Historical Med      nicotine (NICODERM CQ) 21 mg/24 hr TD 24 hr patch Starting Mon 5/8/2023, Historical Med      OneTouch Verio test strip Historical Med      PARoxetine (PAXIL) 30 mg tablet Take 30 mg by mouth daily, Starting Sat 2/4/2023, Historical Med      QUEtiapine (SEROquel) 400 MG tablet Take 1 tablet (400 mg total) by mouth daily at bedtime, Starting Sun 2/4/2024, Normal      ramipril (ALTACE) 2.5 mg capsule Take 2.5 mg by mouth daily, Starting Wed 5/17/2023,  Historical Med      !! rosuvastatin (CRESTOR) 10 MG tablet Take 10 mg by mouth daily, Starting Thu 4/1/2021, Historical Med      !! rosuvastatin (CRESTOR) 20 MG tablet Starting Sun 6/18/2023, Historical Med      Trulicity 0.75 MG/0.5ML injection Historical Med      valACYclovir (VALTREX) 500 mg tablet Take 1 tablet (500 mg total) by mouth 2 (two) times a day for 3 days, Starting Wed 1/24/2024, Until Sat 1/27/2024, Normal       !! - Potential duplicate medications found. Please discuss with provider.          No discharge procedures on file.    PDMP Review         Value Time User    PDMP Reviewed  Yes 9/23/2022  9:32 AM Jazzmine Maldonado DO            ED Provider  Electronically Signed by             Suhas Boo MD  02/10/24 1616

## 2024-02-26 ENCOUNTER — OFFICE VISIT (OUTPATIENT)
Dept: INTERNAL MEDICINE CLINIC | Facility: CLINIC | Age: 54
End: 2024-02-26
Payer: COMMERCIAL

## 2024-02-26 VITALS
HEART RATE: 104 BPM | TEMPERATURE: 97.6 F | RESPIRATION RATE: 16 BRPM | SYSTOLIC BLOOD PRESSURE: 110 MMHG | HEIGHT: 65 IN | OXYGEN SATURATION: 99 % | DIASTOLIC BLOOD PRESSURE: 78 MMHG | BODY MASS INDEX: 26.16 KG/M2 | WEIGHT: 157 LBS

## 2024-02-26 DIAGNOSIS — E78.5 HYPERLIPIDEMIA, UNSPECIFIED HYPERLIPIDEMIA TYPE: ICD-10-CM

## 2024-02-26 DIAGNOSIS — E11.9 TYPE 2 DIABETES MELLITUS WITHOUT COMPLICATION, UNSPECIFIED WHETHER LONG TERM INSULIN USE (HCC): Primary | ICD-10-CM

## 2024-02-26 DIAGNOSIS — I10 HYPERTENSION, UNSPECIFIED TYPE: ICD-10-CM

## 2024-02-26 DIAGNOSIS — F31.9 BIPOLAR AFFECTIVE DISORDER, REMISSION STATUS UNSPECIFIED (HCC): ICD-10-CM

## 2024-02-26 PROCEDURE — 99204 OFFICE O/P NEW MOD 45 MIN: CPT

## 2024-02-26 RX ORDER — BLOOD SUGAR DIAGNOSTIC
STRIP MISCELLANEOUS
Qty: 300 EACH | Refills: 3 | Status: SHIPPED | OUTPATIENT
Start: 2024-02-26

## 2024-02-26 RX ORDER — SITAGLIPTIN AND METFORMIN HYDROCHLORIDE 1000; 50 MG/1; MG/1
1 TABLET, FILM COATED, EXTENDED RELEASE ORAL
Qty: 30 TABLET | Refills: 0 | Status: SHIPPED | OUTPATIENT
Start: 2024-02-26

## 2024-02-26 RX ORDER — LANCETS 33 GAUGE
EACH MISCELLANEOUS
Qty: 300 EACH | Refills: 3 | Status: SHIPPED | OUTPATIENT
Start: 2024-02-26

## 2024-02-26 RX ORDER — ROSUVASTATIN CALCIUM 20 MG/1
20 TABLET, COATED ORAL DAILY
Qty: 30 TABLET | Refills: 0 | Status: SHIPPED | OUTPATIENT
Start: 2024-02-26

## 2024-02-26 RX ORDER — BLOOD-GLUCOSE METER
KIT MISCELLANEOUS
Qty: 1 KIT | Refills: 0 | Status: SHIPPED | OUTPATIENT
Start: 2024-02-26
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
## 2024-02-26 NOTE — ASSESSMENT & PLAN NOTE
Reported history, diagnosed 21 years ago  Currently stable on seroquel 400 mg qhs  Will refill seroquel, highly important that patient reestablishes care with psychiatric services.  Will place referral.    Return to care in 1 month

## 2024-02-26 NOTE — ASSESSMENT & PLAN NOTE
Lab Results   Component Value Date    HGBA1C 11.0 (H) 03/20/2023   Maintained on lantus 30 U qd however compliance is questionable.  Has run out of medication and had recent ED visit where blood glucose was in the 400s  Will add Janumet in addition to long acting insulin  Recheck A1c  Urine Pr/Cr ordered  Lipid panel ordered

## 2024-02-26 NOTE — ASSESSMENT & PLAN NOTE
Lab Results   Component Value Date    CHOLESTEROL 222 (H) 03/20/2023    TRIG 194 (H) 03/20/2023    HDL 45 (L) 03/20/2023    LDLCALC 138 (H) 03/20/2023     Restart crestor 20 mg daily  Repeat lipid panel

## 2024-02-27 ENCOUNTER — TELEPHONE (OUTPATIENT)
Dept: PSYCHIATRY | Facility: CLINIC | Age: 54
End: 2024-02-27

## 2024-02-27 NOTE — TELEPHONE ENCOUNTER
[facility] called pt about a referral for services. Call went to voice mail, but mailbox was not set up yet to leave a message.

## 2024-02-29 NOTE — TELEPHONE ENCOUNTER
[facility] called pt about the referral for services. Message came on that the number is not in service at this time.  2nd outreach attempt to pt.

## 2024-03-07 NOTE — TELEPHONE ENCOUNTER
IC called pt about the referral for services. Message came on that the number is not in service at this time.  3rd outreach attempt to pt.

## 2024-03-13 DIAGNOSIS — Z76.0 ENCOUNTER FOR MEDICATION REFILL: ICD-10-CM

## 2024-03-13 RX ORDER — QUETIAPINE FUMARATE 400 MG/1
400 TABLET, FILM COATED ORAL
Qty: 30 TABLET | Refills: 0 | Status: SHIPPED | OUTPATIENT
Start: 2024-03-13

## 2024-03-13 NOTE — TELEPHONE ENCOUNTER
Patient has called stating she is in need of a refil lof Seroquel. She states the doctor here said they would prescribe this for her until she could get in with psychiatry. Please review and if you cannot fill med please call patient as she is stating she is out of medication. It is unclear how long  she is out of medication since she's takes she has been taking partial doses.

## 2024-03-14 ENCOUNTER — TELEPHONE (OUTPATIENT)
Dept: INTERNAL MEDICINE CLINIC | Facility: CLINIC | Age: 54
End: 2024-03-14

## 2024-03-14 DIAGNOSIS — I10 PRIMARY HYPERTENSION: Primary | ICD-10-CM

## 2024-03-14 RX ORDER — ASPIRIN 81 MG/1
81 TABLET ORAL DAILY
Qty: 30 TABLET | Refills: 2 | Status: SHIPPED | OUTPATIENT
Start: 2024-03-14 | End: 2024-06-12

## 2024-04-02 DIAGNOSIS — E11.9 TYPE 2 DIABETES MELLITUS WITHOUT COMPLICATION, UNSPECIFIED WHETHER LONG TERM INSULIN USE (HCC): ICD-10-CM

## 2024-04-02 RX ORDER — SITAGLIPTIN AND METFORMIN HYDROCHLORIDE 1000; 50 MG/1; MG/1
1 TABLET, FILM COATED, EXTENDED RELEASE ORAL
Qty: 30 TABLET | Refills: 0 | Status: SHIPPED | OUTPATIENT
Start: 2024-04-02

## 2024-04-09 DIAGNOSIS — Z76.0 ENCOUNTER FOR MEDICATION REFILL: ICD-10-CM

## 2024-04-09 RX ORDER — QUETIAPINE FUMARATE 400 MG/1
400 TABLET, FILM COATED ORAL
Qty: 90 TABLET | Refills: 0 | Status: SHIPPED | OUTPATIENT
Start: 2024-04-09

## 2024-04-26 ENCOUNTER — OFFICE VISIT (OUTPATIENT)
Dept: INTERNAL MEDICINE CLINIC | Facility: CLINIC | Age: 54
End: 2024-04-26
Payer: COMMERCIAL

## 2024-04-26 VITALS
BODY MASS INDEX: 25.39 KG/M2 | DIASTOLIC BLOOD PRESSURE: 80 MMHG | OXYGEN SATURATION: 97 % | HEIGHT: 66 IN | TEMPERATURE: 98.8 F | RESPIRATION RATE: 16 BRPM | WEIGHT: 158 LBS | SYSTOLIC BLOOD PRESSURE: 120 MMHG | HEART RATE: 95 BPM

## 2024-04-26 DIAGNOSIS — E11.9 TYPE 2 DIABETES MELLITUS WITHOUT COMPLICATION, UNSPECIFIED WHETHER LONG TERM INSULIN USE (HCC): ICD-10-CM

## 2024-04-26 DIAGNOSIS — I10 HYPERTENSION, UNSPECIFIED TYPE: ICD-10-CM

## 2024-04-26 DIAGNOSIS — Z12.2 SCREENING FOR LUNG CANCER: ICD-10-CM

## 2024-04-26 DIAGNOSIS — Z00.00 ANNUAL PHYSICAL EXAM: ICD-10-CM

## 2024-04-26 DIAGNOSIS — F41.9 ANXIETY: ICD-10-CM

## 2024-04-26 DIAGNOSIS — E78.5 HYPERLIPIDEMIA, UNSPECIFIED HYPERLIPIDEMIA TYPE: ICD-10-CM

## 2024-04-26 DIAGNOSIS — F17.210 SMOKING GREATER THAN 20 PACK YEARS: ICD-10-CM

## 2024-04-26 DIAGNOSIS — F31.9 BIPOLAR AFFECTIVE DISORDER, REMISSION STATUS UNSPECIFIED (HCC): Primary | ICD-10-CM

## 2024-04-26 DIAGNOSIS — Z12.31 ENCOUNTER FOR SCREENING MAMMOGRAM FOR MALIGNANT NEOPLASM OF BREAST: ICD-10-CM

## 2024-04-26 DIAGNOSIS — Z12.31 ENCOUNTER FOR SCREENING MAMMOGRAM FOR BREAST CANCER: ICD-10-CM

## 2024-04-26 PROBLEM — Z12.39 BREAST CANCER SCREENING: Status: ACTIVE | Noted: 2024-04-26

## 2024-04-26 LAB
SL AMB  POCT GLUCOSE, UA: NORMAL
SL AMB LEUKOCYTE ESTERASE,UA: NORMAL
SL AMB POCT BILIRUBIN,UA: NORMAL
SL AMB POCT BLOOD,UA: NORMAL
SL AMB POCT CLARITY,UA: CLEAR
SL AMB POCT COLOR,UA: YELLOW
SL AMB POCT HEMOGLOBIN AIC: 6.1 (ref ?–6.5)
SL AMB POCT KETONES,UA: NORMAL
SL AMB POCT NITRITE,UA: NORMAL
SL AMB POCT PH,UA: 6
SL AMB POCT SPECIFIC GRAVITY,UA: 1.02
SL AMB POCT URINE PROTEIN: NORMAL
SL AMB POCT UROBILINOGEN: NORMAL

## 2024-04-26 PROCEDURE — 83036 HEMOGLOBIN GLYCOSYLATED A1C: CPT

## 2024-04-26 PROCEDURE — 81002 URINALYSIS NONAUTO W/O SCOPE: CPT

## 2024-04-26 PROCEDURE — 99214 OFFICE O/P EST MOD 30 MIN: CPT

## 2024-04-26 RX ORDER — SITAGLIPTIN AND METFORMIN HYDROCHLORIDE 1000; 50 MG/1; MG/1
1 TABLET, FILM COATED, EXTENDED RELEASE ORAL
Qty: 90 TABLET | Refills: 0 | Status: SHIPPED | OUTPATIENT
Start: 2024-04-26 | End: 2024-07-25

## 2024-04-26 RX ORDER — ALPRAZOLAM 0.5 MG/1
0.5 TABLET ORAL
Qty: 30 TABLET | Refills: 0 | Status: SHIPPED | OUTPATIENT
Start: 2024-04-26 | End: 2024-05-26

## 2024-04-26 RX ORDER — CHOLECALCIFEROL (VITAMIN D3) 125 MCG
2000 CAPSULE ORAL DAILY
Qty: 90 TABLET | Refills: 0 | Status: SHIPPED | OUTPATIENT
Start: 2024-04-26 | End: 2024-07-25

## 2024-04-26 RX ORDER — RAMIPRIL 2.5 MG/1
2.5 CAPSULE ORAL DAILY
Qty: 90 CAPSULE | Refills: 0 | Status: SHIPPED | OUTPATIENT
Start: 2024-04-26 | End: 2024-07-25

## 2024-04-26 RX ORDER — ROSUVASTATIN CALCIUM 20 MG/1
20 TABLET, COATED ORAL DAILY
Qty: 30 TABLET | Refills: 0 | Status: SHIPPED | OUTPATIENT
Start: 2024-04-26 | End: 2024-07-25
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
## 2024-04-26 NOTE — ASSESSMENT & PLAN NOTE
I discussed with her that she is a candidate for lung cancer CT screening.     The following Shared Decision-Making points were covered:  Benefits of screening were discussed, including the rates of reduction in death from lung cancer and other causes.  Harms of screening were reviewed, including false positive tests, radiation exposure levels, risks of invasive procedures, risks of complications of screening, and risk of overdiagnosis.  I counseled on the importance of adherence to annual lung cancer LDCT screening, impact of co-morbidities, and ability or willingness to undergo diagnosis and treatment.  I counseled on the importance of maintaining abstinence as a former smoker or was counseled on the importance of smoking cessation if a current smoker    Review of Eligibility Criteria: She meets all of the criteria for Lung Cancer Screening.   She is 53 y.o.   She has 20 pack year tobacco history and is a current smoker or has quit within the past 15 years  She presents no signs or symptoms of lung cancer    After discussion, the patient decided to elect lung cancer screening.    PLAN:  Low Dose CT Ordered
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
## 2024-04-26 NOTE — ASSESSMENT & PLAN NOTE
Lab Results   Component Value Date    CHOLESTEROL 222 (H) 03/20/2023    TRIG 194 (H) 03/20/2023    HDL 45 (L) 03/20/2023    LDLCALC 138 (H) 03/20/2023     PLAN  Pt asked to get blood work completed that was ordered at previous visit.  Continue rosuvastatin 20mg PO Daily

## 2024-04-26 NOTE — ASSESSMENT & PLAN NOTE
BP [id number] 110/78  BP [id number]:     PLAN:  Continue lisinopril 2.5mg PO daily   Life style modifications  Eat real food, not too much, mostly plants  Avoid processed foods  DASH Diet  Limit salt intake: education given on how to read nutriton labels to find salt (ie listed as Salt, NaCl, Sodium). Pt was educated on types of foods and drinks with high salt content.   Do not drink your calories  Eat a piece of fruit or vegetable 30 mins prior to meals  Avoid sugar and sugar substitutes  Limit meat products  Per AHA guidelines, recommend moderate-vigorous intensity exercise for 30 minutes a day for 5 days a week or a total of 150 min/week  Keep exercise journal  Small changes in activity makes a big difference ie take stairs instead of elevators or park farther away from you destination  Try to weigh yourself daily at same time of day and keep journal  Keep food journal   Sleep hygine  Mindful meditation for 15mins a day 5 days a week. Insight timer a good free gloria.

## 2024-04-26 NOTE — ASSESSMENT & PLAN NOTE
Lab Results   Component Value Date    HGBA1C 11.0 (H) 03/20/2023     Lab Results   Component Value Date    CHOLESTEROL 222 (H) 03/20/2023    TRIG 194 (H) 03/20/2023    HDL 45 (L) 03/20/2023    LDLCALC 138 (H) 03/20/2023     POC A1c 23AML97 6.1   Home RX Lantus 30daily   Has run out of insulin and gone to ED for refill when glucose was >400  A1c still pending from previous office visit.   Last visit janumet was added    PLAN:  Follow up on A1c  Follow up on lipid panel   Diabetic foot exam in office  DM Eye exam referral given

## 2024-04-26 NOTE — ASSESSMENT & PLAN NOTE
Pt is overdue for annual physical    PLAN:  Follow up visit for exam to be scheduled within one month  Lipid panel ordered  A1c Ordered

## 2024-05-05 ENCOUNTER — HOSPITAL ENCOUNTER (EMERGENCY)
Facility: HOSPITAL | Age: 54
Discharge: HOME | End: 2024-05-05
Attending: EMERGENCY MEDICINE | Admitting: EMERGENCY MEDICINE
Payer: COMMERCIAL

## 2024-05-05 ENCOUNTER — APPOINTMENT (EMERGENCY)
Dept: RADIOLOGY | Facility: HOSPITAL | Age: 54
End: 2024-05-05
Payer: COMMERCIAL

## 2024-05-05 VITALS
OXYGEN SATURATION: 99 % | HEART RATE: 91 BPM | TEMPERATURE: 97.5 F | RESPIRATION RATE: 16 BRPM | DIASTOLIC BLOOD PRESSURE: 81 MMHG | SYSTOLIC BLOOD PRESSURE: 123 MMHG

## 2024-05-05 DIAGNOSIS — R06.02 SHORTNESS OF BREATH: Primary | ICD-10-CM

## 2024-05-05 DIAGNOSIS — R07.9 CHEST PAIN: ICD-10-CM

## 2024-05-05 LAB
ALBUMIN SERPL BCP-MCNC: 4.3 G/DL (ref 3.5–5)
ALP SERPL-CCNC: 77 U/L (ref 34–104)
ALT SERPL W P-5'-P-CCNC: 21 U/L (ref 7–52)
ANION GAP SERPL CALCULATED.3IONS-SCNC: 10 MMOL/L (ref 4–13)
AST SERPL W P-5'-P-CCNC: 22 U/L (ref 13–39)
ATRIAL RATE: 88 BPM
BASOPHILS # BLD AUTO: 0.04 THOUSANDS/ÂΜL (ref 0–0.1)
BASOPHILS NFR BLD AUTO: 1 % (ref 0–1)
BILIRUB SERPL-MCNC: 0.34 MG/DL (ref 0.2–1)
BNP SERPL-MCNC: 50 PG/ML (ref 0–100)
BUN SERPL-MCNC: 14 MG/DL (ref 5–25)
CALCIUM SERPL-MCNC: 9.5 MG/DL (ref 8.4–10.2)
CARDIAC TROPONIN I PNL SERPL HS: <2 NG/L
CHLORIDE SERPL-SCNC: 105 MMOL/L (ref 96–108)
CO2 SERPL-SCNC: 24 MMOL/L (ref 21–32)
CREAT SERPL-MCNC: 0.59 MG/DL (ref 0.6–1.3)
D DIMER PPP FEU-MCNC: <0.27 UG/ML FEU
EOSINOPHIL # BLD AUTO: 0.14 THOUSAND/ÂΜL (ref 0–0.61)
EOSINOPHIL NFR BLD AUTO: 2 % (ref 0–6)
ERYTHROCYTE [DISTWIDTH] IN BLOOD BY AUTOMATED COUNT: 14.4 % (ref 11.6–15.1)
GFR SERPL CREATININE-BSD FRML MDRD: 105 ML/MIN/1.73SQ M
GLUCOSE SERPL-MCNC: 112 MG/DL (ref 65–140)
HCT VFR BLD AUTO: 41.6 % (ref 34.8–46.1)
HGB BLD-MCNC: 13.2 G/DL (ref 11.5–15.4)
IMM GRANULOCYTES # BLD AUTO: 0.03 THOUSAND/UL (ref 0–0.2)
IMM GRANULOCYTES NFR BLD AUTO: 0 % (ref 0–2)
LYMPHOCYTES # BLD AUTO: 1.26 THOUSANDS/ÂΜL (ref 0.6–4.47)
LYMPHOCYTES NFR BLD AUTO: 15 % (ref 14–44)
MCH RBC QN AUTO: 26 PG (ref 26.8–34.3)
MCHC RBC AUTO-ENTMCNC: 31.7 G/DL (ref 31.4–37.4)
MCV RBC AUTO: 82 FL (ref 82–98)
MONOCYTES # BLD AUTO: 0.31 THOUSAND/ÂΜL (ref 0.17–1.22)
MONOCYTES NFR BLD AUTO: 4 % (ref 4–12)
NEUTROPHILS # BLD AUTO: 6.58 THOUSANDS/ÂΜL (ref 1.85–7.62)
NEUTS SEG NFR BLD AUTO: 78 % (ref 43–75)
NRBC BLD AUTO-RTO: 0 /100 WBCS
P AXIS: 59 DEGREES
PLATELET # BLD AUTO: 190 THOUSANDS/UL (ref 149–390)
PMV BLD AUTO: 10.7 FL (ref 8.9–12.7)
POTASSIUM SERPL-SCNC: 4.2 MMOL/L (ref 3.5–5.3)
PR INTERVAL: 140 MS
PROT SERPL-MCNC: 6.9 G/DL (ref 6.4–8.4)
QRS AXIS: 44 DEGREES
QRSD INTERVAL: 78 MS
QT INTERVAL: 380 MS
QTC INTERVAL: 459 MS
RBC # BLD AUTO: 5.07 MILLION/UL (ref 3.81–5.12)
SODIUM SERPL-SCNC: 139 MMOL/L (ref 135–147)
T WAVE AXIS: 40 DEGREES
VENTRICULAR RATE: 88 BPM
WBC # BLD AUTO: 8.36 THOUSAND/UL (ref 4.31–10.16)

## 2024-05-05 PROCEDURE — 71045 X-RAY EXAM CHEST 1 VIEW: CPT

## 2024-05-05 PROCEDURE — 85379 FIBRIN DEGRADATION QUANT: CPT | Performed by: EMERGENCY MEDICINE

## 2024-05-05 PROCEDURE — 99285 EMERGENCY DEPT VISIT HI MDM: CPT | Performed by: EMERGENCY MEDICINE

## 2024-05-05 PROCEDURE — 84484 ASSAY OF TROPONIN QUANT: CPT | Performed by: EMERGENCY MEDICINE

## 2024-05-05 PROCEDURE — 94640 AIRWAY INHALATION TREATMENT: CPT

## 2024-05-05 PROCEDURE — 93005 ELECTROCARDIOGRAM TRACING: CPT

## 2024-05-05 PROCEDURE — 99284 EMERGENCY DEPT VISIT MOD MDM: CPT

## 2024-05-05 PROCEDURE — 36415 COLL VENOUS BLD VENIPUNCTURE: CPT | Performed by: EMERGENCY MEDICINE

## 2024-05-05 PROCEDURE — 93010 ELECTROCARDIOGRAM REPORT: CPT | Performed by: INTERNAL MEDICINE

## 2024-05-05 PROCEDURE — 80053 COMPREHEN METABOLIC PANEL: CPT | Performed by: EMERGENCY MEDICINE

## 2024-05-05 PROCEDURE — 85025 COMPLETE CBC W/AUTO DIFF WBC: CPT | Performed by: EMERGENCY MEDICINE

## 2024-05-05 PROCEDURE — 83880 ASSAY OF NATRIURETIC PEPTIDE: CPT | Performed by: EMERGENCY MEDICINE

## 2024-05-05 RX ORDER — ALBUTEROL SULFATE 2.5 MG/3ML
5 SOLUTION RESPIRATORY (INHALATION) ONCE
Status: COMPLETED | OUTPATIENT
Start: 2024-05-05 | End: 2024-05-05

## 2024-05-05 RX ORDER — PREDNISONE 20 MG/1
40 TABLET ORAL DAILY
Qty: 10 TABLET | Refills: 0 | Status: SHIPPED | OUTPATIENT
Start: 2024-05-05 | End: 2024-05-10

## 2024-05-05 RX ADMIN — IPRATROPIUM BROMIDE 0.5 MG: 0.5 SOLUTION RESPIRATORY (INHALATION) at 16:51

## 2024-05-05 RX ADMIN — ALBUTEROL SULFATE 5 MG: 2.5 SOLUTION RESPIRATORY (INHALATION) at 16:51

## 2024-05-05 NOTE — DISCHARGE INSTRUCTIONS
Follow-up with your primary care physician.  Please return to the emergency department if you develop worsening symptoms or anything else concerning to you.

## 2024-05-05 NOTE — ED PROVIDER NOTES
History  Chief Complaint   Patient presents with    Asthma     Pt reports asthma exacerbation starting this morning upon awakening, took inhalers without relief     53-year-old female with history of asthma, depression, diabetes, hypertension, hyperlipidemia who presents for evaluation of chest pain and shortness of breath.  Patient reports onset of symptoms this morning after awakening.  She has had progressively worsening symptoms.  She feels short of breath and feels like her chest is tight.  She is experiencing some right-sided chest pain.  No exacerbating or alleviating factors.  No nausea or diaphoresis.  No cough or fevers.  She tried using her albuterol inhaler with minimal relief.        Prior to Admission Medications   Prescriptions Last Dose Informant Patient Reported? Taking?   ALPRAZolam (XANAX) 0.5 mg tablet   No No   Sig: Take 1 tablet (0.5 mg total) by mouth daily at bedtime as needed for sleep or anxiety   BD Pen Needle Maile 2nd Gen 32G X 4 MM MISC   No No   Sig: Inject under the skin in the morning Use as directed   Blood Glucose Monitoring Suppl (OneTouch Verio Flex System) w/Device KIT   Yes No   Sig: Use as directed   Blood Glucose Monitoring Suppl (OneTouch Verio Reflect) w/Device KIT   No No   Sig: Check blood sugars three times daily. Please substitute with appropriate alternative as covered by patient's insurance. Dx: E11.65   Patient not taking: Reported on 2024   Cholecalciferol (Vitamin D3) 50 MCG ( UT) TABS   No No   Sig: Take 1 tablet (2,000 Units total) by mouth daily   Lancets (OneTouch Delica Plus [id number]) MISC   Yes No   Si (four) times a day Test   Lantus SoloStar 100 units/mL SOPN   No No   Sig: Inject 0.28 mL (28 Units total) under the skin every morning for 7 days   Multiple Vitamins-Minerals (ONE-A-DAY FOR HER VITACRAVES PO)   Yes No   Sig: Take by mouth   OneTouch Delica Lancets 33G MISC   No No   Sig: Check blood sugars three times daily. Please substitute with  appropriate alternative as covered by patient's insurance. Dx: E11.65   PARoxetine (PAXIL) 30 mg tablet   Yes No   Sig: Take 30 mg by mouth daily   Patient not taking: Reported on 12/1/2023   QUEtiapine (SEROquel) 400 MG tablet   No No   Sig: TAKE 1 TABLET (400 MG TOTAL) BY MOUTH DAILY AT BEDTIME   SITagliptin-metFORMIN HCl ER (Janumet XR)  MG TB24   No No   Sig: Take 1 tablet by mouth daily with dinner   aspirin (ECOTRIN LOW STRENGTH) 81 mg EC tablet   No No   Sig: Take 1 tablet (81 mg total) by mouth daily   clotrimazole-betamethasone (LOTRISONE) 1-0.05 % cream   No No   Sig: Apply topically 2 (two) times a day for 7 days To affected areas of vulva for itching.   gabapentin (NEURONTIN) 300 mg capsule   Yes No   Sig: Take 300 mg by mouth 2 (two) times a day as needed   Patient not taking: Reported on 12/1/2023   glucose blood (OneTouch Verio) test strip   No No   Sig: Check blood sugars three times daily. Please substitute with appropriate alternative as covered by patient's insurance. Dx: E11.65   insulin glargine (Lantus) 100 units/mL subcutaneous injection   No No   Sig: Inject 28 Units under the skin daily   nicotine (NICODERM CQ) 21 mg/24 hr TD 24 hr patch   Yes No   Patient not taking: Reported on 12/1/2023   ramipril (ALTACE) 2.5 mg capsule   No No   Sig: Take 1 capsule (2.5 mg total) by mouth daily   rosuvastatin (CRESTOR) 20 MG tablet   No No   Sig: Take 1 tablet (20 mg total) by mouth daily   valACYclovir (VALTREX) 500 mg tablet   No No   Sig: Take 1 tablet (500 mg total) by mouth 2 (two) times a day for 3 days   Patient not taking: Reported on 4/26/2024      Facility-Administered Medications: None       Past Medical History:   Diagnosis Date    Asthma     Depression     Diabetes mellitus (HCC)     type 2    Hyperlipidemia     Hypertension        Past Surgical History:   Procedure Laterality Date    KNEE ARTHROCENTESIS      MOUTH SURGERY         Family History   Problem Relation Age of Onset     Diabetes Mother     Diabetes Father     Ovarian cancer Sister     Colon cancer Brother      I have reviewed and agree with the history as documented.    E-Cigarette/Vaping    E-Cigarette Use Never User      E-Cigarette/Vaping Substances    THC No     CBD No      Social History     Tobacco Use    Smoking status: Every Day     Current packs/day: 1.00     Average packs/day: 1 pack/day for 35.0 years (35.0 ttl pk-yrs)     Types: Cigarettes    Smokeless tobacco: Never   Vaping Use    Vaping status: Never Used   Substance Use Topics    Alcohol use: Not Currently    Drug use: Never       Review of Systems   Constitutional:  Negative for chills and fever.   Respiratory:  Positive for shortness of breath. Negative for cough.    Cardiovascular:  Positive for chest pain. Negative for leg swelling.   Gastrointestinal:  Negative for abdominal pain, diarrhea, nausea and vomiting.   Genitourinary:  Negative for flank pain.   Musculoskeletal:  Negative for gait problem.   Skin:  Negative for rash.   Neurological:  Negative for weakness and light-headedness.   All other systems reviewed and are negative.      Physical Exam  Physical Exam  Vitals and nursing note reviewed.   Constitutional:       General: She is not in acute distress.     Appearance: She is well-developed. She is not ill-appearing.   HENT:      Head: Normocephalic and atraumatic.      Nose: Nose normal.      Mouth/Throat:      Mouth: Mucous membranes are moist.   Eyes:      Conjunctiva/sclera: Conjunctivae normal.   Cardiovascular:      Rate and Rhythm: Normal rate and regular rhythm.      Heart sounds: No murmur heard.     No friction rub. No gallop.   Pulmonary:      Effort: Pulmonary effort is normal.      Breath sounds: Normal breath sounds. No wheezing, rhonchi or rales.   Abdominal:      General: There is no distension.      Palpations: Abdomen is soft.      Tenderness: There is no abdominal tenderness.   Musculoskeletal:         General: No swelling or  tenderness. Normal range of motion.      Cervical back: Normal range of motion and neck supple.   Skin:     General: Skin is warm and dry.      Coloration: Skin is not pale.      Findings: No rash.   Neurological:      General: No focal deficit present.      Mental Status: She is alert and oriented to person, place, and time.   Psychiatric:         Behavior: Behavior normal.         Vital Signs  ED Triage Vitals   Temperature Pulse Respirations Blood Pressure SpO2   05/05/24 1628 05/05/24 1627 05/05/24 1627 05/05/24 1627 05/05/24 1627   97.5 °F (36.4 °C) 98 20 129/81 97 %      Temp Source Heart Rate Source Patient Position - Orthostatic VS BP Location FiO2 (%)   05/05/24 1628 05/05/24 1633 05/05/24 1633 05/05/24 1637 --   Oral Monitor Sitting Left arm       Pain Score       05/05/24 1637       7           Vitals:    05/05/24 1627 05/05/24 1633 05/05/24 1637   BP: 129/81  123/81   Pulse: 98  91   Patient Position - Orthostatic VS:  Sitting Lying         Visual Acuity  Visual Acuity      Flowsheet Row Most Recent Value   L Pupil Size (mm) 4   R Pupil Size (mm) 4            ED Medications  Medications   ipratropium (ATROVENT) 0.02 % inhalation solution 0.5 mg (0.5 mg Nebulization Given 5/5/24 1651)   albuterol inhalation solution 5 mg (5 mg Nebulization Given 5/5/24 1651)       Diagnostic Studies  Results Reviewed       Procedure Component Value Units Date/Time    D-Dimer [[id number]]  (Normal) Collected: 05/05/24 1653    Lab Status: Final result Specimen: Blood from Arm, Right Updated: 05/05/24 1738     D-Dimer, Quant <0.27 ug/ml FEU     Narrative:      In the evaluation for possible pulmonary embolism, in the appropriate (Well's Score of 4 or less) patient, the age adjusted d-dimer cutoff for this patient can be calculated as:    Age x 0.01 (in ug/mL) for Age-adjusted D-dimer exclusion threshold for a patient over 50 years.    HS Troponin 0hr (reflex protocol) [[id number]]  (Normal) Collected: 05/05/24 1653    Lab  Status: Final result Specimen: Blood from Arm, Right Updated: 05/05/24 1728     hs TnI 0hr <2 ng/L     B-Type Natriuretic Peptide(BNP) [[id number]]  (Normal) Collected: 05/05/24 1653    Lab Status: Final result Specimen: Blood from Arm, Right Updated: 05/05/24 1726     BNP 50 pg/mL     Comprehensive metabolic panel [[id number]]  (Abnormal) Collected: 05/05/24 1653    Lab Status: Final result Specimen: Blood from Arm, Right Updated: 05/05/24 1719     Sodium 139 mmol/L      Potassium 4.2 mmol/L      Chloride 105 mmol/L      CO2 24 mmol/L      ANION GAP 10 mmol/L      BUN 14 mg/dL      Creatinine 0.59 mg/dL      Glucose 112 mg/dL      Calcium 9.5 mg/dL      AST 22 U/L      ALT 21 U/L      Alkaline Phosphatase 77 U/L      Total Protein 6.9 g/dL      Albumin 4.3 g/dL      Total Bilirubin 0.34 mg/dL      eGFR 105 ml/min/1.73sq m     Narrative:      National Kid[employer] Disease Foundation guidelines for Chronic Kidney Disease (CKD):     Stage 1 with normal or high GFR (GFR > 90 mL/min/1.73 square meters)    Stage 2 Mild CKD (GFR = 60-89 mL/min/1.73 square meters)    Stage 3A Moderate CKD (GFR = 45-59 mL/min/1.73 square meters)    Stage 3B Moderate CKD (GFR = 30-44 mL/min/1.73 square meters)    Stage 4 Severe CKD (GFR = 15-29 mL/min/1.73 square meters)    Stage 5 End Stage CKD (GFR <15 mL/min/1.73 square meters)  Note: GFR calculation is accurate only with a steady state creatinine    CBC and differential [[id number]]  (Abnormal) Collected: 05/05/24 1653    Lab Status: Final result Specimen: Blood from Arm, Right Updated: 05/05/24 1704     WBC 8.36 Thousand/uL      RBC 5.07 Million/uL      Hemoglobin 13.2 g/dL      Hematocrit 41.6 %      MCV 82 fL      MCH 26.0 pg      MCHC 31.7 g/dL      RDW 14.4 %      MPV 10.7 fL      Platelets 190 Thousands/uL      nRBC 0 /100 WBCs      Segmented % 78 %      Immature Grans % 0 %      Lymphocytes % 15 %      Monocytes % 4 %      Eosinophils Relative 2 %      Basophils Relative 1 %      Absolute  Neutrophils 6.58 Thousands/µL      Absolute Immature Grans 0.03 Thousand/uL      Absolute Lymphocytes 1.26 Thousands/µL      Absolute Monocytes 0.31 Thousand/µL      Eosinophils Absolute 0.14 Thousand/µL      Basophils Absolute 0.04 Thousands/µL                    XR chest 1 view portable   ED Interpretation by Alyssa Bo MD (05/05 1803)   No infiltrate or pneumothorax. Independently interpreted by me.                 Procedures  Procedures         ED Course  ED Course as of 05/05/24 1827   Sun May 05, 2024   1704 CBC and differential(!)   1705 Procedure Note: EKG  Date/Time: 05/05/24 4:46 PM   Interpreted by: Alyssa Bo  Indications / Diagnosis: CP  ECG reviewed by me, the ED Provider: yes   The EKG demonstrates:  Rhythm: rate 88, normal sinus  Intervals: normal intervals  Axis: normal axis  QRS/Blocks: normal QRS  ST Changes: No acute ST Changes, no STD/LEILA.    1722 Comprehensive metabolic panel(!)   1726 BNP: 50   1729 hs TnI 0hr: <2   1738 D-Dimer, Quant: <0.27             HEART Risk Score      Flowsheet Row Most Recent Value   Heart Score Risk Calculator    History 0 Filed at: 05/05/2024 1739   ECG 0 Filed at: 05/05/2024 1739   Age 0 Filed at: 05/05/2024 1739   Risk Factors 1 Filed at: 05/05/2024 1739   Troponin 0 Filed at: 05/05/2024 1739   HEART Score 1 Filed at: 05/05/2024 1739                              Wells' Criteria for PE      Flowsheet Row Most Recent Value   Wells' Criteria for PE    Clinical signs and symptoms of DVT 0 Filed at: 05/05/2024 1739   PE is primary diagnosis or equally likely 0 Filed at: 05/05/2024 1739   HR >100 0 Filed at: 05/05/2024 1739   Immobilization at least 3 days or Surgery in the previous 4 weeks 0 Filed at: 05/05/2024 1739   Previous, objectively diagnosed PE or DVT 0 Filed at: 05/05/2024 1739   Hemoptysis 0 Filed at: 05/05/2024 1739   Malignancy with treatment within 6 months or palliative 0 Filed at: 05/05/2024 1739   Wells' Criteria Total 0 Filed at: 05/05/2024  1739                  Medical Decision Making  53-year-old female presenting for evaluation of chest pain and shortness of breath.  Vital signs stable on arrival.  Overall benign examination.  Differential diagnoses include but not limited to ACS, pneumonia, pneumothorax, PE, asthma exacerbation, bronchitis.  Labs overall unremarkable including D-dimer and troponin.  Chest x-ray unremarkable.  EKG within normal limits.  Patient treated symptomatically with neb treatment.  Will place on steroids for a few days.  She is otherwise stable for discharge at this time.  Advised follow-up with PCP.  Return precautions discussed.    Problems Addressed:  Chest pain: acute illness or injury  Shortness of breath: acute illness or injury    Amount and/or Complexity of Data Reviewed  Labs: ordered. Decision-making details documented in ED Course.  Radiology: ordered and independent interpretation performed.  ECG/medicine tests: ordered and independent interpretation performed. Decision-making details documented in ED Course.    Risk  Prescription drug management.             Disposition  Final diagnoses:   Shortness of breath   Chest pain     Time reflects when diagnosis was documented in both MDM as applicable and the Disposition within this note       Time User Action Codes Description Comment    5/5/2024  6:12 PM Alyssa Bo Add [R06.02] Shortness of breath     5/5/2024  6:12 PM Alyssa Bo Add [R07.9] Chest pain           ED Disposition       ED Disposition   Discharge    Condition   Stable    Date/Time   Sun May 5, 2024 1811    Comment   Hyacinth Parra discharge to home/self care.                   Follow-up Information    None         Patient's Medications   Discharge Prescriptions    PREDNISONE 20 MG TABLET    Take 2 tablets (40 mg total) by mouth daily for 5 days       Start Date: 5/5/2024  End Date: 5/10/2024       Order Dose: 40 mg       Quantity: 10 tablet    Refills: 0       No discharge procedures on  file.    PDMP Review         Value Time User    PDMP Reviewed  Yes 9/23/2022  9:32 AM Jazzmine Maldonado DO            ED Provider  Electronically Signed by             Alyssa Bo MD  05/05/24 3460

## 2024-05-06 ENCOUNTER — TELEPHONE (OUTPATIENT)
Dept: INTERNAL MEDICINE CLINIC | Facility: CLINIC | Age: 54
End: 2024-05-06

## 2024-05-06 ENCOUNTER — TELEPHONE (OUTPATIENT)
Age: 54
End: 2024-05-06

## 2024-05-06 DIAGNOSIS — Z76.0 MEDICATION REFILL: ICD-10-CM

## 2024-05-06 NOTE — TELEPHONE ENCOUNTER
Patient called the RX Refill Line.  Message is being forwarded to the office.     Patient is requesting   A call back about her Janumet.    The pharmacy saw that she had been taking metformin and now that she was being prescribed the Janumet that she should stop the Metformin.     Please contact patient at   [phone]

## 2024-05-06 NOTE — TELEPHONE ENCOUNTER
Patient advised to schedule follow up appointment.  She was also informed to stop taking metformin and take janumet in its place

## 2024-05-06 NOTE — TELEPHONE ENCOUNTER
Reason for call:   [x] Refill   [] Prior Auth  [] Other:     Office:   [x] PCP/Provider - Alyssa Bo MD   [] Specialty/Provider -     Medication:  Lantus SoloStar 100 units/mL SOPN    Inject 0.28 mL (28 Units total) under the skin every morning for 7 days     1.96 mL      BD Pen Needle Maile 2nd Gen 32G X 4 MM MISC   Inject under the skin in the morning Use as directed   30    Pharmacy: [id number] [address]     Does the patient have enough for 3 days?   [] Yes   [x] No - Send as HP to POD

## 2024-05-06 NOTE — TELEPHONE ENCOUNTER
Patient called the RX Refill Line. Message is being forwarded to the office.     Patient is requesting   A script for a nebulizer machine. Patient stated that when she was in the hospital last, she was having breathing issues and they had her use a nebulizer machine. Patient stated that the treatments worked well and she would like one to use at home.    Patient is also requesting an appointment for getting her lungs checked and looked at.    Please contact patient at   [phone]

## 2024-05-07 RX ORDER — PEN NEEDLE, DIABETIC 32GX 5/32"
NEEDLE, DISPOSABLE MISCELLANEOUS DAILY
Qty: 30 EACH | Refills: 0 | Status: SHIPPED | OUTPATIENT
Start: 2024-05-07 | End: 2024-06-06

## 2024-05-07 RX ORDER — INSULIN GLARGINE 100 [IU]/ML
28 INJECTION, SOLUTION SUBCUTANEOUS EVERY MORNING
Qty: 1.96 ML | Refills: 0 | Status: SHIPPED | OUTPATIENT
Start: 2024-05-07 | End: 2024-05-14

## 2024-05-21 ENCOUNTER — HOSPITAL ENCOUNTER (OUTPATIENT)
Dept: CT IMAGING | Facility: HOSPITAL | Age: 54
Discharge: HOME | End: 2024-05-21
Payer: COMMERCIAL

## 2024-05-21 DIAGNOSIS — F17.210 SMOKING GREATER THAN 20 PACK YEARS: ICD-10-CM

## 2024-05-21 PROCEDURE — 71271 CT THORAX LUNG CANCER SCR C-: CPT

## 2024-05-26 PROBLEM — Z12.39 BREAST CANCER SCREENING: Status: RESOLVED | Noted: 2024-04-26 | Resolved: 2024-05-26

## 2024-05-31 ENCOUNTER — TELEPHONE (OUTPATIENT)
Age: 54
End: 2024-05-31

## 2024-05-31 DIAGNOSIS — Z76.0 MEDICATION REFILL: ICD-10-CM

## 2024-05-31 DIAGNOSIS — E11.9 TYPE 2 DIABETES MELLITUS WITHOUT COMPLICATION, UNSPECIFIED WHETHER LONG TERM INSULIN USE (HCC): Primary | ICD-10-CM

## 2024-05-31 RX ORDER — INSULIN GLARGINE 100 [IU]/ML
28 INJECTION, SOLUTION SUBCUTANEOUS
Qty: 10 ML | Refills: 0 | Status: SHIPPED | OUTPATIENT
Start: 2024-05-31 | End: 2024-06-30
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
## 2024-06-03 ENCOUNTER — TELEPHONE (OUTPATIENT)
Dept: CCU | Facility: HOSPITAL | Age: 54
End: 2024-06-03

## 2024-06-07 DIAGNOSIS — E78.5 HYPERLIPIDEMIA, UNSPECIFIED HYPERLIPIDEMIA TYPE: ICD-10-CM

## 2024-06-07 DIAGNOSIS — Z76.0 MEDICATION REFILL: ICD-10-CM

## 2024-06-07 RX ORDER — PEN NEEDLE, DIABETIC 32GX 5/32"
NEEDLE, DISPOSABLE MISCELLANEOUS DAILY
Qty: 30 EACH | Refills: 0 | Status: SHIPPED | OUTPATIENT
Start: 2024-06-07 | End: 2024-06-10

## 2024-06-07 RX ORDER — ROSUVASTATIN CALCIUM 20 MG/1
20 TABLET, COATED ORAL DAILY
Qty: 30 TABLET | Refills: 0 | Status: SHIPPED | OUTPATIENT
Start: 2024-06-07 | End: 2024-06-10

## 2024-06-10 RX ORDER — PEN NEEDLE, DIABETIC 32GX 5/32"
NEEDLE, DISPOSABLE MISCELLANEOUS DAILY
Qty: 30 EACH | Refills: 0 | Status: SHIPPED | OUTPATIENT
Start: 2024-06-10 | End: 2024-07-10

## 2024-06-10 RX ORDER — ROSUVASTATIN CALCIUM 20 MG/1
20 TABLET, COATED ORAL DAILY
Qty: 30 TABLET | Refills: 0 | Status: SHIPPED | OUTPATIENT
Start: 2024-06-10 | End: 2024-09-08

## 2024-06-21 PROBLEM — R07.9 CHEST PAIN: Status: ACTIVE | Noted: 2024-06-21

## 2024-06-26 ENCOUNTER — NURSE TRIAGE (OUTPATIENT)
Age: 54
End: 2024-06-26

## 2024-06-26 NOTE — TELEPHONE ENCOUNTER
"Patient reports fishy odor that is not associated with any vaginal discharge or itching. Patient states she has increased insulin and also been using perfumed body wash. Denies fever,pain, urinary changes  or any other symptoms. Patient states she and partner use condoms but she is not sure if she should be tested for something.  Offered Appointment for tomorrow, patient declined but scheduled Friday.  Reviewed to use only plain non-perfumed soaps. Call back for vaginal discharge, bleeding, fever or other worsening symptoms. Patient verbalized understanding, no questions.      Reason for Disposition   Bad smelling vaginal discharge    Additional Information   Negative: Patient wants to be seen    Answer Assessment - Initial Assessment Questions  1. DISCHARGE: \"Describe the discharge.\" (e.g., white, yellow, green, gray, foamy, cottage cheese-like)      No vaginal discharge   2. ODOR: \"Is there a bad odor?\"      Yes, fishy smell  3. ONSET: \"When did the discharge begin?\"      denies  4. RASH: \"Is there a rash in that area?\" If Yes, ask: \"Describe it.\" (e.g., redness, blisters, sores, bumps)      denies  5. ABDOMINAL PAIN: \"Are you having any abdominal pain?\" If Yes, ask: \"What does it feel like? \" (e.g., crampy, dull, intermittent, constant)       denies  6. ABDOMINAL PAIN SEVERITY: If present, ask: \"How bad is it?\"  (e.g., mild, moderate, severe)   - MILD - doesn't interfere with normal activities    - MODERATE - interferes with normal activities or awakens from sleep    - SEVERE - patient doesn't want to move (R/O peritonitis)       denies  7. CAUSE: \"What do you think is causing the odor?\" \"Have you had the same problem before? What happened then?\"  Took medication         8. OTHER SYMPTOMS: \"Do you have any other symptoms?\" (e.g., fever, itching, vaginal bleeding, pain with urination, injury to genital area, vaginal foreign body)      denies  9. PREGNANCY: \"Is there any chance you are pregnant?\" \"When was your " "last menstrual period?\"      LMP 2 years ago    Protocols used: Vaginal Discharge-ADULT-[place]    "

## 2024-07-01 ENCOUNTER — HOSPITAL ENCOUNTER (EMERGENCY)
Facility: HOSPITAL | Age: 54
Discharge: HOME | End: 2024-07-01
Attending: EMERGENCY MEDICINE
Payer: COMMERCIAL

## 2024-07-01 ENCOUNTER — TELEPHONE (OUTPATIENT)
Dept: INTERNAL MEDICINE CLINIC | Facility: CLINIC | Age: 54
End: 2024-07-01

## 2024-07-01 VITALS
DIASTOLIC BLOOD PRESSURE: 75 MMHG | OXYGEN SATURATION: 99 % | SYSTOLIC BLOOD PRESSURE: 138 MMHG | HEART RATE: 96 BPM | RESPIRATION RATE: 16 BRPM

## 2024-07-01 DIAGNOSIS — R10.11 RUQ ABDOMINAL PAIN: Primary | ICD-10-CM

## 2024-07-01 LAB
BACTERIA UR QL AUTO: NORMAL /HPF
BILIRUB UR QL STRIP: NEGATIVE
CLARITY UR: CLEAR
COLOR UR: YELLOW
GLUCOSE UR STRIP-MCNC: NEGATIVE MG/DL
HGB UR QL STRIP.AUTO: ABNORMAL
KETONES UR STRIP-MCNC: NEGATIVE MG/DL
LEUKOCYTE ESTERASE UR QL STRIP: ABNORMAL
NITRITE UR QL STRIP: NEGATIVE
NON-SQ EPI CELLS URNS QL MICRO: NORMAL /HPF
PH UR STRIP.AUTO: 6 [PH]
PROT UR STRIP-MCNC: NEGATIVE MG/DL
RBC #/AREA URNS AUTO: NORMAL /HPF
SP GR UR STRIP.AUTO: 1.02 (ref 1–1.03)
UROBILINOGEN UR QL STRIP.AUTO: 0.2 E.U./DL
WBC #/AREA URNS AUTO: NORMAL /HPF

## 2024-07-01 PROCEDURE — 99284 EMERGENCY DEPT VISIT MOD MDM: CPT

## 2024-07-01 PROCEDURE — 99284 EMERGENCY DEPT VISIT MOD MDM: CPT | Performed by: EMERGENCY MEDICINE

## 2024-07-01 PROCEDURE — 81001 URINALYSIS AUTO W/SCOPE: CPT
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
## 2024-07-01 NOTE — ED NOTES
Pt requesting refills of the following medications: Lantus PEN (not vial per pt), 29 units every AM. Metformin 500mg daily, rouvastatin 20mg daily.     Angeline Berg RN  07/01/24 5800

## 2024-07-02 ENCOUNTER — TELEPHONE (OUTPATIENT)
Age: 54
End: 2024-07-02

## 2024-07-02 ENCOUNTER — NURSE TRIAGE (OUTPATIENT)
Age: 54
End: 2024-07-02

## 2024-07-02 DIAGNOSIS — B96.89 BV (BACTERIAL VAGINOSIS): Primary | ICD-10-CM

## 2024-07-02 DIAGNOSIS — N76.0 BV (BACTERIAL VAGINOSIS): Primary | ICD-10-CM

## 2024-07-02 RX ORDER — METRONIDAZOLE 500 MG/1
500 TABLET ORAL 2 TIMES DAILY
Qty: 14 TABLET | Refills: 0 | Status: SHIPPED | OUTPATIENT
Start: 2024-07-02 | End: 2024-07-09

## 2024-07-02 NOTE — TELEPHONE ENCOUNTER
"Patient calling to report fishy vaginal odor with clear watery discharge. Denies all other symptoms. Has history of BV. Reviewed will order metrogel but patient declines and requests oral Flagyl. Medication ordered to patient's pharmacy per protocol.     If patient has history of BV in prior two years, prescribe:    -Metrogel Intravaginally x 5 nights, with no refills.    If patient refuses the intravaginal medication and is requesting a PO medication, prescribe:    -Flagyl 500mg BID PO x 7 days, with no refills    Medication instructions:  Please instruct patient that they cannot drink alcohol while on Flagyl.    Please instruct patient that they should not have sex while on treatment or 3 days after if using Metrogel.    If patient is having recurrent BV infections, please schedule appointment (should be seen within 5 days).       Reason for Disposition   All other vaginal symptoms (Exception: feels like prior yeast infection, minor abrasion, mild rash < 24 hour duration, mild itching)    Answer Assessment - Initial Assessment Questions  1. SYMPTOM: \"What's the main symptom you're concerned about?\" (e.g., pain, itching, dryness)      Fishy odor, clear watery discharge  2. LOCATION: \"Where is the  s/s located?\" (e.g., inside/outside, left/right)      vagina  3. ONSET: \"When did the  s/s  start?\"      1 week  4. PAIN: \"Is there any pain?\" If Yes, ask: \"How bad is it?\" (Scale: 1-10; mild, moderate, severe)      Abdominal pain - ED work up - no pelvic or vaginal pain  5. ITCHING: \"Is there any itching?\" If Yes, ask: \"How bad is it?\" (Scale: 1-10; mild, moderate, severe)      Denies  6. CAUSE: \"What do you think is causing the discharge?\" \"Have you had the same problem before? What happened then?\"      Possible  7. OTHER SYMPTOMS: \"Do you have any other symptoms?\" (e.g., fever, itching, vaginal bleeding, pain with urination, injury to genital area, vaginal foreign body)      Denies fever, body aches, chills, VB, " "urinary concerns  8. PREGNANCY: \"Is there any chance you are pregnant?\" \"When was your last menstrual period?\"      Denies - Postmenopausal    Protocols used: Vaginal Symptoms-ADULT-[place]    "

## 2024-07-11 ENCOUNTER — HOSPITAL ENCOUNTER (EMERGENCY)
Facility: HOSPITAL | Age: 54
Discharge: HOME | End: 2024-07-11
Attending: EMERGENCY MEDICINE
Payer: COMMERCIAL

## 2024-07-11 VITALS
SYSTOLIC BLOOD PRESSURE: 125 MMHG | TEMPERATURE: 98.3 F | DIASTOLIC BLOOD PRESSURE: 77 MMHG | OXYGEN SATURATION: 97 % | HEART RATE: 87 BPM | RESPIRATION RATE: 16 BRPM

## 2024-07-11 DIAGNOSIS — Z76.0 MEDICATION REFILL: ICD-10-CM

## 2024-07-11 DIAGNOSIS — E11.9 TYPE 2 DIABETES MELLITUS WITHOUT COMPLICATION, UNSPECIFIED WHETHER LONG TERM INSULIN USE (HCC): ICD-10-CM

## 2024-07-11 DIAGNOSIS — Z76.0 ENCOUNTER FOR MEDICATION REFILL: Primary | ICD-10-CM

## 2024-07-11 DIAGNOSIS — I10 HYPERTENSION, UNSPECIFIED TYPE: ICD-10-CM

## 2024-07-11 DIAGNOSIS — E78.5 HYPERLIPIDEMIA, UNSPECIFIED HYPERLIPIDEMIA TYPE: ICD-10-CM

## 2024-07-11 LAB — GLUCOSE SERPL-MCNC: 99 MG/DL (ref 65–140)

## 2024-07-11 PROCEDURE — 99284 EMERGENCY DEPT VISIT MOD MDM: CPT | Performed by: PHYSICIAN ASSISTANT

## 2024-07-11 PROCEDURE — 99281 EMR DPT VST MAYX REQ PHY/QHP: CPT

## 2024-07-11 PROCEDURE — 82948 REAGENT STRIP/BLOOD GLUCOSE: CPT

## 2024-07-11 RX ORDER — ROSUVASTATIN CALCIUM 20 MG/1
20 TABLET, COATED ORAL DAILY
Qty: 30 TABLET | Refills: 0 | Status: SHIPPED | OUTPATIENT
Start: 2024-07-11 | End: 2024-07-17 | Stop reason: SDUPTHER

## 2024-07-11 RX ORDER — RAMIPRIL 2.5 MG/1
2.5 CAPSULE ORAL DAILY
Qty: 30 CAPSULE | Refills: 0 | Status: SHIPPED | OUTPATIENT
Start: 2024-07-11 | End: 2024-08-10

## 2024-07-11 RX ORDER — INSULIN GLARGINE 100 [IU]/ML
28 INJECTION, SOLUTION SUBCUTANEOUS
Qty: 10 ML | Refills: 0 | Status: SHIPPED | OUTPATIENT
Start: 2024-07-11 | End: 2024-07-17

## 2024-07-11 RX ORDER — QUETIAPINE FUMARATE 400 MG/1
400 TABLET, FILM COATED ORAL
Qty: 30 TABLET | Refills: 0 | Status: SHIPPED | OUTPATIENT
Start: 2024-07-11 | End: 2024-07-17 | Stop reason: SDUPTHER

## 2024-07-11 RX ORDER — PEN NEEDLE, DIABETIC 32GX 5/32"
NEEDLE, DISPOSABLE MISCELLANEOUS DAILY
Qty: 30 EACH | Refills: 0 | Status: SHIPPED | OUTPATIENT
Start: 2024-07-11 | End: 2024-08-10

## 2024-07-11 RX ORDER — SITAGLIPTIN AND METFORMIN HYDROCHLORIDE 1000; 50 MG/1; MG/1
1 TABLET, FILM COATED, EXTENDED RELEASE ORAL
Qty: 30 TABLET | Refills: 0 | Status: SHIPPED | OUTPATIENT
Start: 2024-07-11 | End: 2024-07-17

## 2024-07-12 NOTE — ED PROVIDER NOTES
History  Chief Complaint   Patient presents with    Medication Refill     Pt reports needing refills on Seroquel, cholesterol, HTN, insulin pens, metformin.      54-year-old female presenting to the emergency department today for medication refill.  The patient notes she has a follow-up appoint with her PCP this upcoming week.  She is requesting refills on insulin, insulin needles, Crestor, ramipril, Seroquel, and Janumet.  She notes she has mostly run out of these medications.  She has been thus far using her insulin as prescribed.  She denies any chest pain or shortness of breath.  No dizziness or lightheadedness.  No fevers or chills.  She has no other complaints at this time.      History provided by:  Patient   used: No    Medication Refill  Medications/supplies requested:  As above  Reason for request:  Medications ran out  Medications taken before: yes - see home medications        Prior to Admission Medications   Prescriptions Last Dose Informant Patient Reported? Taking?   ALPRAZolam (XANAX) 0.5 mg tablet   No No   Sig: Take 1 tablet (0.5 mg total) by mouth daily at bedtime as needed for sleep or anxiety   BD Pen Needle Maile 2nd Gen 32G X 4 MM MISC   No No   Sig: Inject under the skin in the morning Use as directed   BD Pen Needle Maile 2nd Gen 32G X 4 MM MISC   No Yes   Sig: Inject under the skin in the morning Use as directed   Blood Glucose Monitoring Suppl (OneTouch Verio Flex System) w/Device KIT   Yes No   Sig: Use as directed   Blood Glucose Monitoring Suppl (OneTouch Verio Reflect) w/Device KIT   No No   Sig: Check blood sugars three times daily. Please substitute with appropriate alternative as covered by patient's insurance. Dx: E11.65   Patient not taking: Reported on 2024   Cholecalciferol (Vitamin D3) 50 MCG ( UT) TABS   No No   Sig: Take 1 tablet (2,000 Units total) by mouth daily   Lancets (OneTouch Delica Plus [id number]) MISC   Yes No   Si (four) times a day  Test   Multiple Vitamins-Minerals (ONE-A-DAY FOR HER VITACRAVES PO)   Yes No   Sig: Take by mouth   OneTouch Delica Lancets 33G MISC   No No   Sig: Check blood sugars three times daily. Please substitute with appropriate alternative as covered by patient's insurance. Dx: E11.65   PARoxetine (PAXIL) 30 mg tablet   Yes No   Sig: Take 30 mg by mouth daily   Patient not taking: Reported on 12/1/2023   QUEtiapine (SEROquel) 400 MG tablet   No No   Sig: TAKE 1 TABLET (400 MG TOTAL) BY MOUTH DAILY AT BEDTIME   QUEtiapine (SEROquel) 400 MG tablet   No Yes   Sig: Take 1 tablet (400 mg total) by mouth daily at bedtime   SITagliptin-metFORMIN HCl ER (Janumet XR)  MG TB24   No No   Sig: Take 1 tablet by mouth daily with dinner   SITagliptin-metFORMIN HCl ER (Janumet XR)  MG TB24   No Yes   Sig: Take 1 tablet by mouth daily with dinner   aspirin (ECOTRIN LOW STRENGTH) 81 mg EC tablet   No No   Sig: Take 1 tablet (81 mg total) by mouth daily   clotrimazole-betamethasone (LOTRISONE) 1-0.05 % cream   No No   Sig: Apply topically 2 (two) times a day for 7 days To affected areas of vulva for itching.   gabapentin (NEURONTIN) 300 mg capsule   Yes No   Sig: Take 300 mg by mouth 2 (two) times a day as needed   Patient not taking: Reported on 12/1/2023   glucose blood (OneTouch Verio) test strip   No No   Sig: Check blood sugars three times daily. Please substitute with appropriate alternative as covered by patient's insurance. Dx: E11.65   insulin glargine (Lantus) 100 units/mL subcutaneous injection   No No   Sig: Inject 28 Units under the skin daily at bedtime   insulin glargine (Lantus) 100 units/mL subcutaneous injection   No Yes   Sig: Inject 28 Units under the skin daily at bedtime   nicotine (NICODERM CQ) 21 mg/24 hr TD 24 hr patch   Yes No   Patient not taking: Reported on 12/1/2023   ramipril (ALTACE) 2.5 mg capsule   No No   Sig: Take 1 capsule (2.5 mg total) by mouth daily   ramipril (ALTACE) 2.5 mg capsule   No  Yes   Sig: Take 1 capsule (2.5 mg total) by mouth daily   rosuvastatin (CRESTOR) 20 MG tablet   No No   Sig: Take 1 tablet (20 mg total) by mouth daily   rosuvastatin (CRESTOR) 20 MG tablet   No Yes   Sig: Take 1 tablet (20 mg total) by mouth daily   valACYclovir (VALTREX) 500 mg tablet   No No   Sig: Take 1 tablet (500 mg total) by mouth 2 (two) times a day for 3 days   Patient not taking: Reported on 4/26/2024      Facility-Administered Medications: None       Past Medical History:   Diagnosis Date    Asthma     Depression     Diabetes mellitus (HCC)     type 2    Hyperlipidemia     Hypertension        Past Surgical History:   Procedure Laterality Date    KNEE ARTHROCENTESIS      MOUTH SURGERY         Family History   Problem Relation Age of Onset    Diabetes Mother     Diabetes Father     Ovarian cancer Sister     Colon cancer Brother      I have reviewed and agree with the history as documented.    E-Cigarette/Vaping    E-Cigarette Use Never User      E-Cigarette/Vaping Substances    THC No     CBD No      Social History     Tobacco Use    Smoking status: Every Day     Current packs/day: 1.00     Average packs/day: 1 pack/day for 35.0 years (35.0 ttl pk-yrs)     Types: Cigarettes    Smokeless tobacco: Never   Vaping Use    Vaping status: Never Used   Substance Use Topics    Alcohol use: Not Currently    Drug use: Never       Review of Systems   Constitutional:  Negative for appetite change, chills, diaphoresis, fatigue and fever.   Eyes:  Negative for visual disturbance.   Respiratory:  Negative for cough, chest tightness, shortness of breath and wheezing.    Cardiovascular:  Negative for chest pain, palpitations and leg swelling.   Gastrointestinal:  Negative for abdominal pain, constipation, diarrhea, nausea and vomiting.   Musculoskeletal:  Negative for neck pain and neck stiffness.   Skin:  Negative for rash and wound.   Neurological:  Negative for dizziness, seizures, syncope, weakness, light-headedness,  numbness and headaches.   Psychiatric/Behavioral:  Negative for confusion.    All other systems reviewed and are negative.      Physical Exam  Constitutional: Vital signs reviewed, patient well-appearing, nontoxic  Eyes: Extraocular movements intact   HEENT: Trachea midline, no JVD, moist mucous membranes   Respiratory: Equal chest expansion   Cardiovascular: Well perfused   Abdomen: No distension   Extremities: No edema   Neuro: awake, alert, oriented, no focal weakness   Skin: warm, dry, intact, no rashes noted     Vital Signs  ED Triage Vitals [07/11/24 1946]   Temperature Pulse Respirations Blood Pressure SpO2   98.3 °F (36.8 °C) 87 16 125/77 97 %      Temp Source Heart Rate Source Patient Position - Orthostatic VS BP Location FiO2 (%)   Oral Monitor Sitting Left arm --      Pain Score       --           Vitals:    07/11/24 1946   BP: 125/77   Pulse: 87   Patient Position - Orthostatic VS: Sitting         Visual Acuity      ED Medications  Medications - No data to display    Diagnostic Studies  Results Reviewed       Procedure Component Value Units Date/Time    Fingerstick Glucose (POCT) [[id number]]  (Normal) Collected: 07/11/24 2017    Lab Status: Final result Specimen: Blood Updated: 07/11/24 2018     POC Glucose 99 mg/dl                    No orders to display              Procedures  Procedures         ED Course  ED Course as of 07/12/24 0804   Thu Jul 11, 2024 2021 I called the patient's pharmacy to confirm dose of Lantus.  They were able to confirm this for me.                                               Medical Decision Making  54-year-old female presenting to the emergency department today for medication refill.  Has an appointment with her PCP this upcoming week.  Vitals are stable.  Afebrile.  Physical examination is reassuring.  Fingerstick glucose is 99.  I called the patient's pharmacy to confirm dosing of Seroquel, ramipril, insulin, Janumet, and Crestor.  They were able to confirm these doses.  " The patient is stable for discharge at this time.  Will send refill to the patient's pharmacy.  Follow-up outpatient with PCP.  Strict return precautions were given.  Recommend PCP follow-up as soon as possible. The patient and/or patient's proxy verify their understanding and agree to the plan at this time.  All questions answered to the patient and/or their proxy's satisfaction.  All labs reviewed and utilized in the medical decision making process (if labs were ordered).  Portions of the record may have been created with voice recognition software.  Occasional wrong word or \"sound a like\" substitutions may have occurred due to the inherent limitations of voice recognition software.  Read the chart carefully and recognize, using context, where substitutions have occurred.    I reviewed prior notes.    Problems Addressed:  Encounter for medication refill: undiagnosed new problem with uncertain prognosis    Amount and/or Complexity of Data Reviewed  External Data Reviewed: notes.  Labs: ordered. Decision-making details documented in ED Course.    Risk  OTC drugs.  Prescription drug management.                 Disposition  Final diagnoses:   Encounter for medication refill     Time reflects when diagnosis was documented in both MDM as applicable and the Disposition within this note       Time User Action Codes Description Comment    7/11/2024  7:55 PM Pawan Gonzalez [Z76.0] Encounter for medication refill     7/11/2024  7:56 PM Pawan Gonzalez Add [E11.9] Type 2 diabetes mellitus without complication, unspecified whether long term insulin use (HCC)     7/11/2024  8:00 PM Pawan Gonzalez Add [E78.5] Hyperlipidemia, unspecified hyperlipidemia type     7/11/2024  8:00 PM Pawan Gonzalez Add [I10] Hypertension, unspecified type     7/11/2024  8:01 PM Pawan Gonzalez Modify [Z76.0] Encounter for medication refill     7/11/2024  8:01 PM Pawan Gonzalez Modify [E11.9] Type 2 " diabetes mellitus without complication, unspecified whether long term insulin use (HCC)     7/11/2024  8:01 PM Pawan Gonzalez Modify [E78.5] Hyperlipidemia, unspecified hyperlipidemia type     7/11/2024  8:01 PM Pawan Gonzalez Modify [I10] Hypertension, unspecified type     7/11/2024  8:09 PM Pawan Gonzalez Add [Z76.0] Medication refill     7/11/2024  8:12 PM Pawan Gonzalez Modify [Z76.0] Encounter for medication refill           ED Disposition       ED Disposition   Discharge    Condition   Stable    Date/Time   Thu Jul 11, 2024  8:12 PM    Comment   Hyacinth Parra discharge to home/self care.                   Follow-up Information       Follow up With Specialties Details Why Contact Info Additional Information    [facility] Emergency Department Emergency Medicine Go to  If symptoms worsen [address] [place] [id number] [phone] [facility] Emergency Department, [address] [place] [id number]    Your PCP  Call                Discharge Medication List as of 7/11/2024  8:19 PM        CONTINUE these medications which have CHANGED    Details   BD Pen Needle Maile 2nd Gen 32G X 4 MM MISC Inject under the skin in the morning Use as directed, Starting Thu 7/11/2024, Until Sat 8/10/2024, Normal      insulin glargine (Lantus) 100 units/mL subcutaneous injection Inject 28 Units under the skin daily at bedtime, Starting Thu 7/11/2024, Until Sat 8/10/2024, Normal      QUEtiapine (SEROquel) 400 MG tablet Take 1 tablet (400 mg total) by mouth daily at bedtime, Starting Thu 7/11/2024, Normal      ramipril (ALTACE) 2.5 mg capsule Take 1 capsule (2.5 mg total) by mouth daily, Starting Thu 7/11/2024, Until Sat 8/10/2024, Normal      rosuvastatin (CRESTOR) 20 MG tablet Take 1 tablet (20 mg total) by mouth daily, Starting Thu 7/11/2024, Until Wed 10/9/2024, Normal      SITagliptin-metFORMIN HCl ER (Janumet XR)  MG TB24 Take 1 tablet by  mouth daily with dinner, Starting Thu 7/11/2024, Until Sat 8/10/2024, Normal           CONTINUE these medications which have NOT CHANGED    Details   ALPRAZolam (XANAX) 0.5 mg tablet Take 1 tablet (0.5 mg total) by mouth daily at bedtime as needed for sleep or anxiety, Starting Fri 4/26/2024, Until Sun 5/26/2024 at 2359, Normal      aspirin (ECOTRIN LOW STRENGTH) 81 mg EC tablet Take 1 tablet (81 mg total) by mouth daily, Starting Thu 3/14/2024, Until Wed 6/12/2024, Normal      !! Blood Glucose Monitoring Suppl (OneTouch Verio Flex System) w/Device KIT Use as directed, Starting Mon 11/6/2023, Historical Med      !! Blood Glucose Monitoring Suppl (OneTouch Verio Reflect) w/Device KIT Check blood sugars three times daily. Please substitute with appropriate alternative as covered by patient's insurance. Dx: E11.65, Normal      Cholecalciferol (Vitamin D3) 50 MCG (2000 UT) TABS Take 1 tablet (2,000 Units total) by mouth daily, Starting Fri 4/26/2024, Until Thu 7/25/2024, Normal      clotrimazole-betamethasone (LOTRISONE) 1-0.05 % cream Apply topically 2 (two) times a day for 7 days To affected areas of vulva for itching., Starting Fri 12/1/2023, Until Fri 12/8/2023, Normal      gabapentin (NEURONTIN) 300 mg capsule Take 300 mg by mouth 2 (two) times a day as needed, Starting Thu 6/1/2023, Historical Med      glucose blood (OneTouch Verio) test strip Check blood sugars three times daily. Please substitute with appropriate alternative as covered by patient's insurance. Dx: E11.65, Normal      !! Lancets (OneTouch Delica Plus [id number]) MISC 4 (four) times a day Test, Starting Mon 11/6/2023, Historical Med      Multiple Vitamins-Minerals (ONE-A-DAY FOR HER VITACRAVES PO) Take by mouth, Historical Med      nicotine (NICODERM CQ) 21 mg/24 hr TD 24 hr patch Starting Mon 5/8/2023, Historical Med      !! OneTouch Delica Lancets 33G MISC Check blood sugars three times daily. Please substitute with appropriate alternative as  covered by patient's insurance. Dx: E11.65, Normal      PARoxetine (PAXIL) 30 mg tablet Take 30 mg by mouth daily, Starting Sat 2/4/2023, Historical Med      valACYclovir (VALTREX) 500 mg tablet Take 1 tablet (500 mg total) by mouth 2 (two) times a day for 3 days, Starting Wed 1/24/2024, Until Mon 2/26/2024, Normal       !! - Potential duplicate medications found. Please discuss with provider.          No discharge procedures on file.    PDMP Review         Value Time User    PDMP Reviewed  Yes 9/23/2022  9:32 AM Jazzmine Maldonado DO            ED Provider  Electronically Signed by             Pawan Gonzalez PA-C  07/12/24 5927

## 2024-07-17 ENCOUNTER — OFFICE VISIT (OUTPATIENT)
Dept: INTERNAL MEDICINE CLINIC | Facility: CLINIC | Age: 54
End: 2024-07-17
Payer: COMMERCIAL

## 2024-07-17 VITALS
WEIGHT: 158 LBS | DIASTOLIC BLOOD PRESSURE: 80 MMHG | TEMPERATURE: 97.8 F | HEIGHT: 66 IN | RESPIRATION RATE: 16 BRPM | OXYGEN SATURATION: 98 % | SYSTOLIC BLOOD PRESSURE: 120 MMHG | BODY MASS INDEX: 25.39 KG/M2 | HEART RATE: 88 BPM

## 2024-07-17 DIAGNOSIS — Z76.0 ENCOUNTER FOR MEDICATION REFILL: ICD-10-CM

## 2024-07-17 DIAGNOSIS — E11.9 TYPE 2 DIABETES MELLITUS WITHOUT COMPLICATION, UNSPECIFIED WHETHER LONG TERM INSULIN USE (HCC): Primary | ICD-10-CM

## 2024-07-17 DIAGNOSIS — F31.9 BIPOLAR AFFECTIVE DISORDER, REMISSION STATUS UNSPECIFIED (HCC): ICD-10-CM

## 2024-07-17 DIAGNOSIS — E78.5 HYPERLIPIDEMIA, UNSPECIFIED HYPERLIPIDEMIA TYPE: ICD-10-CM

## 2024-07-17 DIAGNOSIS — I10 PRIMARY HYPERTENSION: ICD-10-CM

## 2024-07-17 PROCEDURE — 99214 OFFICE O/P EST MOD 30 MIN: CPT

## 2024-07-17 RX ORDER — ROSUVASTATIN CALCIUM 20 MG/1
20 TABLET, COATED ORAL DAILY
Qty: 30 TABLET | Refills: 2 | Status: SHIPPED | OUTPATIENT
Start: 2024-07-17 | End: 2024-10-15

## 2024-07-17 RX ORDER — INSULIN GLARGINE 100 [IU]/ML
28 INJECTION, SOLUTION SUBCUTANEOUS
Status: DISCONTINUED | OUTPATIENT
Start: 2024-07-17 | End: 2024-07-18

## 2024-07-17 RX ORDER — ASPIRIN 81 MG/1
81 TABLET ORAL DAILY
Qty: 30 TABLET | Refills: 2 | Status: SHIPPED | OUTPATIENT
Start: 2024-07-17 | End: 2024-10-15

## 2024-07-17 RX ORDER — LANCETS 33 GAUGE
EACH MISCELLANEOUS
Qty: 300 EACH | Refills: 3 | Status: SHIPPED | OUTPATIENT
Start: 2024-07-17

## 2024-07-17 RX ORDER — BLOOD-GLUCOSE METER
EACH MISCELLANEOUS
Qty: 1 KIT | Refills: 0 | Status: SHIPPED | OUTPATIENT
Start: 2024-07-17

## 2024-07-17 RX ORDER — BLOOD-GLUCOSE METER
EACH MISCELLANEOUS
Qty: 1 KIT | Refills: 0 | Status: SHIPPED | OUTPATIENT
Start: 2024-07-17 | End: 2024-07-17

## 2024-07-17 RX ORDER — QUETIAPINE FUMARATE 400 MG/1
400 TABLET, FILM COATED ORAL
Qty: 30 TABLET | Refills: 0 | Status: SHIPPED | OUTPATIENT
Start: 2024-07-17

## 2024-07-17 RX ORDER — PEN NEEDLE, DIABETIC 32GX 5/32"
NEEDLE, DISPOSABLE MISCELLANEOUS
Qty: 100 EACH | Refills: 3 | Status: SHIPPED | OUTPATIENT
Start: 2024-07-17

## 2024-07-17 RX ORDER — BLOOD SUGAR DIAGNOSTIC
STRIP MISCELLANEOUS
Qty: 300 EACH | Refills: 3 | Status: SHIPPED | OUTPATIENT
Start: 2024-07-17

## 2024-07-18 NOTE — PROGRESS NOTES
Ambulatory Visit  Name: Hyacinth Parra      : 1970      MRN: [id number]  Encounter Provider: Michele Knight MD  Encounter Date: 2024   Encounter department: [facility] INTERNAL MEDICINE [place]    Assessment & Plan   1. 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  Nonetheless she wants to change back to her metformin 1000 mg daily. Refills sent.   Foot exam in 3 months at annual physical  Orders:  -     Albumin / creatinine urine ratio; Future; Expected date: 2024  -     Comprehensive metabolic panel; Future; Expected date: 2024  -     Hemoglobin A1C; Future; Expected date: 2024  -     metFORMIN (GLUCOPHAGE) 1000 MG tablet; Take 1 tablet (1,000 mg total) by mouth daily Stopped Janumet and started back on Metformin  -     Insulin Glargine Solostar SOPN 28 Units  -     Comprehensive metabolic panel  -     Ambulatory Referral to Diabetic Education; Future  -     Blood Glucose Monitoring Suppl (ONE TOUCH ULTRA 2) w/Device KIT; Check blood sugars three times daily. Please substitute with appropriate alternative as covered by patient's insurance. Dx: E11.65  -     glucose blood (OneTouch Ultra) test strip; Check blood sugars three times daily. Please substitute with appropriate alternative as covered by patient's insurance. Dx: E11.65  -     OneTouch Delica Lancets 33G MISC; Check blood sugars three times daily. Please substitute with appropriate alternative as covered by patient's insurance. Dx: E11.65  -     Insulin Pen Needle (BD Pen Needle Maile U/F) 32G X 4 MM MISC; Use daily as directed with insulin pen  2.  "Hyperlipidemia, unspecified hyperlipidemia type  Assessment & Plan:  Maintained on crestor 20 mg daily, questionable if patient is compliant due to confusion over refill process  No new lipid panel available  Refill of crestor 20 mg sent to pharmacy  Repeat lipid panel in 3 months.  Orders:  -     Lipid Panel with Direct LDL reflex; Future  -     Lipid Panel with Direct LDL reflex  3. Encounter for medication refill  -     rosuvastatin (CRESTOR) 20 MG tablet; Take 1 tablet (20 mg total) by mouth daily  -     QUEtiapine (SEROquel) 400 MG tablet; Take 1 tablet (400 mg total) by mouth daily at bedtime  4. Primary hypertension  -     aspirin (ECOTRIN LOW STRENGTH) 81 mg EC tablet; Take 1 tablet (81 mg total) by mouth daily  5. Bipolar affective disorder, remission status unspecified (HCC)  Assessment & Plan:  Continue seroquel 400 mg qhs and xanax 0.5 mg qhs prn  Compliance with medications imperative   Patient encouraged to get on waiting list with psyche services again, referral was made at last visit but patient has yet to call.  She is reluctant to start counseling again.         History of Present Illness     Hyacinth presents to the clinic for a follow up visit and medication refills.  She was recently seen in the ED for medication refills due to the pharmacy being unable to renew her medications without being seen in the clinic.  Because of this, she has been noncompliant with her medication with the exception of seroquel and xanax for the last month.  She also requests to have her insulin changed to an injectable pen.  Patient mentions that she stopped taking the janumet we prescribed her at the last visit because it \"Made her blind\".  She wants to change back to her previous dose of metformin.      Review of Systems   Constitutional:  Negative for chills and fever.   HENT:  Negative for ear pain and sore throat.    Eyes:  Negative for pain and visual disturbance.   Respiratory:  Negative for cough and shortness " of breath.    Cardiovascular:  Negative for chest pain and palpitations.   Gastrointestinal:  Negative for abdominal pain and vomiting.   Genitourinary:  Negative for dysuria and hematuria.   Musculoskeletal:  Negative for arthralgias and back pain.   Skin:  Negative for color change and rash.   Neurological:  Negative for seizures and syncope.   All other systems reviewed and are negative.    Past Medical History:   Diagnosis Date    Asthma     Depression     Diabetes mellitus (HCC)     type 2    Hyperlipidemia     Hypertension      Past Surgical History:   Procedure Laterality Date    KNEE ARTHROCENTESIS      MOUTH SURGERY       Family History   Problem Relation Age of Onset    Diabetes Mother     Diabetes Father     Ovarian cancer Sister     Colon cancer Brother      Social History     Tobacco Use    Smoking status: Every Day     Current packs/day: 1.00     Average packs/day: 1 pack/day for 35.0 years (35.0 ttl pk-yrs)     Types: Cigarettes    Smokeless tobacco: Never   Vaping Use    Vaping status: Never Used   Substance and Sexual Activity    Alcohol use: Not Currently    Drug use: Never    Sexual activity: Yes     Partners: Male     Birth control/protection: None     Comment: last intercourse 2 months     Current Outpatient Medications on File Prior to Visit   Medication Sig    ALPRAZolam (XANAX) 0.5 mg tablet Take 1 tablet (0.5 mg total) by mouth daily at bedtime as needed for sleep or anxiety    BD Pen Needle Maile 2nd Gen 32G X 4 MM MISC Inject under the skin in the morning Use as directed    Blood Glucose Monitoring Suppl (OneTouch Verio Reflect) w/Device KIT Check blood sugars three times daily. Please substitute with appropriate alternative as covered by patient's insurance. Dx: E11.65    Cholecalciferol (Vitamin D3) 50 MCG (2000 UT) TABS Take 1 tablet (2,000 Units total) by mouth daily    glucose blood (OneTouch Verio) test strip Check blood sugars three times daily. Please substitute with appropriate  "alternative as covered by patient's insurance. Dx: E11.65    Multiple Vitamins-Minerals (ONE-A-DAY FOR HER VITACRAVES PO) Take by mouth    OneTouch Delica Lancets 33G MISC Check blood sugars three times daily. Please substitute with appropriate alternative as covered by patient's insurance. Dx: E11.65    clotrimazole-betamethasone (LOTRISONE) 1-0.05 % cream Apply topically 2 (two) times a day for 7 days To affected areas of vulva for itching.    gabapentin (NEURONTIN) 300 mg capsule Take 300 mg by mouth 2 (two) times a day as needed (Patient not taking: Reported on 12/1/2023)    Lancets (OneTouch Delica Plus [id number]) MISC 4 (four) times a day Test (Patient not taking: Reported on 7/17/2024)    nicotine (NICODERM CQ) 21 mg/24 hr TD 24 hr patch  (Patient not taking: Reported on 12/1/2023)    PARoxetine (PAXIL) 30 mg tablet Take 30 mg by mouth daily (Patient not taking: Reported on 12/1/2023)    ramipril (ALTACE) 2.5 mg capsule Take 1 capsule (2.5 mg total) by mouth daily (Patient not taking: Reported on 7/17/2024)    valACYclovir (VALTREX) 500 mg tablet Take 1 tablet (500 mg total) by mouth 2 (two) times a day for 3 days (Patient not taking: Reported on 4/26/2024)     No Known Allergies  Immunization History   Administered Date(s) Administered    Tdap 09/21/2021     Objective     /80 (BP Location: Left arm, Patient Position: Sitting, Cuff Size: Adult)   Pulse 88   Temp 97.8 °F (36.6 °C) (Tympanic)   Resp 16   Ht 5' 5.5\" (1.664 m)   Wt 71.7 kg (158 lb)   SpO2 98%   BMI 25.89 kg/m²     Physical Exam  Vitals and nursing note reviewed.   Constitutional:       General: She is not in acute distress.     Appearance: She is well-developed.   HENT:      Head: Normocephalic and atraumatic.   Eyes:      Conjunctiva/sclera: Conjunctivae normal.   Cardiovascular:      Rate and Rhythm: Normal rate and regular rhythm.      Heart sounds: No murmur heard.  Pulmonary:      Effort: Pulmonary effort is normal. No " respiratory distress.      Breath sounds: Normal breath sounds.   Abdominal:      Palpations: Abdomen is soft.      Tenderness: There is no abdominal tenderness.   Musculoskeletal:         General: No swelling.      Cervical back: Neck supple.   Skin:     General: Skin is warm and dry.      Capillary Refill: Capillary refill takes less than 2 seconds.   Neurological:      Mental Status: She is alert.   Psychiatric:         Mood and Affect: Mood normal.

## 2024-07-18 NOTE — ASSESSMENT & PLAN NOTE
Continue seroquel 400 mg qhs and xanax 0.5 mg qhs prn  Compliance with medications imperative   Patient encouraged to get on waiting list with psyche services again, referral was made at last visit but patient has yet to call.  She is reluctant to start counseling again.

## 2024-07-18 NOTE — ASSESSMENT & PLAN NOTE
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  Nonetheless she wants to change back to her metformin 1000 mg daily. Refills sent.   Foot exam in 3 months at annual physical

## 2024-07-18 NOTE — ASSESSMENT & PLAN NOTE
Maintained on crestor 20 mg daily, questionable if patient is compliant due to confusion over refill process  No new lipid panel available  Refill of crestor 20 mg sent to pharmacy  Repeat lipid panel in 3 months.

## 2024-07-24 ENCOUNTER — TELEPHONE (OUTPATIENT)
Age: 54
End: 2024-07-24

## 2024-07-24 NOTE — TELEPHONE ENCOUNTER
Patient had called in very concerned as she is having difficulties trying to find a rental doctor for her eyes, that takes [employer].     She has reached out to a few places and she is having difficulty. She was hoping the doctor would have recommendations of rental doctors that would be able to take [employer], and a referral to be seen.     Patient is getting worried as her vision is getting worse everyday, and she isnt sure why. Her sugar is fine, she is using new glasses that aren't working, she truly believes something is wrong, and can't take it anymore.     Patient just wants to be referred to a specialist to figure out what is going on with her vision getting worse.     Patient is also concerned has she had a lung scan completed, and the doctor stated they had found something, but wasn't sure what it was, and they would just do another scan next year. Patient would like to also be referred to a specialist for her lungs, as she wants to get the scan looked at, and to make sure it isnt anything serious.     Please advise back to patient as soon as possible, with eye doctor recommendations.

## 2024-07-24 NOTE — TELEPHONE ENCOUNTER
I called patient and will have to find a Retinal specialist that can see her. I told her I would call her back with a referral when I find the retinal specialist that takes her insurance. Verbalizes understanding.

## 2024-07-25 NOTE — TELEPHONE ENCOUNTER
I have called [facility] and they said they do not take her insurance, I called [facility] and also [facility] eye specialists but had to leave a message with information for them to call patient back or office.

## 2024-07-26 DIAGNOSIS — E11.9 TYPE 2 DIABETES MELLITUS WITHOUT COMPLICATION, UNSPECIFIED WHETHER LONG TERM INSULIN USE (HCC): Primary | ICD-10-CM

## 2024-07-26 NOTE — TELEPHONE ENCOUNTER
I called patient to inform her that her insurance has a site with listed providers that she can call, I called [facility] and left message as they have several Providers to choose from on her plan. I gave her the phone number to call to follow up on the call I made. States she will call and let us know if she was was able to get appointment or not.

## 2024-07-30 ENCOUNTER — TELEPHONE (OUTPATIENT)
Age: 54
End: 2024-07-30

## 2024-07-30 NOTE — TELEPHONE ENCOUNTER
PA for Insuline Glargine/ Lantus SoloStar 100UNIT/ML pen-injectors SUBMITTED     via    [x]CM-KEY: [id number]  []Bernardo-Case ID #   []Faxed to plan   []Other website   []Phone call Case ID #     Office notes sent, clinical questions answered. Awaiting determination    Turnaround time for your insurance to make a decision on your Prior Authorization can take 7-21 business days.

## 2024-08-01 NOTE — TELEPHONE ENCOUNTER
PA for Insuline Glargine/ Lantus SoloStar 100UNIT/ML pen-injectors not required     Reason (screenshot if applicable)          Patient advised by          [] MyChart Message  [] Phone call   []LMOM  []L/M to call office as no active Communication consent on file  [x]Unable to leave detailed message as phone is not in service       Pharmacy advised by    [x]Fax  []Phone call

## 2024-08-30 DIAGNOSIS — Z76.0 ENCOUNTER FOR MEDICATION REFILL: ICD-10-CM

## 2024-08-30 DIAGNOSIS — F41.9 ANXIETY: ICD-10-CM

## 2024-08-30 RX ORDER — ALPRAZOLAM 0.5 MG
0.5 TABLET ORAL
Qty: 30 TABLET | Refills: 0 | Status: SHIPPED | OUTPATIENT
Start: 2024-08-30 | End: 2024-09-29

## 2024-08-30 RX ORDER — RAMIPRIL 2.5 MG/1
2.5 CAPSULE ORAL DAILY
Qty: 30 CAPSULE | Refills: 0 | Status: SHIPPED | OUTPATIENT
Start: 2024-08-30 | End: 2024-09-29

## 2024-08-30 NOTE — TELEPHONE ENCOUNTER
Patient stated Dr Knight refills for her, I only see this prescribed in the hospital or Dr Cortez    Reason for call:   [x] Refill   [] Prior Auth  [] Other:     Office:   [x] PCP/Provider - Wong Cortez MD    [] Specialty/Provider -     Medication:   ALPRAZolam (XANAX) 0.5 mg tablet   ramipril (ALTACE) 2.5 mg capsule     Dose/Frequency:   0.5 mg, Oral, Daily at bedtime PRN   2.5 mg, Oral, Daily     Quantity:   30  30    Pharmacy: : [facility]/pharmacy #[id number] [address]     Does the patient have enough for 3 days?   [] Yes   [x] No - Send as HP to POD

## 2024-09-10 DIAGNOSIS — Z76.0 ENCOUNTER FOR MEDICATION REFILL: ICD-10-CM

## 2024-09-10 RX ORDER — QUETIAPINE FUMARATE 400 MG/1
400 TABLET, FILM COATED ORAL
Qty: 30 TABLET | Refills: 0 | Status: SHIPPED | OUTPATIENT
Start: 2024-09-10

## 2024-09-10 NOTE — TELEPHONE ENCOUNTER
Will provide 30-day Rx, but pt is overdue for PHQ-9 screening and lipid panel. I will order lipid panel at this time.

## 2024-10-17 DIAGNOSIS — E11.9 TYPE 2 DIABETES MELLITUS WITHOUT COMPLICATION, UNSPECIFIED WHETHER LONG TERM INSULIN USE (HCC): ICD-10-CM

## 2024-10-17 DIAGNOSIS — I10 PRIMARY HYPERTENSION: ICD-10-CM

## 2024-10-17 RX ORDER — ASPIRIN 81 MG/1
81 TABLET, COATED ORAL DAILY
Qty: 90 TABLET | Refills: 1 | Status: SHIPPED | OUTPATIENT
Start: 2024-10-17

## 2024-10-18 DIAGNOSIS — Z76.0 ENCOUNTER FOR MEDICATION REFILL: ICD-10-CM

## 2024-10-18 RX ORDER — QUETIAPINE FUMARATE 400 MG/1
400 TABLET, FILM COATED ORAL
Qty: 30 TABLET | Refills: 0 | Status: SHIPPED | OUTPATIENT
Start: 2024-10-18

## 2024-10-21 DIAGNOSIS — Z76.0 ENCOUNTER FOR MEDICATION REFILL: ICD-10-CM

## 2024-10-21 RX ORDER — RAMIPRIL 2.5 MG/1
2.5 CAPSULE ORAL DAILY
Qty: 90 CAPSULE | Refills: 1 | Status: SHIPPED | OUTPATIENT
Start: 2024-10-21 | End: 2025-04-19

## 2024-10-21 NOTE — TELEPHONE ENCOUNTER
Reason for call:   [x] Refill   [] Prior Auth  [] Other:     Office:   [x] PCP/Provider - Michele Knight MD   [] Specialty/Provider -     Medication: ramipril (ALTACE) 2.5 mg capsule       Pharmacy: [facility]/pharmacy #[id number] - [place] [address]      Does the patient have enough for 3 days?   [] Yes   [x] No - Send as HP to POD

## 2024-11-05 ENCOUNTER — OFFICE VISIT (OUTPATIENT)
Dept: OBGYN CLINIC | Facility: CLINIC | Age: 54
End: 2024-11-05
Payer: COMMERCIAL

## 2024-11-05 VITALS
DIASTOLIC BLOOD PRESSURE: 72 MMHG | HEIGHT: 66 IN | BODY MASS INDEX: 27.13 KG/M2 | WEIGHT: 168.8 LBS | SYSTOLIC BLOOD PRESSURE: 116 MMHG

## 2024-11-05 DIAGNOSIS — Z11.3 SCREENING FOR STDS (SEXUALLY TRANSMITTED DISEASES): ICD-10-CM

## 2024-11-05 DIAGNOSIS — Z11.4 SCREENING FOR HIV (HUMAN IMMUNODEFICIENCY VIRUS): ICD-10-CM

## 2024-11-05 DIAGNOSIS — Z11.59 NEED FOR HEPATITIS C SCREENING TEST: ICD-10-CM

## 2024-11-05 DIAGNOSIS — Z11.3 SCREEN FOR STD (SEXUALLY TRANSMITTED DISEASE): ICD-10-CM

## 2024-11-05 DIAGNOSIS — R39.9 UTI SYMPTOMS: Primary | ICD-10-CM

## 2024-11-05 DIAGNOSIS — Z11.59 NEED FOR HEPATITIS B SCREENING TEST: ICD-10-CM

## 2024-11-05 DIAGNOSIS — N89.8 VAGINAL DISCHARGE: ICD-10-CM

## 2024-11-05 LAB
SL AMB  POCT GLUCOSE, UA: ABNORMAL
SL AMB LEUKOCYTE ESTERASE,UA: ABNORMAL
SL AMB POCT BILIRUBIN,UA: ABNORMAL
SL AMB POCT BLOOD,UA: ABNORMAL
SL AMB POCT CLARITY,UA: CLEAR
SL AMB POCT COLOR,UA: YELLOW
SL AMB POCT KETONES,UA: ABNORMAL
SL AMB POCT NITRITE,UA: ABNORMAL
SL AMB POCT PH,UA: 5
SL AMB POCT SPECIFIC GRAVITY,UA: 1.05
SL AMB POCT URINE PROTEIN: ABNORMAL
SL AMB POCT UROBILINOGEN: ABNORMAL

## 2024-11-05 PROCEDURE — 87510 GARDNER VAG DNA DIR PROBE: CPT | Performed by: PHYSICIAN ASSISTANT

## 2024-11-05 PROCEDURE — 81002 URINALYSIS NONAUTO W/O SCOPE: CPT | Performed by: PHYSICIAN ASSISTANT

## 2024-11-05 PROCEDURE — 87480 CANDIDA DNA DIR PROBE: CPT | Performed by: PHYSICIAN ASSISTANT

## 2024-11-05 PROCEDURE — 87086 URINE CULTURE/COLONY COUNT: CPT | Performed by: PHYSICIAN ASSISTANT

## 2024-11-05 PROCEDURE — 87491 CHLMYD TRACH DNA AMP PROBE: CPT | Performed by: PHYSICIAN ASSISTANT

## 2024-11-05 PROCEDURE — 87660 TRICHOMONAS VAGIN DIR PROBE: CPT | Performed by: PHYSICIAN ASSISTANT

## 2024-11-05 PROCEDURE — 99213 OFFICE O/P EST LOW 20 MIN: CPT | Performed by: PHYSICIAN ASSISTANT

## 2024-11-05 PROCEDURE — 87591 N.GONORRHOEAE DNA AMP PROB: CPT | Performed by: PHYSICIAN ASSISTANT

## 2024-11-05 NOTE — PROGRESS NOTES
Assessment/Plan:      Diagnoses and all orders for this visit:    UTI symptoms  -     Urine culture  -     POCT urine dip    Vaginal discharge  -     VAGINOSIS DNA PROBE    Screen for STD (sexually transmitted disease)  -     HIV 1/2 AG/AB w Reflex [facility] for 2 yr old and above; Future  -     Hepatitis B surface antigen; Future  -     Hepatitis C antibody; Future  -     RPR-Syphilis Screening (Total Syphilis IGG/IGM); Future  -     Hepatitis B surface antigen    Screening for HIV (human immunodeficiency virus)  -     HIV 1/2 AG/AB w Reflex [facility] for 2 yr old and above; Future    Need for hepatitis B screening test  -     Hepatitis B surface antigen; Future  -     Hepatitis B surface antigen    Need for hepatitis C screening test  -     Hepatitis C antibody; Future    Screening for STDs (sexually transmitted diseases)  -     Chlamydia/GC amplified DNA by PCR     54-year-old female presenting today for acute visit of urinary symptoms also desires vaginal STD screening and blood work STD screening testing.    Urine dip in the office with some skewed discoloration but general 1+ leuks, trace ketone and 2+ glucose.  Patient agreeable to have urine sent for urine culture and will treat according to results.  Vaginosis probe and GC/CT also obtained today.  HIV, RPR, hepatitis B/C STD blood work also ordered.  Will call patient with results and treat accordingly.    Patient also encouraged to complete her routine labs and ensure her sugars/diabetes are stable.    RTO annual exam.    Chief Complaint   Patient presents with    Urinary Tract Infection     UTI-like symptoms. Pt states that she has incontinence.        Subjective:     Patient ID: Hyacinth Parra is a 54 y.o. female.    53y/o F here today for urinary sxs and desire vaginal and blood work STD testing.    She reports recent urine urgency/frequency and difficulty holding urine, which she reports more recent for her.   General bladder pressure, but no painful  urination.  No blood in urine, flank pain.  No fever or chills.    No vaginal itching or odor. Occasional vaginal discharge.  No pelvic pain      Diabetes controlled as of April with A1C 6.1.  She reports she is due for repeat labs, also desires STD screening blood work.        Review of Systems   Constitutional: Negative.    Gastrointestinal:  Negative for abdominal pain, diarrhea, nausea and vomiting.   Genitourinary:  Positive for frequency, urgency and vaginal discharge. Negative for difficulty urinating, dyspareunia, dysuria, flank pain, hematuria, pelvic pain, vaginal bleeding and vaginal pain.         The following portions of the patient's history were reviewed and updated as appropriate: allergies, current medications, past family history, past medical history, past social history, past surgical history, and problem list.      Objective:     Physical Exam  Vitals reviewed.   Constitutional:       Appearance: Normal appearance. She is not ill-appearing.   Abdominal:      Tenderness: There is no abdominal tenderness.   Genitourinary:     Labia:         Right: No rash, tenderness or lesion.         Left: No rash, tenderness or lesion.       Vagina: No vaginal discharge, erythema, tenderness, bleeding or lesions.      Cervix: Normal. No cervical motion tenderness, discharge, friability, lesion, erythema or cervical bleeding.      Uterus: Not tender.    Lymphadenopathy:      Lower Body: No right inguinal adenopathy. No left inguinal adenopathy.   Neurological:      Mental Status: She is alert and oriented to person, place, and time.   Psychiatric:         Mood and Affect: Mood normal.         Behavior: Behavior normal. Behavior is cooperative.

## 2024-11-06 LAB
CANDIDA RRNA VAG QL PROBE: NOT DETECTED
G VAGINALIS RRNA GENITAL QL PROBE: NOT DETECTED
T VAGINALIS RRNA GENITAL QL PROBE: NOT DETECTED

## 2024-11-14 DIAGNOSIS — Z76.0 ENCOUNTER FOR MEDICATION REFILL: ICD-10-CM

## 2024-11-14 RX ORDER — ROSUVASTATIN CALCIUM 20 MG/1
20 TABLET, COATED ORAL DAILY
Qty: 30 TABLET | Refills: 5 | Status: SHIPPED | OUTPATIENT
Start: 2024-11-14

## 2024-11-17 DIAGNOSIS — E11.9 TYPE 2 DIABETES MELLITUS WITHOUT COMPLICATION, UNSPECIFIED WHETHER LONG TERM INSULIN USE (HCC): ICD-10-CM

## 2024-11-18 RX ORDER — INSULIN GLARGINE 100 [IU]/ML
28 INJECTION, SOLUTION SUBCUTANEOUS
Qty: 15 ML | Refills: 0 | Status: SHIPPED | OUTPATIENT
Start: 2024-11-18

## 2024-11-18 NOTE — TELEPHONE ENCOUNTER
Patient called to request a refill for their (LANTUS SOLOSTAR)  advised a refill was requested on 11/17/2024 and is pending approval. Patient verbalized understanding and is in agreement.     Pt is completely out.

## 2024-11-19 ENCOUNTER — TELEPHONE (OUTPATIENT)
Age: 54
End: 2024-11-19
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
## 2024-11-20 NOTE — TELEPHONE ENCOUNTER
I called and spoke to Breonna her niece today to inform her that patient was instructed to go to ED yesterday but did not go, I also informed her that she had an appointment to be seen in the office today and did not come in. She stated that she would call patient's brother who lives close to check on her and she would also see if she needed a welfare check.

## 2024-11-24 ENCOUNTER — HOSPITAL ENCOUNTER (EMERGENCY)
Facility: HOSPITAL | Age: 54
Discharge: HOME | End: 2024-11-24
Attending: EMERGENCY MEDICINE | Admitting: EMERGENCY MEDICINE
Payer: COMMERCIAL

## 2024-11-24 VITALS
OXYGEN SATURATION: 100 % | SYSTOLIC BLOOD PRESSURE: 109 MMHG | RESPIRATION RATE: 20 BRPM | DIASTOLIC BLOOD PRESSURE: 92 MMHG | TEMPERATURE: 97.6 F | HEART RATE: 97 BPM

## 2024-11-24 DIAGNOSIS — L02.91 ABSCESS: Primary | ICD-10-CM

## 2024-11-24 LAB — GLUCOSE SERPL-MCNC: 187 MG/DL (ref 65–140)

## 2024-11-24 PROCEDURE — 82948 REAGENT STRIP/BLOOD GLUCOSE: CPT

## 2024-11-24 PROCEDURE — 99284 EMERGENCY DEPT VISIT MOD MDM: CPT | Performed by: EMERGENCY MEDICINE

## 2024-11-24 PROCEDURE — 99283 EMERGENCY DEPT VISIT LOW MDM: CPT

## 2024-11-24 RX ORDER — SULFAMETHOXAZOLE AND TRIMETHOPRIM 800; 160 MG/1; MG/1
1 TABLET ORAL ONCE
Status: COMPLETED | OUTPATIENT
Start: 2024-11-24 | End: 2024-11-24

## 2024-11-24 RX ORDER — SULFAMETHOXAZOLE AND TRIMETHOPRIM 800; 160 MG/1; MG/1
1 TABLET ORAL 2 TIMES DAILY
Qty: 14 TABLET | Refills: 0 | Status: SHIPPED | OUTPATIENT
Start: 2024-11-24 | End: 2024-12-01

## 2024-11-24 RX ORDER — CEPHALEXIN 500 MG/1
500 CAPSULE ORAL EVERY 6 HOURS SCHEDULED
Qty: 28 CAPSULE | Refills: 0 | Status: SHIPPED | OUTPATIENT
Start: 2024-11-24 | End: 2024-12-01

## 2024-11-24 RX ADMIN — CEPHALEXIN 500 MG: 250 CAPSULE ORAL at 14:18

## 2024-11-24 RX ADMIN — SULFAMETHOXAZOLE AND TRIMETHOPRIM 1 TABLET: 800; 160 TABLET ORAL at 14:18

## 2024-11-24 NOTE — DISCHARGE INSTRUCTIONS
Follow-up with your primary care physician.  Take the prescribed antibiotics as directed.  Please return to the emergency department if you develop worsening symptoms, increasing swelling, increasing redness, or anything else concerning to you.

## 2024-11-24 NOTE — ED PROVIDER NOTES
Time reflects when diagnosis was documented in both MDM as applicable and the Disposition within this note       Time User Action Codes Description Comment    11/24/2024  2:13 PM Alyssa Bo Add [L02.91] Abscess           ED Disposition       ED Disposition   Discharge    Condition   Stable    Date/Time   Sun Nov 24, 2024  2:13 PM    Comment   Hyacinth Parra discharge to home/self care.                   Assessment & Plan       Medical Decision Making  54-year-old female presenting for evaluation of an area of swelling to her right groin.  Based on history and exam, this appears to have been an abscess that she drained prior to arrival.  There is some overlying cellulitis.  No remaining pocket of drainable fluid noted on ultrasound.  Will initiate on Bactrim and Keflex.  Otherwise stable for discharge.  Return precautions discussed.    Problems Addressed:  Abscess: acute illness or injury    Amount and/or Complexity of Data Reviewed  Labs: ordered.    Risk  Prescription drug management.             Medications   sulfamethoxazole-trimethoprim (BACTRIM DS) 800-160 mg per tablet 1 tablet (1 tablet Oral Given 11/24/24 1418)   cephalexin (KEFLEX) capsule 500 mg (500 mg Oral Given 11/24/24 1418)       ED Risk Strat Scores                           SBIRT 22yo+      Flowsheet Row Most Recent Value   Initial Alcohol Screen: US AUDIT-C     1. How often do you have a drink containing alcohol? 0 Filed at: 11/24/2024 1356   2. How many drinks containing alcohol do you have on a typical day you are drinking?  0 Filed at: 11/24/2024 1351   3b. FEMALE Any Age, or MALE 65+: How often do you have 4 or more drinks on one occassion? 0 Filed at: 11/24/2024 1355   Audit-C Score 0 Filed at: 11/24/2024 1353   QUE: How many times in the past year have you...    Used an illegal drug or used a prescription medication for non-medical reasons? Never Filed at: 11/24/2024 1356                            History of Present Illness       Chief  "Complaint   Patient presents with    Blister     Blister on vagina, popped blister two days ago and touched eye now worried about infection.        Past Medical History:   Diagnosis Date    Asthma     Depression     Diabetes mellitus (HCC)     type 2    Hyperlipidemia     Hypertension       Past Surgical History:   Procedure Laterality Date    KNEE ARTHROCENTESIS      MOUTH SURGERY        Family History   Problem Relation Age of Onset    Diabetes Mother     Diabetes Father     Ovarian cancer Sister     Colon cancer Brother       Social History     Tobacco Use    Smoking status: Every Day     Current packs/day: 1.00     Average packs/day: 1 pack/day for 35.0 years (35.0 ttl pk-yrs)     Types: Cigarettes    Smokeless tobacco: Never   Vaping Use    Vaping status: Never Used   Substance Use Topics    Alcohol use: Not Currently    Drug use: Never      E-Cigarette/Vaping    E-Cigarette Use Never User       E-Cigarette/Vaping Substances    THC No     CBD No       I have reviewed and agree with the history as documented.     54-year-old female with history of diabetes presenting for evaluation of a \"blister\" around her right groin.  She states she first noticed it 2 days ago.  She did pop it and get purulent drainage from it but wanted to come in for evaluation.  She has mild pain around the area.  No fevers or other systemic symptoms.  She states that she had 1 of these approximately 1 year ago after which she developed some blurry vision.  She was told by ophthalmology that she had an infection in her eye and thinks that she may have transferred the infection from the abscess to her eye.  She has no eye complaints at this time.  She is requesting that we check her blood sugar.        Review of Systems   Constitutional:  Negative for chills and fever.   Respiratory:  Negative for shortness of breath.    Gastrointestinal:  Negative for abdominal pain, nausea and vomiting.   Genitourinary:  Negative for flank pain. "   Musculoskeletal:  Negative for gait problem.   Skin:  Positive for color change.   Neurological:  Negative for weakness and light-headedness.   All other systems reviewed and are negative.          Objective       ED Triage Vitals [11/24/24 1355]   Temperature Pulse Blood Pressure Respirations SpO2 Patient Position - Orthostatic VS   97.6 °F (36.4 °C) 97 109/92 20 100 % Sitting      Temp Source Heart Rate Source BP Location FiO2 (%) Pain Score    Tympanic Monitor Right arm -- No Pain      Vitals      Date and Time Temp Pulse SpO2 Resp BP Pain Score FACES Pain Rating User   11/24/24 1355 97.6 °F (36.4 °C) 97 100 % 20 109/92 No Pain -- DU            Physical Exam  Vitals and nursing note reviewed.   Constitutional:       General: She is not in acute distress.     Appearance: She is well-developed. She is not ill-appearing.   HENT:      Head: Normocephalic and atraumatic.      Nose: Nose normal.      Mouth/Throat:      Mouth: Mucous membranes are moist.   Eyes:      Conjunctiva/sclera: Conjunctivae normal.   Cardiovascular:      Rate and Rhythm: Normal rate and regular rhythm.      Heart sounds: No murmur heard.     No friction rub. No gallop.   Pulmonary:      Effort: Pulmonary effort is normal.      Breath sounds: Normal breath sounds. No wheezing, rhonchi or rales.   Abdominal:      General: There is no distension.      Palpations: Abdomen is soft.      Tenderness: There is no abdominal tenderness.   Musculoskeletal:         General: No swelling or tenderness. Normal range of motion.      Cervical back: Normal range of motion and neck supple.   Skin:     General: Skin is warm and dry.      Coloration: Skin is not pale.      Findings: No rash.      Comments: 0.5 cm x 0.5 cm area of induration noted to the right mons pubis.  Mild tenderness noted.  No palpable fluctuance.  No active drainage.  On bedside ultrasound, no fluid noted, cobblestoning noted.   Neurological:      Mental Status: She is alert and oriented to  person, place, and time.   Psychiatric:         Behavior: Behavior normal.         Results Reviewed       Procedure Component Value Units Date/Time    Fingerstick Glucose (POCT) [[id number]]  (Abnormal) Collected: 24    Lab Status: Final result Specimen: Blood Updated: 24     POC Glucose 187 mg/dl             No orders to display       Procedures    ED Medication and Procedure Management   Prior to Admission Medications   Prescriptions Last Dose Informant Patient Reported? Taking?   ALPRAZolam (XANAX) 0.5 mg tablet   No No   Sig: Take 1 tablet (0.5 mg total) by mouth daily at bedtime as needed for sleep or anxiety   BD Pen Needle Maile 2nd Gen 32G X 4 MM MISC   No No   Sig: Inject under the skin in the morning Use as directed   Blood Glucose Monitoring Suppl (ONE TOUCH ULTRA 2) w/Device KIT   No No   Sig: Check blood sugars three times daily. Please substitute with appropriate alternative as covered by patient's insurance. Dx: E11.65   Patient not taking: Reported on 2024   Blood Glucose Monitoring Suppl (OneTouch Verio Reflect) w/Device KIT   No No   Sig: Check blood sugars three times daily. Please substitute with appropriate alternative as covered by patient's insurance. Dx: E11.65   Patient not taking: Reported on 2024   Cholecalciferol (Vitamin D3) 50 MCG ( UT) TABS   No No   Sig: Take 1 tablet (2,000 Units total) by mouth daily   Insulin Glargine Solostar (Lantus SoloStar) 100 UNIT/ML SOPN   No No   Sig: INJECT 28 UNITS UNDER THE SKIN DAILY AT BEDTIME   Insulin Pen Needle (BD Pen Needle Maile U/F) 32G X 4 MM MISC   No No   Sig: Use daily as directed with insulin pen   Lancets (OneTouch Delica Plus [id number]) MISC   Yes No   Si (four) times a day Test   Multiple Vitamins-Minerals (ONE-A-DAY FOR HER VITACRAVES PO)   Yes No   Sig: Take by mouth   OneTouch Delica Lancets 33G MISC   No No   Sig: Check blood sugars three times daily. Please substitute with appropriate alternative  as covered by patient's insurance. Dx: E11.65   OneTouch Delica Lancets 33G MISC   No No   Sig: Check blood sugars three times daily. Please substitute with appropriate alternative as covered by patient's insurance. Dx: E11.65   PARoxetine (PAXIL) 30 mg tablet   Yes No   Sig: Take 30 mg by mouth daily   Patient not taking: Reported on 12/1/2023   QUEtiapine (SEROquel) 400 MG tablet   No No   Sig: TAKE 1 TABLET (400 MG TOTAL) BY MOUTH DAILY AT BEDTIME   aspirin (Aspirin Low Dose) 81 mg EC tablet   No No   Sig: TAKE 1 TABLET BY MOUTH EVERY DAY   clotrimazole-betamethasone (LOTRISONE) 1-0.05 % cream   No No   Sig: Apply topically 2 (two) times a day for 7 days To affected areas of vulva for itching.   gabapentin (NEURONTIN) 300 mg capsule   Yes No   Sig: Take 300 mg by mouth 2 (two) times a day as needed   Patient not taking: Reported on 12/1/2023   glucose blood (OneTouch Ultra) test strip   No No   Sig: Check blood sugars three times daily. Please substitute with appropriate alternative as covered by patient's insurance. Dx: E11.65   Patient not taking: Reported on 11/5/2024   glucose blood (OneTouch Verio) test strip   No No   Sig: Check blood sugars three times daily. Please substitute with appropriate alternative as covered by patient's insurance. Dx: E11.65   metFORMIN (GLUCOPHAGE) 1000 MG tablet   No No   Sig: TAKE 1 TABLET (1,000 MG TOTAL) BY MOUTH DAILY STOPPED JANUMET AND STARTED BACK ON METFORMIN   nicotine (NICODERM CQ) 21 mg/24 hr TD 24 hr patch   Yes No   Patient not taking: Reported on 11/5/2024   ramipril (ALTACE) 2.5 mg capsule   No No   Sig: Take 1 capsule (2.5 mg total) by mouth daily   rosuvastatin (CRESTOR) 20 MG tablet   No No   Sig: TAKE 1 TABLET BY MOUTH EVERY DAY   valACYclovir (VALTREX) 500 mg tablet   No No   Sig: Take 1 tablet (500 mg total) by mouth 2 (two) times a day for 3 days   Patient not taking: Reported on 4/26/2024      Facility-Administered Medications: None     Discharge  Medication List as of 11/24/2024  2:14 PM        START taking these medications    Details   cephalexin (KEFLEX) 500 mg capsule Take 1 capsule (500 mg total) by mouth every 6 (six) hours for 7 days, Starting Sun 11/24/2024, Until Sun 12/1/2024, Normal      sulfamethoxazole-trimethoprim (BACTRIM DS) 800-160 mg per tablet Take 1 tablet by mouth 2 (two) times a day for 7 days smx-tmp DS (BACTRIM) 800-160 mg tabs (1tab q12 D10), Starting Sun 11/24/2024, Until Sun 12/1/2024, Normal           CONTINUE these medications which have NOT CHANGED    Details   ALPRAZolam (XANAX) 0.5 mg tablet Take 1 tablet (0.5 mg total) by mouth daily at bedtime as needed for sleep or anxiety, Starting Fri 8/30/2024, Until Tue 11/5/2024 at 2359, Normal      aspirin (Aspirin Low Dose) 81 mg EC tablet TAKE 1 TABLET BY MOUTH EVERY DAY, Starting Thu 10/17/2024, Normal      BD Pen Needle Maile 2nd Gen 32G X 4 MM MISC Inject under the skin in the morning Use as directed, Starting Thu 7/11/2024, Until Sat 8/10/2024, Normal      !! Blood Glucose Monitoring Suppl (ONE TOUCH ULTRA 2) w/Device KIT Check blood sugars three times daily. Please substitute with appropriate alternative as covered by patient's insurance. Dx: E11.65, Normal      !! Blood Glucose Monitoring Suppl (OneTouch Verio Reflect) w/Device KIT Check blood sugars three times daily. Please substitute with appropriate alternative as covered by patient's insurance. Dx: E11.65, Normal      Cholecalciferol (Vitamin D3) 50 MCG (2000 UT) TABS Take 1 tablet (2,000 Units total) by mouth daily, Starting Fri 4/26/2024, Until Thu 7/25/2024, Normal      clotrimazole-betamethasone (LOTRISONE) 1-0.05 % cream Apply topically 2 (two) times a day for 7 days To affected areas of vulva for itching., Starting Fri 12/1/2023, Until Fri 12/8/2023, Normal      gabapentin (NEURONTIN) 300 mg capsule Take 300 mg by mouth 2 (two) times a day as needed, Starting Thu 6/1/2023, Historical Med      !! glucose blood  (OneTouch Ultra) test strip Check blood sugars three times daily. Please substitute with appropriate alternative as covered by patient's insurance. Dx: E11.65, Normal      !! glucose blood (OneTouch Verio) test strip Check blood sugars three times daily. Please substitute with appropriate alternative as covered by patient's insurance. Dx: E11.65, Normal      Insulin Glargine Solostar (Lantus SoloStar) 100 UNIT/ML SOPN INJECT 28 UNITS UNDER THE SKIN DAILY AT BEDTIME, Starting Mon 11/18/2024, Normal      Insulin Pen Needle (BD Pen Needle Maile U/F) 32G X 4 MM MISC Use daily as directed with insulin pen, Normal      !! Lancets (OneTouch Delica Plus [id number]) MISC 4 (four) times a day Test, Starting Mon 11/6/2023, Historical Med      metFORMIN (GLUCOPHAGE) 1000 MG tablet TAKE 1 TABLET (1,000 MG TOTAL) BY MOUTH DAILY STOPPED JANUMET AND STARTED BACK ON METFORMIN, Starting Thu 10/17/2024, Normal      Multiple Vitamins-Minerals (ONE-A-DAY FOR HER VITACRAVES PO) Take by mouth, Historical Med      nicotine (NICODERM CQ) 21 mg/24 hr TD 24 hr patch Starting Mon 5/8/2023, Historical Med      !! OneTouch Delica Lancets 33G MISC Check blood sugars three times daily. Please substitute with appropriate alternative as covered by patient's insurance. Dx: E11.65, Normal      !! OneTouch Delica Lancets 33G MISC Check blood sugars three times daily. Please substitute with appropriate alternative as covered by patient's insurance. Dx: E11.65, Normal      PARoxetine (PAXIL) 30 mg tablet Take 30 mg by mouth daily, Starting Sat 2/4/2023, Historical Med      QUEtiapine (SEROquel) 400 MG tablet TAKE 1 TABLET (400 MG TOTAL) BY MOUTH DAILY AT BEDTIME, Starting Fri 11/15/2024, Normal      ramipril (ALTACE) 2.5 mg capsule Take 1 capsule (2.5 mg total) by mouth daily, Starting Mon 10/21/2024, Until Sat 4/19/2025, Normal      rosuvastatin (CRESTOR) 20 MG tablet TAKE 1 TABLET BY MOUTH EVERY DAY, Starting Thu 11/14/2024, Normal      valACYclovir  (VALTREX) 500 mg tablet Take 1 tablet (500 mg total) by mouth 2 (two) times a day for 3 days, Starting Wed 1/24/2024, Until Mon 2/26/2024, Normal       !! - Potential duplicate medications found. Please discuss with provider.        No discharge procedures on file.  ED SEPSIS DOCUMENTATION   Time reflects when diagnosis was documented in both MDM as applicable and the Disposition within this note       Time User Action Codes Description Comment    11/24/2024  2:13 PM Alyssa Bo Add [L02.91] Abscess                  Alyssa Bo MD  11/24/24 1422

## 2024-11-26 ENCOUNTER — OFFICE VISIT (OUTPATIENT)
Dept: INTERNAL MEDICINE CLINIC | Facility: CLINIC | Age: 54
End: 2024-11-26
Payer: COMMERCIAL

## 2024-11-26 VITALS
HEIGHT: 65 IN | OXYGEN SATURATION: 100 % | BODY MASS INDEX: 27.66 KG/M2 | WEIGHT: 166 LBS | TEMPERATURE: 97.7 F | DIASTOLIC BLOOD PRESSURE: 82 MMHG | RESPIRATION RATE: 18 BRPM | HEART RATE: 99 BPM | SYSTOLIC BLOOD PRESSURE: 120 MMHG

## 2024-11-26 DIAGNOSIS — K29.00 ACUTE GASTRITIS WITHOUT HEMORRHAGE, UNSPECIFIED GASTRITIS TYPE: Primary | ICD-10-CM

## 2024-11-26 DIAGNOSIS — E11.9 TYPE 2 DIABETES MELLITUS WITHOUT COMPLICATION, UNSPECIFIED WHETHER LONG TERM INSULIN USE (HCC): ICD-10-CM

## 2024-11-26 PROBLEM — R07.9 CHEST PAIN: Status: RESOLVED | Noted: 2024-06-21 | Resolved: 2024-11-26

## 2024-11-26 LAB — SL AMB POCT HEMOGLOBIN AIC: 9.1 (ref ?–6.5)

## 2024-11-26 PROCEDURE — 82043 UR ALBUMIN QUANTITATIVE: CPT | Performed by: INTERNAL MEDICINE

## 2024-11-26 PROCEDURE — 99214 OFFICE O/P EST MOD 30 MIN: CPT | Performed by: INTERNAL MEDICINE

## 2024-11-26 PROCEDURE — 82570 ASSAY OF URINE CREATININE: CPT | Performed by: INTERNAL MEDICINE

## 2024-11-26 PROCEDURE — 83036 HEMOGLOBIN GLYCOSYLATED A1C: CPT | Performed by: INTERNAL MEDICINE

## 2024-11-26 RX ORDER — PROCHLORPERAZINE 25 MG/1
SUPPOSITORY RECTAL
Qty: 9 EACH | Refills: 3 | Status: SHIPPED | OUTPATIENT
Start: 2024-11-26

## 2024-11-26 RX ORDER — INSULIN GLARGINE 100 [IU]/ML
36 INJECTION, SOLUTION SUBCUTANEOUS
Qty: 30 ML | Refills: 0 | Status: SHIPPED | OUTPATIENT
Start: 2024-11-26 | End: 2025-02-24

## 2024-11-26 RX ORDER — PANTOPRAZOLE SODIUM 20 MG/1
20 TABLET, DELAYED RELEASE ORAL
Qty: 30 TABLET | Refills: 0 | Status: SHIPPED | OUTPATIENT
Start: 2024-11-26 | End: 2025-05-25

## 2024-11-26 RX ORDER — PROCHLORPERAZINE 25 MG/1
SUPPOSITORY RECTAL
Qty: 1 EACH | Refills: 3 | Status: SHIPPED | OUTPATIENT
Start: 2024-11-26

## 2024-11-26 NOTE — ASSESSMENT & PLAN NOTE
Lab Results   Component Value Date    HGBA1C 9.1 (A) 11/26/2024   A1c is elevated. Patient is currently using 36 units (instead of 28) of Lantus, will continue the same.  Increase metformin from 1000mg daily to BID.  Urine albumin to creatinine collected today.  CGM ordered for better track of glucose since multiple devices used in the past did not work.  Follow up in 1 month also for Physical and address other care gaps.    Orders:    Albumin / creatinine urine ratio; Future    POCT hemoglobin A1c    Continuous Glucose Sensor (Dexcom G6 Sensor) MISC; 3 PACK SENSOR FOR CONTINUOUS GLUCOSE MONITORING    Continuous Glucose Transmitter (Dexcom G6 Transmitter) MISC; 1 TRANSMITTED EVERY 3 MONTHS FOR CONTINUOUS GLUCOSE MONITORING    metFORMIN (GLUCOPHAGE) 1000 MG tablet; Take 1 tablet (1,000 mg total) by mouth 2 (two) times a day with meals Stopped Janumet and started back on Metformin    Insulin Glargine Solostar (Lantus SoloStar) 100 UNIT/ML SOPN; Inject 0.36 mL (36 Units total) as directed daily at bedtime

## 2024-11-26 NOTE — PROGRESS NOTES
Name: Hyacinth Parra      : 1970      MRN: [id number]  Encounter Provider: Daniella Hsu MD  Encounter Date: 2024   Encounter department: [facility] INTERNAL MEDICINE [place]  :  Assessment & Plan  Type 2 diabetes mellitus without complication, unspecified whether long term insulin use (HCC)    Lab Results   Component Value Date    HGBA1C 9.1 (A) 2024   A1c is elevated. Patient is currently using 36 units (instead of 28) of Lantus, will continue the same.  Increase metformin from 1000mg daily to BID.  Urine albumin to creatinine collected today.  CGM ordered for better track of glucose since multiple devices used in the past did not work.  Follow up in 1 month also for Physical and address other care gaps.    Orders:    Albumin / creatinine urine ratio; Future    POCT hemoglobin A1c    Continuous Glucose Sensor (Dexcom G6 Sensor) MISC; 3 PACK SENSOR FOR CONTINUOUS GLUCOSE MONITORING    Continuous Glucose Transmitter (Dexcom G6 Transmitter) MISC; 1 TRANSMITTED EVERY 3 MONTHS FOR CONTINUOUS GLUCOSE MONITORING    metFORMIN (GLUCOPHAGE) 1000 MG tablet; Take 1 tablet (1,000 mg total) by mouth 2 (two) times a day with meals Stopped Janumet and started back on Metformin    Insulin Glargine Solostar (Lantus SoloStar) 100 UNIT/ML SOPN; Inject 0.36 mL (36 Units total) as directed daily at bedtime    Acute gastritis without hemorrhage, unspecified gastritis type  Will treat with pantoprazole for 1 month and re-evaluate next visit.   Orders:    pantoprazole (PROTONIX) 20 mg tablet; Take 1 tablet (20 mg total) by mouth daily before breakfast          Tobacco Cessation Counseling: The patient is sincerely urged to quit consumption of tobacco. She is not ready to quit tobacco.   History of Present Illness     Patient presents in the office to discuss insulin. She states she has been feeling very tired, and unwell. She believes the insulin has not been enough for her and she increase her lantus from 28 to  36 bc she thinks her sugars are high. She tried 4 different glucose monitors in the past and none worked for her, she does not check sugars at home for this reason. She is also complaining of stomach pain and intermittent reflux, indigestion, nausea, for about 1 month. She has not tried anything for this.     Review of Systems   Constitutional:  Positive for fatigue.   Respiratory:  Negative for cough and shortness of breath.    Cardiovascular:  Negative for chest pain.   Gastrointestinal:  Positive for abdominal pain (stomach) and nausea. Negative for diarrhea and vomiting.   Genitourinary:  Negative for frequency and urgency.   Musculoskeletal:  Negative for arthralgias.   Skin:  Negative for rash.   Psychiatric/Behavioral:  Negative for confusion and sleep disturbance.      Current Outpatient Medications on File Prior to Visit   Medication Sig Dispense Refill    aspirin (Aspirin Low Dose) 81 mg EC tablet TAKE 1 TABLET BY MOUTH EVERY DAY 90 tablet 1    Blood Glucose Monitoring Suppl (ONE TOUCH ULTRA 2) w/Device KIT Check blood sugars three times daily. Please substitute with appropriate alternative as covered by patient's insurance. Dx: E11.65 1 kit 0    Blood Glucose Monitoring Suppl (OneTouch Verio Reflect) w/Device KIT Check blood sugars three times daily. Please substitute with appropriate alternative as covered by patient's insurance. Dx: E11.65 1 kit 0    cephalexin (KEFLEX) 500 mg capsule Take 1 capsule (500 mg total) by mouth every 6 (six) hours for 7 days 28 capsule 0    glucose blood (OneTouch Verio) test strip Check blood sugars three times daily. Please substitute with appropriate alternative as covered by patient's insurance. Dx: E11.65 300 each 3    Insulin Pen Needle (BD Pen Needle Maile U/F) 32G X 4 MM MISC Use daily as directed with insulin pen 100 each 3    Multiple Vitamins-Minerals (ONE-A-DAY FOR HER VITACRAVES PO) Take by mouth      OneTouch Delica Lancets 33G MISC Check blood sugars three  times daily. Please substitute with appropriate alternative as covered by patient's insurance. Dx: E11.65 300 each 3    QUEtiapine (SEROquel) 400 MG tablet TAKE 1 TABLET (400 MG TOTAL) BY MOUTH DAILY AT BEDTIME 30 tablet 0    ramipril (ALTACE) 2.5 mg capsule Take 1 capsule (2.5 mg total) by mouth daily 90 capsule 1    rosuvastatin (CRESTOR) 20 MG tablet TAKE 1 TABLET BY MOUTH EVERY DAY 30 tablet 5    sulfamethoxazole-trimethoprim (BACTRIM DS) 800-160 mg per tablet Take 1 tablet by mouth 2 (two) times a day for 7 days smx-tmp DS (BACTRIM) 800-160 mg tabs (1tab q12 D10) 14 tablet 0    ALPRAZolam (XANAX) 0.5 mg tablet Take 1 tablet (0.5 mg total) by mouth daily at bedtime as needed for sleep or anxiety 30 tablet 0    BD Pen Needle Maile 2nd Gen 32G X 4 MM MISC Inject under the skin in the morning Use as directed 30 each 0    Cholecalciferol (Vitamin D3) 50 MCG (2000 UT) TABS Take 1 tablet (2,000 Units total) by mouth daily 90 tablet 0    clotrimazole-betamethasone (LOTRISONE) 1-0.05 % cream Apply topically 2 (two) times a day for 7 days To affected areas of vulva for itching. 45 g 1    glucose blood (OneTouch Ultra) test strip Check blood sugars three times daily. Please substitute with appropriate alternative as covered by patient's insurance. Dx: E11.65 (Patient not taking: Reported on 11/26/2024) 300 each 3    PARoxetine (PAXIL) 30 mg tablet Take 30 mg by mouth daily (Patient not taking: Reported on 11/26/2024)       No current facility-administered medications on file prior to visit.      Social History     Tobacco Use    Smoking status: Every Day     Current packs/day: 1.00     Average packs/day: 1 pack/day for 35.0 years (35.0 ttl pk-yrs)     Types: Cigarettes    Smokeless tobacco: Never   Vaping Use    Vaping status: Never Used   Substance and Sexual Activity    Alcohol use: Not Currently    Drug use: Never    Sexual activity: Yes     Partners: Male     Birth control/protection: None     Comment: last intercourse  "2 months        Objective   /82 (BP Location: Left arm, Patient Position: Sitting, Cuff Size: Adult)   Pulse 99   Temp 97.7 °F (36.5 °C) (Tympanic)   Resp 18   Ht 5' 5\" (1.651 m)   Wt 75.3 kg (166 lb)   SpO2 100%   BMI 27.62 kg/m²      Physical Exam  Constitutional:       General: She is not in acute distress.  HENT:      Head: Normocephalic and atraumatic.   Eyes:      Extraocular Movements: Extraocular movements intact.      Pupils: Pupils are equal, round, and reactive to light.   Cardiovascular:      Rate and Rhythm: Normal rate and regular rhythm.   Pulmonary:      Effort: Pulmonary effort is normal. No respiratory distress.   Abdominal:      General: There is no distension.   Musculoskeletal:         General: Normal range of motion.   Neurological:      Mental Status: She is alert and oriented to person, place, and time.   Psychiatric:         Thought Content: Thought content normal.         Judgment: Judgment normal.     "

## 2024-11-27 PROBLEM — K29.00 ACUTE GASTRITIS WITHOUT HEMORRHAGE: Status: ACTIVE | Noted: 2024-11-27

## 2024-11-27 LAB
CREAT UR-MCNC: 63.2 MG/DL
MICROALBUMIN UR-MCNC: 12.9 MG/L
MICROALBUMIN/CREAT 24H UR: 20 MG/G CREATININE (ref 0–30)

## 2024-11-27 NOTE — ASSESSMENT & PLAN NOTE
Will treat with pantoprazole for 1 month and re-evaluate next visit.   Orders:    pantoprazole (PROTONIX) 20 mg tablet; Take 1 tablet (20 mg total) by mouth daily before breakfast

## 2024-12-14 DIAGNOSIS — Z76.0 ENCOUNTER FOR MEDICATION REFILL: ICD-10-CM

## 2024-12-16 RX ORDER — QUETIAPINE FUMARATE 400 MG/1
400 TABLET, FILM COATED ORAL
Qty: 90 TABLET | Refills: 0 | Status: SHIPPED | OUTPATIENT
Start: 2024-12-16

## 2024-12-18 DIAGNOSIS — E11.9 TYPE 2 DIABETES MELLITUS WITHOUT COMPLICATION, UNSPECIFIED WHETHER LONG TERM INSULIN USE (HCC): ICD-10-CM

## 2024-12-18 NOTE — TELEPHONE ENCOUNTER
Reason for call:   [x] Refill   [] Prior Auth  [] Other:     Office:   [x] PCP/Provider -   [] Specialty/Provider -     Medication:     Insulin Glargine Solostar (Lantus SoloStar) 100 UNIT/ML : Inject 0.36 mL (36 Units total) as directed daily at bedtime          metFORMIN (GLUCOPHAGE) 1000 MG Take 1 tablet (1,000 mg total) by mouth 2 (two) times a day with meal        Pharmacy: [facility]     Does the patient have enough for 3 days?   [x] Yes   [] No - Send as HP to POD

## 2024-12-19 RX ORDER — INSULIN GLARGINE 100 [IU]/ML
36 INJECTION, SOLUTION SUBCUTANEOUS
Qty: 30 ML | Refills: 0 | Status: SHIPPED | OUTPATIENT
Start: 2024-12-19 | End: 2025-06-04

## 2024-12-23 DIAGNOSIS — K29.00 ACUTE GASTRITIS WITHOUT HEMORRHAGE, UNSPECIFIED GASTRITIS TYPE: ICD-10-CM

## 2024-12-24 RX ORDER — PANTOPRAZOLE SODIUM 20 MG/1
20 TABLET, DELAYED RELEASE ORAL
Qty: 30 TABLET | Refills: 2 | Status: SHIPPED | OUTPATIENT
Start: 2024-12-24

## 2024-12-26 ENCOUNTER — HOSPITAL ENCOUNTER (EMERGENCY)
Facility: HOSPITAL | Age: 54
Discharge: HOME | End: 2024-12-26
Attending: EMERGENCY MEDICINE
Payer: COMMERCIAL

## 2024-12-26 ENCOUNTER — APPOINTMENT (EMERGENCY)
Dept: RADIOLOGY | Facility: HOSPITAL | Age: 54
End: 2024-12-26
Payer: COMMERCIAL

## 2024-12-26 VITALS
HEART RATE: 97 BPM | SYSTOLIC BLOOD PRESSURE: 147 MMHG | RESPIRATION RATE: 18 BRPM | DIASTOLIC BLOOD PRESSURE: 77 MMHG | OXYGEN SATURATION: 96 % | TEMPERATURE: 97.9 F

## 2024-12-26 DIAGNOSIS — R73.9 HYPERGLYCEMIA: ICD-10-CM

## 2024-12-26 DIAGNOSIS — Z71.1 NO PROBLEM, FEARED COMPLAINT UNFOUNDED: Primary | ICD-10-CM

## 2024-12-26 LAB — GLUCOSE SERPL-MCNC: 194 MG/DL (ref 65–140)

## 2024-12-26 PROCEDURE — 71046 X-RAY EXAM CHEST 2 VIEWS: CPT

## 2024-12-26 PROCEDURE — 99283 EMERGENCY DEPT VISIT LOW MDM: CPT

## 2024-12-26 PROCEDURE — 99284 EMERGENCY DEPT VISIT MOD MDM: CPT | Performed by: PHYSICIAN ASSISTANT

## 2024-12-26 PROCEDURE — 82948 REAGENT STRIP/BLOOD GLUCOSE: CPT

## 2024-12-26 RX ORDER — ALPRAZOLAM 0.5 MG
1 TABLET ORAL ONCE
Status: DISCONTINUED | OUTPATIENT
Start: 2024-12-26 | End: 2024-12-26 | Stop reason: HOSPADM
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
## 2025-01-03 ENCOUNTER — APPOINTMENT (OUTPATIENT)
Dept: LAB | Facility: HOSPITAL | Age: 55
End: 2025-01-03
Payer: COMMERCIAL

## 2025-01-03 DIAGNOSIS — Z11.4 SCREENING FOR HIV (HUMAN IMMUNODEFICIENCY VIRUS): ICD-10-CM

## 2025-01-03 DIAGNOSIS — Z11.3 SCREEN FOR STD (SEXUALLY TRANSMITTED DISEASE): ICD-10-CM

## 2025-01-03 DIAGNOSIS — Z11.59 NEED FOR HEPATITIS C SCREENING TEST: ICD-10-CM

## 2025-01-03 DIAGNOSIS — E11.9 TYPE 2 DIABETES MELLITUS WITHOUT COMPLICATION, UNSPECIFIED WHETHER LONG TERM INSULIN USE (HCC): ICD-10-CM

## 2025-01-03 LAB
ALBUMIN SERPL BCG-MCNC: 4.9 G/DL (ref 3.5–5)
ALP SERPL-CCNC: 90 U/L (ref 34–104)
ALT SERPL W P-5'-P-CCNC: 41 U/L (ref 7–52)
ANION GAP SERPL CALCULATED.3IONS-SCNC: 10 MMOL/L (ref 4–13)
AST SERPL W P-5'-P-CCNC: 27 U/L (ref 13–39)
BILIRUB SERPL-MCNC: 0.51 MG/DL (ref 0.2–1)
BUN SERPL-MCNC: 12 MG/DL (ref 5–25)
CALCIUM SERPL-MCNC: 9.9 MG/DL (ref 8.4–10.2)
CHLORIDE SERPL-SCNC: 101 MMOL/L (ref 96–108)
CO2 SERPL-SCNC: 26 MMOL/L (ref 21–32)
CREAT SERPL-MCNC: 0.63 MG/DL (ref 0.6–1.3)
GFR SERPL CREATININE-BSD FRML MDRD: 102 ML/MIN/1.73SQ M
GLUCOSE SERPL-MCNC: 190 MG/DL (ref 65–140)
HBV SURFACE AG SER QL: NORMAL
HCV AB SER QL: NORMAL
HIV 1+2 AB+HIV1 P24 AG SERPL QL IA: NORMAL
HIV 2 AB SERPL QL IA: NORMAL
HIV1 AB SERPL QL IA: NORMAL
HIV1 P24 AG SERPL QL IA: NORMAL
POTASSIUM SERPL-SCNC: 4.2 MMOL/L (ref 3.5–5.3)
PROT SERPL-MCNC: 7.7 G/DL (ref 6.4–8.4)
SODIUM SERPL-SCNC: 137 MMOL/L (ref 135–147)

## 2025-01-03 PROCEDURE — 86780 TREPONEMA PALLIDUM: CPT

## 2025-01-03 PROCEDURE — 86803 HEPATITIS C AB TEST: CPT

## 2025-01-03 PROCEDURE — 36415 COLL VENOUS BLD VENIPUNCTURE: CPT

## 2025-01-03 PROCEDURE — 86592 SYPHILIS TEST NON-TREP QUAL: CPT

## 2025-01-03 PROCEDURE — 87389 HIV-1 AG W/HIV-1&-2 AB AG IA: CPT

## 2025-01-03 PROCEDURE — 83036 HEMOGLOBIN GLYCOSYLATED A1C: CPT

## 2025-01-03 PROCEDURE — 87340 HEPATITIS B SURFACE AG IA: CPT

## 2025-01-03 PROCEDURE — 80053 COMPREHEN METABOLIC PANEL: CPT

## 2025-01-04 LAB
EST. AVERAGE GLUCOSE BLD GHB EST-MCNC: 192 MG/DL
HBA1C MFR BLD: 8.3 %
RPR SER QL: NORMAL
TREPONEMA PALLIDUM IGG+IGM AB [PRESENCE] IN SERUM OR PLASMA BY IMMUNOASSAY: REACTIVE

## 2025-01-07 ENCOUNTER — TELEPHONE (OUTPATIENT)
Age: 55
End: 2025-01-07

## 2025-01-07 LAB — T PALLIDUM AB SER QL AGGL: NON REACTIVE
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
## 2025-01-08 ENCOUNTER — OFFICE VISIT (OUTPATIENT)
Dept: INTERNAL MEDICINE CLINIC | Facility: CLINIC | Age: 55
End: 2025-01-08
Payer: COMMERCIAL

## 2025-01-08 VITALS
BODY MASS INDEX: 26.99 KG/M2 | SYSTOLIC BLOOD PRESSURE: 120 MMHG | HEART RATE: 88 BPM | TEMPERATURE: 97.8 F | OXYGEN SATURATION: 99 % | RESPIRATION RATE: 16 BRPM | DIASTOLIC BLOOD PRESSURE: 80 MMHG | HEIGHT: 65 IN | WEIGHT: 162 LBS

## 2025-01-08 DIAGNOSIS — Z11.3 SCREEN FOR STD (SEXUALLY TRANSMITTED DISEASE): ICD-10-CM

## 2025-01-08 DIAGNOSIS — E11.65 TYPE 2 DIABETES MELLITUS WITH HYPERGLYCEMIA, WITH LONG-TERM CURRENT USE OF INSULIN (HCC): Primary | ICD-10-CM

## 2025-01-08 DIAGNOSIS — E78.2 MIXED HYPERLIPIDEMIA: ICD-10-CM

## 2025-01-08 DIAGNOSIS — Z11.4 SCREENING FOR HIV (HUMAN IMMUNODEFICIENCY VIRUS): Primary | ICD-10-CM

## 2025-01-08 DIAGNOSIS — Z76.0 ENCOUNTER FOR MEDICATION REFILL: ICD-10-CM

## 2025-01-08 DIAGNOSIS — Z79.4 TYPE 2 DIABETES MELLITUS WITH HYPERGLYCEMIA, WITH LONG-TERM CURRENT USE OF INSULIN (HCC): Primary | ICD-10-CM

## 2025-01-08 DIAGNOSIS — I10 PRIMARY HYPERTENSION: ICD-10-CM

## 2025-01-08 DIAGNOSIS — Z00.00 ANNUAL PHYSICAL EXAM: ICD-10-CM

## 2025-01-08 DIAGNOSIS — Z11.59 NEED FOR HEPATITIS B SCREENING TEST: ICD-10-CM

## 2025-01-08 DIAGNOSIS — Z11.59 NEED FOR HEPATITIS C SCREENING TEST: ICD-10-CM

## 2025-01-08 DIAGNOSIS — F41.9 ANXIETY: ICD-10-CM

## 2025-01-08 DIAGNOSIS — Z12.31 ENCOUNTER FOR SCREENING MAMMOGRAM FOR BREAST CANCER: ICD-10-CM

## 2025-01-08 PROCEDURE — 99214 OFFICE O/P EST MOD 30 MIN: CPT | Performed by: INTERNAL MEDICINE

## 2025-01-08 PROCEDURE — 99396 PREV VISIT EST AGE 40-64: CPT | Performed by: INTERNAL MEDICINE

## 2025-01-08 RX ORDER — RAMIPRIL 2.5 MG/1
2.5 CAPSULE ORAL DAILY
Qty: 90 CAPSULE | Refills: 1 | Status: SHIPPED | OUTPATIENT
Start: 2025-01-08 | End: 2025-07-07

## 2025-01-08 RX ORDER — ALPRAZOLAM 0.5 MG
0.5 TABLET ORAL
Qty: 30 TABLET | Refills: 0 | Status: SHIPPED | OUTPATIENT
Start: 2025-01-08 | End: 2025-02-07

## 2025-01-08 RX ORDER — ROSUVASTATIN CALCIUM 20 MG/1
20 TABLET, COATED ORAL DAILY
Qty: 90 TABLET | Refills: 1 | Status: SHIPPED | OUTPATIENT
Start: 2025-01-08

## 2025-01-08 NOTE — ASSESSMENT & PLAN NOTE
Preventive care done in detail as mentioned below, patient is due for MMG, Flu vaccine done last year.

## 2025-01-08 NOTE — ASSESSMENT & PLAN NOTE
Lab Results   Component Value Date    HGBA1C 8.3 (H) 01/03/2025   Continue metformin 1000 mg BID  Continue Lantus 36 units daily  Foot exam done today.  Up to date with eye examinatio.  Will start to use dexcom  F/u in 3 months

## 2025-01-08 NOTE — TELEPHONE ENCOUNTER
I tried calling patient, left message for her to return call as I would like to review her test results as well as  her regarding exposures and recommendations for blood work STD screening.  Will await patient's call back.
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
## 2025-01-08 NOTE — TELEPHONE ENCOUNTER
Pt called in again for her results. Advised not yet reviewed by provider but once it has been, she will be notified. Also encouraged pt to sign up for her [employer] account so she can access results there. Pt thankful.

## 2025-01-08 NOTE — PROGRESS NOTES
Adult Annual Physical  Name: Hyacinth Parra      : 1970      MRN: [id number]  Encounter Provider: Daniella Hsu MD  Encounter Date: 2025   Encounter department: [facility] [place]    Assessment & Plan  Type 2 diabetes mellitus with hyperglycemia, with long-term current use of insulin (HCC)    Lab Results   Component Value Date    HGBA1C 8.3 (H) 2025   Continue metformin 1000 mg BID  Continue Lantus 36 units daily  Foot exam done today.  Up to date with eye examinatio.  Will start to use dexcom  F/u in 3 months         Encounter for screening mammogram for breast cancer    Orders:  •  Mammo screening bilateral w 3d and cad; Future    Encounter for medication refill    Orders:  •  ramipril (ALTACE) 2.5 mg capsule; Take 1 capsule (2.5 mg total) by mouth daily  •  rosuvastatin (CRESTOR) 20 MG tablet; Take 1 tablet (20 mg total) by mouth daily    Primary hypertension  Well controlled         Annual physical exam  Preventive care done in detail as mentioned below, patient is due for MMG, Flu vaccine done last year.       Anxiety  On xanax PRN  Orders:  •  ALPRAZolam (XANAX) 0.5 mg tablet; Take 1 tablet (0.5 mg total) by mouth daily at bedtime as needed for sleep or anxiety    Mixed hyperlipidemia  On statin, check lipid panel, goal of LDL is less than 70         Immunizations and preventive care screenings were discussed with patient today. Appropriate education was printed on patient's after visit summary.    Counseling:  Alcohol/drug use: discussed moderation in alcohol intake, the recommendations for healthy alcohol use, and avoidance of illicit drug use.  Dental Health: discussed importance of regular tooth brushing, flossing, and dental visits.  Exercise: the importance of regular exercise/physical activity was discussed. Recommend exercise 3-5 times per week for at least 30 minutes.          History of Present Illness     Adult Annual Physical:  Patient presents for annual  physical. Patient presents in the office for a follow up visit and annual Physical examination.  Blood work reviewed..     Review of Systems   Constitutional:  Negative for appetite change and fatigue.   Eyes:  Negative for visual disturbance.   Respiratory:  Negative for cough, chest tightness, shortness of breath and wheezing.    Cardiovascular:  Negative for chest pain, palpitations and leg swelling.   Gastrointestinal:  Negative for abdominal pain, nausea and vomiting.   Genitourinary:  Negative for difficulty urinating and frequency.   Musculoskeletal:  Negative for arthralgias and joint swelling.   Skin:  Negative for rash.   Neurological:  Negative for dizziness and headaches.     Current Outpatient Medications on File Prior to Visit   Medication Sig Dispense Refill   • aspirin (Aspirin Low Dose) 81 mg EC tablet TAKE 1 TABLET BY MOUTH EVERY DAY 90 tablet 1   • BD Pen Needle Maile 2nd Gen 32G X 4 MM MISC Inject under the skin in the morning Use as directed 30 each 0   • Blood Glucose Monitoring Suppl (ONE TOUCH ULTRA 2) w/Device KIT Check blood sugars three times daily. Please substitute with appropriate alternative as covered by patient's insurance. Dx: E11.65 1 kit 0   • Blood Glucose Monitoring Suppl (OneTouch Verio Reflect) w/Device KIT Check blood sugars three times daily. Please substitute with appropriate alternative as covered by patient's insurance. Dx: E11.65 1 kit 0   • Cholecalciferol (Vitamin D3) 50 MCG (2000 UT) TABS Take 1 tablet (2,000 Units total) by mouth daily 90 tablet 0   • Continuous Glucose Sensor (Dexcom G6 Sensor) MISC 3 PACK SENSOR FOR CONTINUOUS GLUCOSE MONITORING 9 each 3   • Continuous Glucose Transmitter (Dexcom G6 Transmitter) MISC 1 TRANSMITTED EVERY 3 MONTHS FOR CONTINUOUS GLUCOSE MONITORING 1 each 3   • Insulin Glargine Solostar (Lantus SoloStar) 100 UNIT/ML SOPN Inject 0.36 mL (36 Units total) as directed daily at bedtime 30 mL 0   • metFORMIN (GLUCOPHAGE) 1000 MG tablet Take  1 tablet (1,000 mg total) by mouth 2 (two) times a day with meals 180 tablet 1   • Multiple Vitamins-Minerals (ONE-A-DAY FOR HER VITACRAVES PO) Take by mouth     • pantoprazole (PROTONIX) 20 mg tablet TAKE 1 TABLET BY MOUTH DAILY BEFORE BREAKFAST. 30 tablet 2   • QUEtiapine (SEROquel) 400 MG tablet TAKE 1 TABLET (400 MG TOTAL) BY MOUTH DAILY AT BEDTIME 90 tablet 0   • clotrimazole-betamethasone (LOTRISONE) 1-0.05 % cream Apply topically 2 (two) times a day for 7 days To affected areas of vulva for itching. 45 g 1   • glucose blood (OneTouch Ultra) test strip Check blood sugars three times daily. Please substitute with appropriate alternative as covered by patient's insurance. Dx: E11.65 (Patient not taking: Reported on 11/5/2024) 300 each 3   • glucose blood (OneTouch Verio) test strip Check blood sugars three times daily. Please substitute with appropriate alternative as covered by patient's insurance. Dx: E11.65 (Patient not taking: Reported on 1/8/2025) 300 each 3   • Insulin Pen Needle (BD Pen Needle Maile U/F) 32G X 4 MM MISC Use daily as directed with insulin pen (Patient not taking: Reported on 1/8/2025) 100 each 3   • OneTouch Delica Lancets 33G MISC Check blood sugars three times daily. Please substitute with appropriate alternative as covered by patient's insurance. Dx: E11.65 (Patient not taking: Reported on 1/8/2025) 300 each 3   • PARoxetine (PAXIL) 30 mg tablet Take 30 mg by mouth daily (Patient not taking: Reported on 12/1/2023)       No current facility-administered medications on file prior to visit.      Social History     Tobacco Use   • Smoking status: Every Day     Current packs/day: 1.00     Average packs/day: 1 pack/day for 35.0 years (35.0 ttl pk-yrs)     Types: Cigarettes   • Smokeless tobacco: Never   Vaping Use   • Vaping status: Never Used   Substance and Sexual Activity   • Alcohol use: Not Currently   • Drug use: Never   • Sexual activity: Yes     Partners: Male     Birth  "control/protection: None     Comment: last intercourse 2 months       Objective   /80 (BP Location: Left arm, Patient Position: Sitting, Cuff Size: Adult)   Pulse 88   Temp 97.8 °F (36.6 °C) (Tympanic)   Resp 16   Ht 5' 5\" (1.651 m)   Wt 73.5 kg (162 lb)   SpO2 99%   BMI 26.96 kg/m²     Physical Exam  Vitals and nursing note reviewed.   Constitutional:       General: She is not in acute distress.     Appearance: She is well-developed.   HENT:      Head: Normocephalic and atraumatic.   Eyes:      Conjunctiva/sclera: Conjunctivae normal.   Cardiovascular:      Rate and Rhythm: Normal rate and regular rhythm.      Pulses: no weak pulses.           Dorsalis pedis pulses are 2+ on the right side and 2+ on the left side.        Posterior tibial pulses are 2+ on the right side and 2+ on the left side.      Heart sounds: No murmur heard.  Pulmonary:      Effort: Pulmonary effort is normal. No respiratory distress.      Breath sounds: Normal breath sounds.   Abdominal:      Palpations: Abdomen is soft.      Tenderness: There is no abdominal tenderness.   Musculoskeletal:         General: No swelling.      Cervical back: Neck supple.   Feet:      Right foot:      Skin integrity: No ulcer, skin breakdown, erythema, warmth, callus or dry skin.      Left foot:      Skin integrity: No ulcer, skin breakdown, erythema, warmth, callus or dry skin.   Skin:     General: Skin is warm and dry.      Capillary Refill: Capillary refill takes less than 2 seconds.   Neurological:      Mental Status: She is alert.   Psychiatric:         Mood and Affect: Mood normal.   Diabetic Foot Exam    Patient's shoes and socks removed.    Right Foot/Ankle   Right Foot Inspection  Skin Exam: skin normal and skin intact. No dry skin, no warmth, no callus, no erythema, no maceration, no abnormal color, no pre-ulcer, no ulcer and no callus.     Toe Exam: ROM and strength within normal limits.     Sensory   Vibration: intact  Proprioception: " intact  Monofilament testing: intact    Vascular  Capillary refills: < 3 seconds  The right DP pulse is 2+. The right PT pulse is 2+.     Left Foot/Ankle  Left Foot Inspection  Skin Exam: skin normal and skin intact. No dry skin, no warmth, no erythema, no maceration, normal color, no pre-ulcer, no ulcer and no callus.     Toe Exam: ROM and strength within normal limits.     Sensory   Vibration: intact  Proprioception: intact  Monofilament testing: intact    Vascular  Capillary refills: < 3 seconds  The left DP pulse is 2+. The left PT pulse is 2+.     Assign Risk Category  No deformity present  No loss of protective sensation  No weak pulses  Risk: 0

## 2025-01-09 ENCOUNTER — RESULTS FOLLOW-UP (OUTPATIENT)
Dept: OBGYN CLINIC | Facility: CLINIC | Age: 55
End: 2025-01-09

## 2025-01-09 NOTE — ASSESSMENT & PLAN NOTE
On statin, check lipid panel, goal of LDL is less than 70          Alternatives Discussed Intro (Do Not Add Period): I discussed alternative treatments to Mohs surgery and specifically discussed the risks and benefits of

## 2025-02-06 ENCOUNTER — TELEPHONE (OUTPATIENT)
Dept: INTERNAL MEDICINE CLINIC | Facility: CLINIC | Age: 55
End: 2025-02-06

## 2025-02-06 DIAGNOSIS — E11.9 TYPE 2 DIABETES MELLITUS WITHOUT COMPLICATION, UNSPECIFIED WHETHER LONG TERM INSULIN USE (HCC): ICD-10-CM

## 2025-02-06 DIAGNOSIS — E11.9 TYPE 2 DIABETES MELLITUS WITHOUT COMPLICATION, UNSPECIFIED WHETHER LONG TERM INSULIN USE (HCC): Primary | ICD-10-CM

## 2025-02-06 RX ORDER — PROCHLORPERAZINE 25 MG/1
SUPPOSITORY RECTAL
Qty: 1 EACH | Refills: 0 | OUTPATIENT
Start: 2025-02-06

## 2025-02-06 RX ORDER — INSULIN GLARGINE 100 [IU]/ML
36 INJECTION, SOLUTION SUBCUTANEOUS
Qty: 30 ML | Refills: 1 | Status: SHIPPED | OUTPATIENT
Start: 2025-02-06 | End: 2025-07-23

## 2025-02-06 NOTE — TELEPHONE ENCOUNTER
Patient called the [facility] and wants someone to contact her because she needs help with the dexcom & checking her sugars. Patient wants a nurse to go to her house to help her. 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

## 2025-02-06 NOTE — TELEPHONE ENCOUNTER
Reason for call:   [] Refill   [] Prior Auth  [x] Other: Pt called states she lost her insulin and wants to know if we had the number to her rx plan so she can call. Went into media and gave pt the number.

## 2025-02-06 NOTE — TELEPHONE ENCOUNTER
Reason for call:   [x] Refill   [] Prior Auth  [] Other:     Office:   [x] PCP/Provider - [facility]   [] Specialty/Provider -     Continuous Glucose Transmitter (Dexcom G6 Transmitter) MISC    1 TRANSMITTED EVERY 3 MONTHS FOR CONTINUOUS GLUCOSE MONITORING   1 each    Insulin Glargine Solostar (Lantus SoloStar) 100 UNIT/ML SOPN   36 Units, Daily at bedtime   30 mL    Pharmacy: [facility][id number]    Does the patient have enough for 3 days?   [] Yes   [x] No - Send as HP to POD

## 2025-02-06 NOTE — TELEPHONE ENCOUNTER
Patient called to see if her scrit for insulin was sent in. Informed her it was sent today at 11:34 am.

## 2025-03-08 ENCOUNTER — HOSPITAL ENCOUNTER (EMERGENCY)
Facility: HOSPITAL | Age: 55
Discharge: HOME | End: 2025-03-08
Attending: EMERGENCY MEDICINE | Admitting: EMERGENCY MEDICINE
Payer: COMMERCIAL

## 2025-03-08 VITALS
HEART RATE: 94 BPM | WEIGHT: 166.23 LBS | TEMPERATURE: 99 F | SYSTOLIC BLOOD PRESSURE: 116 MMHG | BODY MASS INDEX: 27.66 KG/M2 | DIASTOLIC BLOOD PRESSURE: 66 MMHG | OXYGEN SATURATION: 98 % | RESPIRATION RATE: 18 BRPM

## 2025-03-08 DIAGNOSIS — E11.9 TYPE 2 DIABETES MELLITUS WITHOUT COMPLICATION, UNSPECIFIED WHETHER LONG TERM INSULIN USE (HCC): ICD-10-CM

## 2025-03-08 DIAGNOSIS — Z71.1 FEARED COMPLAINT WITHOUT DIAGNOSIS: Primary | ICD-10-CM

## 2025-03-08 DIAGNOSIS — F41.9 ANXIETY: ICD-10-CM

## 2025-03-08 LAB — GLUCOSE SERPL-MCNC: 146 MG/DL (ref 65–140)

## 2025-03-08 PROCEDURE — 99284 EMERGENCY DEPT VISIT MOD MDM: CPT | Performed by: PHYSICIAN ASSISTANT

## 2025-03-08 PROCEDURE — 82948 REAGENT STRIP/BLOOD GLUCOSE: CPT

## 2025-03-08 PROCEDURE — 99284 EMERGENCY DEPT VISIT MOD MDM: CPT

## 2025-03-08 RX ORDER — PROCHLORPERAZINE 25 MG/1
SUPPOSITORY RECTAL
Qty: 1 EACH | Refills: 0 | Status: SHIPPED | OUTPATIENT
Start: 2025-03-08

## 2025-03-08 NOTE — DISCHARGE INSTRUCTIONS
Follow-up with your eye specialists for ongoing evaluation of your eye concerns.  DO not use any drops, and no oral antibiotics.    Your exam was normal today, vision normal, no infection    Follow-up with PCP

## 2025-03-08 NOTE — ED PROVIDER NOTES
"Time reflects when diagnosis was documented in both MDM as applicable and the Disposition within this note       Time User Action Codes Description Comment    3/8/2025  5:20 PM Shaista Kelly Add [Z71.1] Feared complaint without diagnosis     3/8/2025  5:21 PM Shaista Kelly Add [F41.9] Anxiety     3/8/2025  5:33 PM Shaista Kelly Add [E11.9] Type 2 diabetes mellitus without complication, unspecified whether long term insulin use (HCC)           ED Disposition       ED Disposition   Discharge    Condition   Stable    Date/Time   Sat Mar 8, 2025  5:20 PM    Comment   Hyacinth Parra discharge to home/self care.                   Assessment & Plan       Medical Decision Making  PT extremely anxious about her vision, fear of blindness.  Prior noted reviewed, no evidence of previous blindness.  Today's exam is normal, visual acuities done by me are normal, no signs of infection, funduscopic exam is normal to my exam, pupils are normal, no hyphema.  Reassured patient that there are no signs of acute process to her eyes, visual acuity was normal here.  Patient has good follow-up with eye doctors, still concerned about this \"blindness around her pupils\" or \"line that she sees in the mirror\".  I reassured her that I do not see any abnormalities during my exam in the emergency department but stressed need to follow-up with a specialist.  After exam pt then c/o 2 weeks of RUQ abd pain with no other systemic symptoms, concerned that it may be side effects for her steroid drops; again reassured pt, no indication for lab work at this time  Pt also asking for refill on Dexcom transmitter - this was sent to pharmacy  Stable for dc outpt FU    Amount and/or Complexity of Data Reviewed  External Data Reviewed: labs and notes.  Labs: ordered.     Details: Normal glucose    Risk  OTC drugs.  Prescription drug management.             Medications - No data to display    ED Risk Strat Scores                            SBIRT 22yo+  " "    Flowsheet Row Most Recent Value   Initial Alcohol Screen: US AUDIT-C     1. How often do you have a drink containing alcohol? 0 Filed at: 03/08/2025 1703   2. How many drinks containing alcohol do you have on a typical day you are drinking?  0 Filed at: 03/08/2025 1703   3b. FEMALE Any Age, or MALE 65+: How often do you have 4 or more drinks on one occassion? 0 Filed at: 03/08/2025 1703   Audit-C Score 0 Filed at: 03/08/2025 1703   QUE: How many times in the past year have you...    Used an illegal drug or used a prescription medication for non-medical reasons? Never Filed at: 03/08/2025 1703                            History of Present Illness       Chief Complaint   Patient presents with    Blurred Vision     Patient c/o blurred vision that started \"a while ago\". Pt states, \"Its like a white cloud in front of my eyes\". Pt was directed to follow up with Ophthalmology, but hasn't been able to see them yet.        Past Medical History:   Diagnosis Date    Asthma     Depression     Diabetes mellitus (HCC)     type 2    Hyperlipidemia     Hypertension       Past Surgical History:   Procedure Laterality Date    KNEE ARTHROCENTESIS      MOUTH SURGERY        Family History   Problem Relation Age of Onset    Diabetes Mother     Diabetes Father     Ovarian cancer Sister     Colon cancer Brother       Social History     Tobacco Use    Smoking status: Every Day     Current packs/day: 1.00     Average packs/day: 1 pack/day for 35.0 years (35.0 ttl pk-yrs)     Types: Cigarettes    Smokeless tobacco: Never   Vaping Use    Vaping status: Never Used   Substance Use Topics    Alcohol use: Not Currently    Drug use: Never      E-Cigarette/Vaping    E-Cigarette Use Never User       E-Cigarette/Vaping Substances    THC No     CBD No       I have reviewed and agree with the history as documented.     PMH: Rectocele, Asthma, Bipolar disorder, HTN, Hyperlipidemia, Acute gastritis without hemorrhage, DM,   PSH: Knee arthrocentesis, " "Mouth surgery    Pt presents to ED c/o fear of blindness, states \"she lost her vision\" few months ago suddenly for few months (although notes show she was seen by eye doctors and had vision), but then it's coming back, going in/out, sees \"blindness around pupil-that she can see in the mirror\" and is very anxious about losing her vision.  Pt explained she went to eye doctor, they can her steroid eye drops, that burned and made her vision worse, so she stopped, then she checked the side effects and is worried about her kidneys and liver.  PT states another provider told her it was an infection, so she took some oral antibiotics that she had at home and now she's better.  Also worried about blindness because of her long standing diabetes.  Pt denies fever, rash, discharge, other systemic complaints    Prior notes show pt diagnosed with: Stromal corneal dystrophy - which is known, not acute and pt has FU for this                      Review of Systems   Constitutional:  Negative for fever.   Eyes:  Positive for visual disturbance. Negative for photophobia, pain, discharge, redness and itching.   Skin:  Negative for rash.   All other systems reviewed and are negative.          Objective       ED Triage Vitals [03/08/25 1659]   Temperature Pulse Blood Pressure Respirations SpO2 Patient Position - Orthostatic VS   99 °F (37.2 °C) 94 116/66 18 98 % Lying      Temp Source Heart Rate Source BP Location FiO2 (%) Pain Score    Oral Monitor Left arm -- --      Vitals      Date and Time Temp Pulse SpO2 Resp BP Pain Score FACES Pain Rating User   03/08/25 1659 99 °F (37.2 °C) 94 98 % 18 116/66 -- -- DG            Physical Exam  Vitals and nursing note reviewed.   Constitutional:       General: She is not in acute distress.     Appearance: She is well-developed.   HENT:      Head: Normocephalic and atraumatic.      Right Ear: External ear normal.      Left Ear: External ear normal.      Nose: Nose normal. No congestion or " rhinorrhea.      Mouth/Throat:      Mouth: Mucous membranes are moist.      Pharynx: Oropharynx is clear.   Eyes:      General: Lids are normal. Vision grossly intact. No visual field deficit or scleral icterus.        Right eye: No discharge.         Left eye: No discharge.      Extraocular Movements: Extraocular movements intact.      Conjunctiva/sclera: Conjunctivae normal.      Right eye: Right conjunctiva is not injected. No chemosis, exudate or hemorrhage.     Left eye: Left conjunctiva is not injected. No chemosis, exudate or hemorrhage.     Pupils: Pupils are equal, round, and reactive to light.      Comments: Visual acquity done by me, OU 20/20, OD  20/20, OS 20/20   Cardiovascular:      Rate and Rhythm: Normal rate.   Pulmonary:      Effort: Pulmonary effort is normal. No respiratory distress.   Musculoskeletal:         General: Normal range of motion.      Cervical back: Normal range of motion and neck supple.   Lymphadenopathy:      Cervical: No cervical adenopathy.   Skin:     General: Skin is warm and dry.      Findings: No erythema or rash.   Neurological:      Mental Status: She is alert and oriented to person, place, and time.   Psychiatric:      Comments: Extremely anxious with pressured speech, but cooperative, able to understand         Results Reviewed       Procedure Component Value Units Date/Time    Fingerstick Glucose (POCT) [[id number]]  (Abnormal) Collected: 03/08/25 1656    Lab Status: Final result Specimen: Blood Updated: 03/08/25 1657     POC Glucose 146 mg/dl             No orders to display       Procedures    ED Medication and Procedure Management   Prior to Admission Medications   Prescriptions Last Dose Informant Patient Reported? Taking?   ALPRAZolam (XANAX) 0.5 mg tablet   No No   Sig: Take 1 tablet (0.5 mg total) by mouth daily at bedtime as needed for sleep or anxiety   BD Pen Needle Maile 2nd Gen 32G X 4 MM MISC   No No   Sig: Inject under the skin in the morning Use as directed    Blood Glucose Monitoring Suppl (ONE TOUCH ULTRA 2) w/Device KIT   No No   Sig: Check blood sugars three times daily. Please substitute with appropriate alternative as covered by patient's insurance. Dx: E11.65   Blood Glucose Monitoring Suppl (OneTouch Verio Reflect) w/Device KIT   No No   Sig: Check blood sugars three times daily. Please substitute with appropriate alternative as covered by patient's insurance. Dx: E11.65   Cholecalciferol (Vitamin D3) 50 MCG (2000 UT) TABS   No No   Sig: Take 1 tablet (2,000 Units total) by mouth daily   Continuous Glucose Sensor (Dexcom G6 Sensor) MISC   No No   Sig: 3 PACK SENSOR FOR CONTINUOUS GLUCOSE MONITORING   Continuous Glucose Transmitter (Dexcom G6 Transmitter) MISC   No No   Si TRANSMITTED EVERY 3 MONTHS FOR CONTINUOUS GLUCOSE MONITORING   Continuous Glucose Transmitter (Dexcom G6 Transmitter) MISC   No Yes   Si TRANSMITTED EVERY 3 MONTHS FOR CONTINUOUS GLUCOSE MONITORING   Insulin Glargine Solostar (Lantus SoloStar) 100 UNIT/ML SOPN   No No   Sig: Inject 0.36 mL (36 Units total) as directed daily at bedtime   Insulin Pen Needle (BD Pen Needle Maile U/F) 32G X 4 MM MISC   No No   Sig: Use daily as directed with insulin pen   Patient not taking: Reported on 2025   Multiple Vitamins-Minerals (ONE-A-DAY FOR HER VITACRAVES PO)   Yes No   Sig: Take by mouth   OneTouch Delica Lancets 33G MISC   No No   Sig: Check blood sugars three times daily. Please substitute with appropriate alternative as covered by patient's insurance. Dx: E11.65   Patient not taking: Reported on 2025   QUEtiapine (SEROquel) 400 MG tablet   No No   Sig: TAKE 1 TABLET (400 MG TOTAL) BY MOUTH DAILY AT BEDTIME   aspirin (Aspirin Low Dose) 81 mg EC tablet   No No   Sig: TAKE 1 TABLET BY MOUTH EVERY DAY   clotrimazole-betamethasone (LOTRISONE) 1-0.05 % cream   No No   Sig: Apply topically 2 (two) times a day for 7 days To affected areas of vulva for itching.   glucose blood (OneTouch Ultra)  test strip   No No   Sig: Check blood sugars three times daily. Please substitute with appropriate alternative as covered by patient's insurance. Dx: E11.65   Patient not taking: Reported on 11/5/2024   metFORMIN (GLUCOPHAGE) 1000 MG tablet   No No   Sig: Take 1 tablet (1,000 mg total) by mouth 2 (two) times a day with meals   pantoprazole (PROTONIX) 20 mg tablet   No No   Sig: TAKE 1 TABLET BY MOUTH DAILY BEFORE BREAKFAST.   ramipril (ALTACE) 2.5 mg capsule   No No   Sig: Take 1 capsule (2.5 mg total) by mouth daily   rosuvastatin (CRESTOR) 20 MG tablet   No No   Sig: Take 1 tablet (20 mg total) by mouth daily      Facility-Administered Medications: None     Discharge Medication List as of 3/8/2025  5:34 PM        CONTINUE these medications which have CHANGED    Details   Continuous Glucose Transmitter (Dexcom G6 Transmitter) MISC 1 TRANSMITTED EVERY 3 MONTHS FOR CONTINUOUS GLUCOSE MONITORING, Normal           CONTINUE these medications which have NOT CHANGED    Details   ALPRAZolam (XANAX) 0.5 mg tablet Take 1 tablet (0.5 mg total) by mouth daily at bedtime as needed for sleep or anxiety, Starting Wed 1/8/2025, Until Fri 2/7/2025 at 2359, Normal      aspirin (Aspirin Low Dose) 81 mg EC tablet TAKE 1 TABLET BY MOUTH EVERY DAY, Starting Thu 10/17/2024, Normal      BD Pen Needle Maile 2nd Gen 32G X 4 MM MISC Inject under the skin in the morning Use as directed, Starting Thu 7/11/2024, Until Wed 1/8/2025, Normal      !! Blood Glucose Monitoring Suppl (ONE TOUCH ULTRA 2) w/Device KIT Check blood sugars three times daily. Please substitute with appropriate alternative as covered by patient's insurance. Dx: E11.65, Normal      !! Blood Glucose Monitoring Suppl (OneTouch Verio Reflect) w/Device KIT Check blood sugars three times daily. Please substitute with appropriate alternative as covered by patient's insurance. Dx: E11.65, Normal      Cholecalciferol (Vitamin D3) 50 MCG (2000 UT) TABS Take 1 tablet (2,000 Units total)  by mouth daily, Starting Fri 4/26/2024, Until Wed 1/8/2025, Normal      clotrimazole-betamethasone (LOTRISONE) 1-0.05 % cream Apply topically 2 (two) times a day for 7 days To affected areas of vulva for itching., Starting Fri 12/1/2023, Until Fri 12/8/2023, Normal      Continuous Glucose Sensor (Dexcom G6 Sensor) MISC 3 PACK SENSOR FOR CONTINUOUS GLUCOSE MONITORING, Normal      glucose blood (OneTouch Ultra) test strip Check blood sugars three times daily. Please substitute with appropriate alternative as covered by patient's insurance. Dx: E11.65, Normal      Insulin Glargine Solostar (Lantus SoloStar) 100 UNIT/ML SOPN Inject 0.36 mL (36 Units total) as directed daily at bedtime, Starting Thu 2/6/2025, Until Wed 7/23/2025, Normal      Insulin Pen Needle (BD Pen Needle Maile U/F) 32G X 4 MM MISC Use daily as directed with insulin pen, Normal      metFORMIN (GLUCOPHAGE) 1000 MG tablet Take 1 tablet (1,000 mg total) by mouth 2 (two) times a day with meals, Starting Thu 12/19/2024, Until Wed 3/19/2025, Normal      Multiple Vitamins-Minerals (ONE-A-DAY FOR HER VITACRAVES PO) Take by mouth, Historical Med      OneTouch Delica Lancets 33G MISC Check blood sugars three times daily. Please substitute with appropriate alternative as covered by patient's insurance. Dx: E11.65, Normal      pantoprazole (PROTONIX) 20 mg tablet TAKE 1 TABLET BY MOUTH DAILY BEFORE BREAKFAST., Starting Tue 12/24/2024, Normal      QUEtiapine (SEROquel) 400 MG tablet TAKE 1 TABLET (400 MG TOTAL) BY MOUTH DAILY AT BEDTIME, Starting Mon 12/16/2024, Normal      ramipril (ALTACE) 2.5 mg capsule Take 1 capsule (2.5 mg total) by mouth daily, Starting Wed 1/8/2025, Until Mon 7/7/2025, Normal      rosuvastatin (CRESTOR) 20 MG tablet Take 1 tablet (20 mg total) by mouth daily, Starting Wed 1/8/2025, Normal       !! - Potential duplicate medications found. Please discuss with provider.        No discharge procedures on file.  ED SEPSIS DOCUMENTATION   Time  reflects when diagnosis was documented in both MDM as applicable and the Disposition within this note       Time User Action Codes Description Comment    3/8/2025  5:20 PM Shaista Kelly [Z71.1] Feared complaint without diagnosis     3/8/2025  5:21 PM Shaista Kelly [F41.9] Anxiety     3/8/2025  5:33 PM Shaista Kelly [E11.9] Type 2 diabetes mellitus without complication, unspecified whether long term insulin use (HCC)                  Shaista Kelly PA-C  03/08/25 1747       Shaista Kelly PA-C  03/08/25 175

## 2025-03-18 ENCOUNTER — OFFICE VISIT (OUTPATIENT)
Dept: OBGYN CLINIC | Facility: CLINIC | Age: 55
End: 2025-03-18
Payer: COMMERCIAL

## 2025-03-18 VITALS
SYSTOLIC BLOOD PRESSURE: 130 MMHG | BODY MASS INDEX: 27.49 KG/M2 | DIASTOLIC BLOOD PRESSURE: 84 MMHG | HEIGHT: 65 IN | WEIGHT: 165 LBS

## 2025-03-18 DIAGNOSIS — Z11.3 SCREEN FOR STD (SEXUALLY TRANSMITTED DISEASE): ICD-10-CM

## 2025-03-18 DIAGNOSIS — N95.2 ATROPHIC VAGINITIS: ICD-10-CM

## 2025-03-18 DIAGNOSIS — R39.9 UTI SYMPTOMS: Primary | ICD-10-CM

## 2025-03-18 LAB
SL AMB  POCT GLUCOSE, UA: ABNORMAL
SL AMB LEUKOCYTE ESTERASE,UA: ABNORMAL
SL AMB POCT BILIRUBIN,UA: ABNORMAL
SL AMB POCT BLOOD,UA: ABNORMAL
SL AMB POCT CLARITY,UA: CLEAR
SL AMB POCT COLOR,UA: ABNORMAL
SL AMB POCT KETONES,UA: ABNORMAL
SL AMB POCT NITRITE,UA: ABNORMAL
SL AMB POCT PH,UA: 5
SL AMB POCT SPECIFIC GRAVITY,UA: 1.01
SL AMB POCT URINE PROTEIN: ABNORMAL
SL AMB POCT UROBILINOGEN: 0.2

## 2025-03-18 PROCEDURE — 99213 OFFICE O/P EST LOW 20 MIN: CPT | Performed by: PHYSICIAN ASSISTANT

## 2025-03-18 PROCEDURE — 87591 N.GONORRHOEAE DNA AMP PROB: CPT | Performed by: PHYSICIAN ASSISTANT

## 2025-03-18 PROCEDURE — 87491 CHLMYD TRACH DNA AMP PROBE: CPT | Performed by: PHYSICIAN ASSISTANT

## 2025-03-18 PROCEDURE — 81002 URINALYSIS NONAUTO W/O SCOPE: CPT | Performed by: PHYSICIAN ASSISTANT

## 2025-03-18 RX ORDER — PREDNISOLONE ACETATE 10 MG/ML
SUSPENSION/ DROPS OPHTHALMIC
COMMUNITY
Start: 2025-02-02

## 2025-03-18 RX ORDER — ESTRADIOL 0.1 MG/G
1 CREAM VAGINAL 2 TIMES WEEKLY
Qty: 42.5 G | Refills: 1 | Status: SHIPPED | OUTPATIENT
Start: 2025-03-20

## 2025-03-18 NOTE — PROGRESS NOTES
Assessment/Plan:      Diagnoses and all orders for this visit:    UTI symptoms  -     POCT urine dip  -     Urine culture    Atrophic vaginitis  -     estradiol (ESTRACE VAGINAL) 0.1 mg/g vaginal cream; Insert 1 g into the vagina 2 (two) times a week    Screen for STD (sexually transmitted disease)  -     Chlamydia/GC amplified DNA by PCR    Other orders  -     prednisoLONE acetate (PRED FORTE) 1 % ophthalmic suspension; PLEASE SEE ATTACHED FOR DETAILED DIRECTIONS     54-year-old female presenting today for acute problem visit for concern of 2 days of dysuria, frequency, urgency as in HPI.  Started after using soap in the shower.  Newer sexual partner with GC CT and STD screening blood work in November all negative.  False positive RPR with confirmatory testing negative.  Patient reassured.    Exam today relatively unremarkable but general dryness and mild atrophic change noted.  Patient noted taking 1 dose of an antibiotic she had leftover from a previous prescription and is starting to feel better already.    Patient had similar symptoms with negative urine culture, negative GC CT and negative vaginosis probe back in November.  After further questioning she does admit to intermittent dryness and dyspareunia and irritation which could be attributed to postmenopausal status and atrophic changes.    Patient agreeable to awaiting test results of GC CT and urine culture if acute treatment is needed.  Discussed risks and benefit of using Estrace cream applied to urethral opening, inner labia and intravaginally for benefit of symptom especially if symptoms unrelated to infection.  Patient agreeable and will have patient use 1 g per vagina with small amount also applied externally over urethral meatus and bilateral inner labia minora and the rest intravaginal via suppository twice weekly.    Patient has planned annual exam scheduled in April and will follow-up with her at that time.  Patient we will call sooner with  test results and treat accordingly.  Patient expresses understanding and agreeable to plan.    Chief Complaint   Patient presents with    Urinary Tract Infection     Burning when she urinates, irritation and blood in urine. Pt complains that her vagina is swollen.        Subjective:     Patient ID: Hyacinth Parra is a 54 y.o. female.    53y/o female here today for acute urinary/vaginal sxs.    Started with sxs 2 days ago.  States took shower and washed with soap and then started burning w/ urination.  States difficulty holding urine, noticing some blood only when urinating. Also burning just with urination.  She states she had extra antibiotic left over at home and took it before coming here and feeling better already.  No vaginal sxs of abnormal discharge, odor or itching.     Newer sexual partner - desire STD screening.     No pelvic pain or vaginal bleeding.   No fever, chills or flank pain.            Review of Systems   Constitutional: Negative.  Negative for chills and fever.   Gastrointestinal:  Negative for abdominal pain.   Genitourinary:  Positive for dysuria, frequency, hematuria and urgency. Negative for difficulty urinating, vaginal bleeding, vaginal discharge and vaginal pain.        Admits to chronic dryness, dyspareunia         The following portions of the patient's history were reviewed and updated as appropriate: allergies, current medications, past family history, past medical history, past social history, past surgical history, and problem list.      Objective:     Physical Exam  Vitals reviewed.   Constitutional:       Appearance: Normal appearance. She is not ill-appearing.   Genitourinary:     General: Normal vulva.      Labia:         Right: No rash, tenderness or lesion.         Left: No rash, tenderness or lesion.       Urethra: No urethral pain, urethral swelling or urethral lesion.      Vagina: Normal. No signs of injury. No vaginal discharge, erythema, tenderness, bleeding or lesions.       Cervix: Normal. No cervical motion tenderness, discharge, friability, lesion, erythema or cervical bleeding.      Uterus: Not tender.    Lymphadenopathy:      Lower Body: No right inguinal adenopathy. No left inguinal adenopathy.   Neurological:      Mental Status: She is alert and oriented to person, place, and time.   Psychiatric:         Mood and Affect: Mood normal.         Behavior: Behavior normal. Behavior is cooperative.

## 2025-03-19 DIAGNOSIS — Z76.0 ENCOUNTER FOR MEDICATION REFILL: ICD-10-CM

## 2025-03-19 LAB
C TRACH DNA SPEC QL NAA+PROBE: NEGATIVE
N GONORRHOEA DNA SPEC QL NAA+PROBE: NEGATIVE

## 2025-03-19 RX ORDER — QUETIAPINE FUMARATE 400 MG/1
400 TABLET, FILM COATED ORAL
Qty: 90 TABLET | Refills: 0 | Status: SHIPPED | OUTPATIENT
Start: 2025-03-19

## 2025-03-21 ENCOUNTER — RESULTS FOLLOW-UP (OUTPATIENT)
Dept: OBGYN CLINIC | Facility: CLINIC | Age: 55
End: 2025-03-21

## 2025-03-21 NOTE — TELEPHONE ENCOUNTER
Patient returned call. Reviewed provider recommendations. Patient asking why she is still having burning if results are normal. States the bleeding has stopped, but asking why she had bleeding as well. Advised will send msg to provider for advice.

## 2025-04-13 DIAGNOSIS — I10 PRIMARY HYPERTENSION: ICD-10-CM

## 2025-04-14 RX ORDER — ASPIRIN 81 MG/1
81 TABLET, COATED ORAL DAILY
Qty: 90 TABLET | Refills: 1 | Status: SHIPPED | OUTPATIENT
Start: 2025-04-14

## 2025-04-19 ENCOUNTER — HOSPITAL ENCOUNTER (EMERGENCY)
Facility: HOSPITAL | Age: 55
Discharge: HOME | End: 2025-04-20
Attending: EMERGENCY MEDICINE
Payer: COMMERCIAL

## 2025-04-19 ENCOUNTER — APPOINTMENT (EMERGENCY)
Dept: CT IMAGING | Facility: HOSPITAL | Age: 55
End: 2025-04-19
Payer: COMMERCIAL

## 2025-04-19 VITALS
HEART RATE: 100 BPM | TEMPERATURE: 98.4 F | RESPIRATION RATE: 18 BRPM | SYSTOLIC BLOOD PRESSURE: 112 MMHG | DIASTOLIC BLOOD PRESSURE: 70 MMHG | OXYGEN SATURATION: 99 %

## 2025-04-19 DIAGNOSIS — K76.9 LESION OF RIGHT LOBE OF LIVER: Primary | ICD-10-CM

## 2025-04-19 LAB
ALBUMIN SERPL BCG-MCNC: 4.4 G/DL (ref 3.5–5)
ALP SERPL-CCNC: 90 U/L (ref 34–104)
ALT SERPL W P-5'-P-CCNC: 27 U/L (ref 7–52)
AMPHETAMINES SERPL QL SCN: NEGATIVE
ANION GAP SERPL CALCULATED.3IONS-SCNC: 9 MMOL/L (ref 4–13)
AST SERPL W P-5'-P-CCNC: 20 U/L (ref 13–39)
BACTERIA UR QL AUTO: ABNORMAL /HPF
BARBITURATES UR QL: NEGATIVE
BASOPHILS # BLD AUTO: 0.05 THOUSANDS/ÂΜL (ref 0–0.1)
BASOPHILS NFR BLD AUTO: 1 % (ref 0–1)
BENZODIAZ UR QL: NEGATIVE
BILIRUB DIRECT SERPL-MCNC: 0.08 MG/DL (ref 0–0.2)
BILIRUB SERPL-MCNC: 0.4 MG/DL (ref 0.2–1)
BILIRUB UR QL STRIP: NEGATIVE
BUN SERPL-MCNC: 12 MG/DL (ref 5–25)
CALCIUM SERPL-MCNC: 9.3 MG/DL (ref 8.4–10.2)
CHLORIDE SERPL-SCNC: 104 MMOL/L (ref 96–108)
CLARITY UR: CLEAR
CO2 SERPL-SCNC: 26 MMOL/L (ref 21–32)
COCAINE UR QL: NEGATIVE
COLOR UR: YELLOW
CREAT SERPL-MCNC: 0.68 MG/DL (ref 0.6–1.3)
EOSINOPHIL # BLD AUTO: 0.15 THOUSAND/ÂΜL (ref 0–0.61)
EOSINOPHIL NFR BLD AUTO: 2 % (ref 0–6)
ERYTHROCYTE [DISTWIDTH] IN BLOOD BY AUTOMATED COUNT: 14.3 % (ref 11.6–15.1)
EXT PREGNANCY TEST URINE: NEGATIVE
EXT. CONTROL: NORMAL
FENTANYL UR QL SCN: NEGATIVE
GFR SERPL CREATININE-BSD FRML MDRD: 100 ML/MIN/1.73SQ M
GLUCOSE SERPL-MCNC: 150 MG/DL (ref 65–140)
GLUCOSE UR STRIP-MCNC: NEGATIVE MG/DL
HCG SERPL QL: NEGATIVE
HCT VFR BLD AUTO: 41.4 % (ref 34.8–46.1)
HGB BLD-MCNC: 13.1 G/DL (ref 11.5–15.4)
HGB UR QL STRIP.AUTO: NEGATIVE
HYDROCODONE UR QL SCN: NEGATIVE
IMM GRANULOCYTES # BLD AUTO: 0.04 THOUSAND/UL (ref 0–0.2)
IMM GRANULOCYTES NFR BLD AUTO: 0 % (ref 0–2)
KETONES UR STRIP-MCNC: NEGATIVE MG/DL
LEUKOCYTE ESTERASE UR QL STRIP: ABNORMAL
LIPASE SERPL-CCNC: 29 U/L (ref 11–82)
LYMPHOCYTES # BLD AUTO: 1.67 THOUSANDS/ÂΜL (ref 0.6–4.47)
LYMPHOCYTES NFR BLD AUTO: 16 % (ref 14–44)
MCH RBC QN AUTO: 25.7 PG (ref 26.8–34.3)
MCHC RBC AUTO-ENTMCNC: 31.6 G/DL (ref 31.4–37.4)
MCV RBC AUTO: 81 FL (ref 82–98)
METHADONE UR QL: NEGATIVE
MONOCYTES # BLD AUTO: 0.47 THOUSAND/ÂΜL (ref 0.17–1.22)
MONOCYTES NFR BLD AUTO: 5 % (ref 4–12)
MUCOUS THREADS UR QL AUTO: ABNORMAL
NEUTROPHILS # BLD AUTO: 7.8 THOUSANDS/ÂΜL (ref 1.85–7.62)
NEUTS SEG NFR BLD AUTO: 76 % (ref 43–75)
NITRITE UR QL STRIP: NEGATIVE
NON-SQ EPI CELLS URNS QL MICRO: ABNORMAL /HPF
NRBC BLD AUTO-RTO: 0 /100 WBCS
OPIATES UR QL SCN: NEGATIVE
OXYCODONE+OXYMORPHONE UR QL SCN: NEGATIVE
PCP UR QL: NEGATIVE
PH UR STRIP.AUTO: 6 [PH]
PLATELET # BLD AUTO: 184 THOUSANDS/UL (ref 149–390)
PMV BLD AUTO: 10 FL (ref 8.9–12.7)
POTASSIUM SERPL-SCNC: 3.6 MMOL/L (ref 3.5–5.3)
PROT SERPL-MCNC: 6.7 G/DL (ref 6.4–8.4)
PROT UR STRIP-MCNC: NEGATIVE MG/DL
RBC # BLD AUTO: 5.09 MILLION/UL (ref 3.81–5.12)
RBC #/AREA URNS AUTO: ABNORMAL /HPF
SODIUM SERPL-SCNC: 139 MMOL/L (ref 135–147)
SP GR UR STRIP.AUTO: 1.01 (ref 1–1.03)
THC UR QL: NEGATIVE
UROBILINOGEN UR QL STRIP.AUTO: 0.2 E.U./DL
WBC # BLD AUTO: 10.18 THOUSAND/UL (ref 4.31–10.16)
WBC #/AREA URNS AUTO: ABNORMAL /HPF

## 2025-04-19 PROCEDURE — 81001 URINALYSIS AUTO W/SCOPE: CPT | Performed by: EMERGENCY MEDICINE

## 2025-04-19 PROCEDURE — 80307 DRUG TEST PRSMV CHEM ANLYZR: CPT | Performed by: EMERGENCY MEDICINE

## 2025-04-19 PROCEDURE — 85025 COMPLETE CBC W/AUTO DIFF WBC: CPT | Performed by: EMERGENCY MEDICINE

## 2025-04-19 PROCEDURE — 87086 URINE CULTURE/COLONY COUNT: CPT | Performed by: EMERGENCY MEDICINE

## 2025-04-19 PROCEDURE — 74177 CT ABD & PELVIS W/CONTRAST: CPT

## 2025-04-19 PROCEDURE — 83690 ASSAY OF LIPASE: CPT | Performed by: EMERGENCY MEDICINE

## 2025-04-19 PROCEDURE — 80048 BASIC METABOLIC PNL TOTAL CA: CPT | Performed by: EMERGENCY MEDICINE

## 2025-04-19 PROCEDURE — 84703 CHORIONIC GONADOTROPIN ASSAY: CPT | Performed by: EMERGENCY MEDICINE

## 2025-04-19 PROCEDURE — 99285 EMERGENCY DEPT VISIT HI MDM: CPT | Performed by: EMERGENCY MEDICINE

## 2025-04-19 PROCEDURE — 81025 URINE PREGNANCY TEST: CPT | Performed by: EMERGENCY MEDICINE

## 2025-04-19 PROCEDURE — 80076 HEPATIC FUNCTION PANEL: CPT | Performed by: EMERGENCY MEDICINE

## 2025-04-19 PROCEDURE — 36415 COLL VENOUS BLD VENIPUNCTURE: CPT | Performed by: EMERGENCY MEDICINE

## 2025-04-19 PROCEDURE — 99284 EMERGENCY DEPT VISIT MOD MDM: CPT

## 2025-04-19 RX ADMIN — IOHEXOL 100 ML: 350 INJECTION, SOLUTION INTRAVENOUS at 23:32
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
## 2025-04-20 NOTE — ED NOTES
Report received from previous shift. Pt is resting at this time.     Mandie Carey, RN  04/19/25 0420

## 2025-04-20 NOTE — DISCHARGE INSTRUCTIONS
Please contact your primary care provider in the next 2 to 3 days to arrange a follow-up appointment after your ER visit.  Your CT scan showed evidence of a 3 cm right liver lobe lesion, and this finding will need follow-up and additional testing including possible MRI.  I provided a referral for follow-up with liver specialist/hepatology, a subspecialty of gastroenterology.  Please contact the gastroenterology office to arrange follow-up with the liver specialist.

## 2025-04-21 LAB — BACTERIA UR CULT: NORMAL

## 2025-04-22 ENCOUNTER — NURSE TRIAGE (OUTPATIENT)
Age: 55
End: 2025-04-22

## 2025-04-22 ENCOUNTER — TELEPHONE (OUTPATIENT)
Age: 55
End: 2025-04-22

## 2025-04-22 NOTE — TELEPHONE ENCOUNTER
Pt. In ER for upper abdominal pain, pt. Found to have a liver lesion, pt. Calling to schedule appointment, unable to schedule an urgent follow up with hepatology at any office until July, pt. Did not want to wait that long, advised message would be sent to office to try an schedule a sooner appointment

## 2025-04-22 NOTE — TELEPHONE ENCOUNTER
Patient contacted office to arrange ED follow up for liver lesion. Patient having pain post ED discharge. Call transferred to nurse triage to further assist.

## 2025-04-22 NOTE — TELEPHONE ENCOUNTER
"FOLLOW UP: Follow up ED [facility] 4/23/25    REASON FOR CONVERSATION: Abdominal Pain    SYMPTOMS: Patient called to follow up to ED. She is having pain in liver area 7/10. She is worried about waiting till June to see specialist. She is expressing a lot of fear. She is aware there was an abnormal finding on the CT scan. She does not want to return to ED due to the pain she is having.   She mentioned she thought someone might be poisoning her.     OTHER: She would like to review CT scan and lab work at [facility]    DISPOSITION: See Today in Office-given OV in am due to time of day and patient request.    Reason for Disposition   MODERATE pain (e.g., interferes with normal activities that comes and goes (cramps) lasts > 24 hours  (Exception: Pain with Vomiting or Diarrhea - see that Protocol.)    Answer Assessment - Initial Assessment Questions  1. LOCATION: \"Where does it hurt?\"       Right lower abdomen   2. RADIATION: \"Does the pain shoot anywhere else?\" (e.g., chest, back)      To back and left abdomen   3. ONSET: \"When did the pain begin?\" (e.g., minutes, hours or days ago)       Getting worse today   4. SUDDEN: \"Gradual or sudden onset?\"   Gradual   5. PATTERN \"Does the pain come and go, or is it constant?\"      Constant   6. SEVERITY: \"How bad is the pain?\"  (e.g., Scale 1-10; mild, moderate, or severe)      7/10   7. RECURRENT SYMPTOM: \"Have you ever had this type of stomach pain before?\" If Yes, ask: \"When was the last time?\" and \"What happened that time?\"       yes  8. CAUSE: \"What do you think is causing the stomach pain?\"      Enlarged liver  9. RELIEVING/AGGRAVATING FACTORS: \"What makes it better or worse?\" (e.g., antacids, bending or twisting motion, bowel movement)      no  10. OTHER SYMPTOMS: \"Do you have any other symptoms?\" (e.g., back pain, diarrhea, fever, urination pain, vomiting)        Deep breath makes it worse or taking medication   11. PREGNANCY: \"Is there any chance you are pregnant?\" \"When was your last " "menstrual period?\"        Post menopausal    Protocols used: Abdominal Pain - Female-Adult-[place]    "

## 2025-04-23 ENCOUNTER — OFFICE VISIT (OUTPATIENT)
Dept: INTERNAL MEDICINE CLINIC | Facility: CLINIC | Age: 55
End: 2025-04-23
Payer: COMMERCIAL

## 2025-04-23 ENCOUNTER — TELEPHONE (OUTPATIENT)
Age: 55
End: 2025-04-23

## 2025-04-23 VITALS
BODY MASS INDEX: 27.32 KG/M2 | OXYGEN SATURATION: 99 % | WEIGHT: 164 LBS | HEART RATE: 89 BPM | TEMPERATURE: 97.7 F | SYSTOLIC BLOOD PRESSURE: 110 MMHG | HEIGHT: 65 IN | DIASTOLIC BLOOD PRESSURE: 70 MMHG | RESPIRATION RATE: 16 BRPM

## 2025-04-23 DIAGNOSIS — E11.65 TYPE 2 DIABETES MELLITUS WITH HYPERGLYCEMIA, WITH LONG-TERM CURRENT USE OF INSULIN (HCC): ICD-10-CM

## 2025-04-23 DIAGNOSIS — I10 HYPERTENSION, UNSPECIFIED TYPE: ICD-10-CM

## 2025-04-23 DIAGNOSIS — Z79.4 TYPE 2 DIABETES MELLITUS WITH HYPERGLYCEMIA, WITH LONG-TERM CURRENT USE OF INSULIN (HCC): ICD-10-CM

## 2025-04-23 DIAGNOSIS — Z76.0 ENCOUNTER FOR MEDICATION REFILL: ICD-10-CM

## 2025-04-23 DIAGNOSIS — K76.9 LIVER LESION, RIGHT LOBE: Primary | ICD-10-CM

## 2025-04-23 DIAGNOSIS — M94.0 COSTOCHONDRITIS: ICD-10-CM

## 2025-04-23 PROCEDURE — 99213 OFFICE O/P EST LOW 20 MIN: CPT

## 2025-04-23 RX ORDER — RAMIPRIL 2.5 MG/1
2.5 CAPSULE ORAL DAILY
Qty: 90 CAPSULE | Refills: 1 | Status: SHIPPED | OUTPATIENT
Start: 2025-04-23 | End: 2025-10-20

## 2025-04-23 RX ORDER — IBUPROFEN 200 MG
400 TABLET ORAL EVERY 6 HOURS PRN
Qty: 14 TABLET | Refills: 0 | Status: SHIPPED | OUTPATIENT
Start: 2025-04-23 | End: 2025-04-23

## 2025-04-23 RX ORDER — LIDOCAINE 50 MG/G
1 PATCH TOPICAL DAILY
Qty: 14 PATCH | Refills: 0 | Status: SHIPPED | OUTPATIENT
Start: 2025-04-23

## 2025-04-23 NOTE — TELEPHONE ENCOUNTER
"Unable to leave voicemail message today @ [phone] due to Pt's vm mailbox not being set up.  Pt scheduled for an appointment with Dr. Grimaldo at [address] [place] [address] office location on 5/5/25.  Pt placed on \"waiting list\" should a sooner appointment become available.  Appointment letter sent to Pt's email listed in Pt's chart.  Appointment letter mailed to Pt's address listed in chart.   "

## 2025-04-24 NOTE — ASSESSMENT & PLAN NOTE
Lab Results   Component Value Date    HGBA1C 8.3 (H) 01/03/2025     Maintained on lantus 30 units qhs  Patient not consistently monitoring blood sugar at home  Patient asked to monitor blood sugar with test strips and record them for next visit for further diabetes management

## 2025-05-05 ENCOUNTER — OFFICE VISIT (OUTPATIENT)
Age: 55
End: 2025-05-05
Payer: COMMERCIAL

## 2025-05-05 VITALS
HEART RATE: 74 BPM | WEIGHT: 167.2 LBS | DIASTOLIC BLOOD PRESSURE: 80 MMHG | SYSTOLIC BLOOD PRESSURE: 112 MMHG | BODY MASS INDEX: 27.86 KG/M2 | HEIGHT: 65 IN

## 2025-05-05 DIAGNOSIS — R10.13 DYSPEPSIA: ICD-10-CM

## 2025-05-05 DIAGNOSIS — R93.2 ABNORMAL FINDING ON IMAGING OF LIVER: Primary | ICD-10-CM

## 2025-05-05 PROCEDURE — 99244 OFF/OP CNSLTJ NEW/EST MOD 40: CPT | Performed by: STUDENT IN AN ORGANIZED HEALTH CARE EDUCATION/TRAINING PROGRAM

## 2025-05-05 NOTE — PROGRESS NOTES
Name: Hyacinth Parra      : 1970      MRN: [id number]  Encounter Provider: Sharee Grimaldo MD  Encounter Date: 2025   Encounter department: [facility] GASTR[facility]ENTEROLOGY [address]  :  Assessment & Plan  Abnormal finding on imaging of liver  Reviewed the patient's EMR. She had CT AP w contrast which revealed an incidental finding of an indeterminate hypodense lesion.  This is likely a hemangioma.  We will order an MRI, hepatoma protocol, to evaluate the liver lesion.  If confirmed hemangioma on imaging, we will not need to follow this, and the patient may follow-up with her PCP.  The patient agrees with this treatment plan.      We discussed the liver lesion is likely not causing the patient's abdominal discomfort.  Symptoms do not seem consistent with GB disease, she is nontender on exam today, and symptoms seem to occur mostly after injection of her insulin.  Recommend the patient try OTC antacid medication for the abdominal discomfort if it occurs again (after coffee, etc.) and make an appointment with PCP/GI if symptoms continue.           History of Present Illness   HPI  Hyacinth Parra is a 54 y.o. female with history of HTN, HLD, DM, anxiety, and depression who presents for consultation regarding an abnormal imaging finding of the liver.    Patient was seen in the emergency room on 2025 for a few separate complaints, per ER EMR. The patient had been experiencing ongoing right upper quadrant pain which is sharp and occurs during the injection of her insulin.  She tells me she thought someone was poisoning her insulin at that time.  She was not experiencing pain after meals and reported no heartburn or reflux symptoms.  She denies eating greasy, fatty meals.  She does notice the discomfort after drinking coffee in the morning as well.  She has not tried any medication for this.      CT of the abdomen and pelvis with contrast to evaluate the RUQ revealed an indeterminate hypodense right hepatic  lobe lesion measuring up to 3 cm.  Nonemergent contrast-enhanced MRI of the liver recommended to further evaluate.  Patient with normal hepatic function (4/19/2025).  She denies jaundice, vomiting, diarrhea, or GI bleeding.    Patient denies any personal history of chronic liver disease.  Denies a history of autoimmune disease. Denies any known past or current infection with viral hepatitis.  Reports no current or recent use of herbal supplements, performance-enhancing or recreational drugs, or OTC/prescription medications not reflected on the med list.   Denies excessive Tylenol use. Denies ETOH use. Denies previous IV drug use.     Denies a family history of liver disease or liver-related cancers.     Colonoscopy (6/25/2022) No polyps. Repeat in 5 years due to family history of colon cancer.     History obtained from: patient    Review of Systems   All other systems reviewed and are negative.    Pertinent Medical History         Medical History Reviewed by provider this encounter:     .  Past Medical History   Past Medical History:   Diagnosis Date    Asthma     Depression     Diabetes mellitus (HCC)     type 2    Hyperlipidemia     Hypertension      Past Surgical History:   Procedure Laterality Date    KNEE ARTHROCENTESIS      MOUTH SURGERY       Family History   Problem Relation Age of Onset    Diabetes Mother     Diabetes Father     Ovarian cancer Sister     Colon cancer Brother       reports that she has been smoking cigarettes. She has a 35 pack-year smoking history. She has never used smokeless tobacco. She reports that she does not currently use alcohol. She reports that she does not use drugs.  Current Outpatient Medications   Medication Instructions    ALPRAZolam (XANAX) 0.5 mg, Oral, Daily at bedtime PRN    Aspirin Low Dose 81 mg, Oral, Daily    BD Pen Needle Maile 2nd Gen 32G X 4 MM MISC Subcutaneous, Daily, Use as directed    Blood Glucose Monitoring Suppl (ONE TOUCH ULTRA 2) w/Device KIT Check blood  sugars three times daily. Please substitute with appropriate alternative as covered by patient's insurance. Dx: E11.65    Blood Glucose Monitoring Suppl (OneTouch Verio Reflect) w/Device KIT Check blood sugars three times daily. Please substitute with appropriate alternative as covered by patient's insurance. Dx: E11.65    clotrimazole-betamethasone (LOTRISONE) 1-0.05 % cream Topical, 2 times daily, To affected areas of vulva for itching.    Continuous Glucose Sensor (Dexcom G6 Sensor) MISC 3 PACK SENSOR FOR CONTINUOUS GLUCOSE MONITORING    Continuous Glucose Transmitter (Dexcom G6 Transmitter) MISC 1 TRANSMITTED EVERY 3 MONTHS FOR CONTINUOUS GLUCOSE MONITORING    Diclofenac Sodium (VOLTAREN) 2 g, Topical, 4 times daily, Apply to area of back where pain occurs    estradiol (ESTRACE VAGINAL) 1 g, Vaginal, 2 times weekly    glucose blood (OneTouch Ultra) test strip Check blood sugars three times daily. Please substitute with appropriate alternative as covered by patient's insurance. Dx: E11.65    Insulin Glargine Solostar (LANTUS SOLOSTAR) 36 Units, Injection, Daily at bedtime,       Insulin Pen Needle (BD Pen Needle Maile U/F) 32G X 4 MM MISC Use daily as directed with insulin pen    lidocaine (Lidoderm) 5 % 1 patch, Topical, Daily, Remove & Discard patch within 12 hours or as directed by MD    metFORMIN (GLUCOPHAGE) 1,000 mg, Oral, 2 times daily with meals    Multiple Vitamins-Minerals (ONE-A-DAY FOR HER VITACRAVES PO) Take by mouth    OneTouch Delica Lancets 33G MISC Check blood sugars three times daily. Please substitute with appropriate alternative as covered by patient's insurance. Dx: E11.65    pantoprazole (PROTONIX) 20 mg, Oral, Daily before breakfast    prednisoLONE acetate (PRED FORTE) 1 % ophthalmic suspension PLEASE SEE ATTACHED FOR DETAILED DIRECTIONS    QUEtiapine (SEROQUEL) 400 mg, Oral, Daily at bedtime    ramipril (ALTACE) 2.5 mg, Oral, Daily    rosuvastatin (CRESTOR) 20 mg, Oral, Daily    Vitamin D3  2,000 Units, Oral, Daily   No Known Allergies   Current Outpatient Medications on File Prior to Visit   Medication Sig Dispense Refill    ALPRAZolam (XANAX) 0.5 mg tablet Take 1 tablet (0.5 mg total) by mouth daily at bedtime as needed for sleep or anxiety 30 tablet 0    Aspirin Low Dose 81 MG EC tablet TAKE 1 TABLET BY MOUTH EVERY DAY 90 tablet 1    Cholecalciferol (Vitamin D3) 50 MCG (2000 UT) TABS Take 1 tablet (2,000 Units total) by mouth daily 90 tablet 0    clotrimazole-betamethasone (LOTRISONE) 1-0.05 % cream Apply topically 2 (two) times a day for 7 days To affected areas of vulva for itching. 45 g 1    Continuous Glucose Transmitter (Dexcom G6 Transmitter) MISC 1 TRANSMITTED EVERY 3 MONTHS FOR CONTINUOUS GLUCOSE MONITORING 1 each 0    Diclofenac Sodium (VOLTAREN) 1 % Apply 2 g topically 4 (four) times a day Apply to area of back where pain occurs 100 g 0    Insulin Glargine Solostar (Lantus SoloStar) 100 UNIT/ML SOPN Inject 0.36 mL (36 Units total) as directed daily at bedtime 30 mL 1    lidocaine (Lidoderm) 5 % Apply 1 patch topically over 12 hours daily Remove & Discard patch within 12 hours or as directed by MD 14 patch 0    Multiple Vitamins-Minerals (ONE-A-DAY FOR HER VITACRAVES PO) Take by mouth      prednisoLONE acetate (PRED FORTE) 1 % ophthalmic suspension PLEASE SEE ATTACHED FOR DETAILED DIRECTIONS      QUEtiapine (SEROquel) 400 MG tablet TAKE 1 TABLET (400 MG TOTAL) BY MOUTH DAILY AT BEDTIME 90 tablet 0    ramipril (ALTACE) 2.5 mg capsule Take 1 capsule (2.5 mg total) by mouth daily 90 capsule 1    rosuvastatin (CRESTOR) 20 MG tablet Take 1 tablet (20 mg total) by mouth daily 90 tablet 1    BD Pen Needle Maile 2nd Gen 32G X 4 MM MISC Inject under the skin in the morning Use as directed (Patient not taking: Reported on 4/23/2025) 30 each 0    Blood Glucose Monitoring Suppl (ONE TOUCH ULTRA 2) w/Device KIT Check blood sugars three times daily. Please substitute with appropriate alternative as  covered by patient's insurance. Dx: E11.65 (Patient not taking: Reported on 5/5/2025) 1 kit 0    Blood Glucose Monitoring Suppl (OneTouch Verio Reflect) w/Device KIT Check blood sugars three times daily. Please substitute with appropriate alternative as covered by patient's insurance. Dx: E11.65 (Patient not taking: Reported on 5/5/2025) 1 kit 0    Continuous Glucose Sensor (Dexcom G6 Sensor) MISC 3 PACK SENSOR FOR CONTINUOUS GLUCOSE MONITORING (Patient not taking: Reported on 5/5/2025) 9 each 3    estradiol (ESTRACE VAGINAL) 0.1 mg/g vaginal cream Insert 1 g into the vagina 2 (two) times a week (Patient not taking: Reported on 5/5/2025) 42.5 g 1    glucose blood (OneTouch Ultra) test strip Check blood sugars three times daily. Please substitute with appropriate alternative as covered by patient's insurance. Dx: E11.65 (Patient not taking: Reported on 5/5/2025) 300 each 3    Insulin Pen Needle (BD Pen Needle Maile U/F) 32G X 4 MM MISC Use daily as directed with insulin pen (Patient not taking: Reported on 5/5/2025) 100 each 3    metFORMIN (GLUCOPHAGE) 1000 MG tablet Take 1 tablet (1,000 mg total) by mouth 2 (two) times a day with meals 180 tablet 1    OneTouch Delica Lancets 33G MISC Check blood sugars three times daily. Please substitute with appropriate alternative as covered by patient's insurance. Dx: E11.65 (Patient not taking: Reported on 5/5/2025) 300 each 3    pantoprazole (PROTONIX) 20 mg tablet TAKE 1 TABLET BY MOUTH DAILY BEFORE BREAKFAST. (Patient not taking: Reported on 5/5/2025) 30 tablet 2     No current facility-administered medications on file prior to visit.      Social History     Tobacco Use    Smoking status: Every Day     Current packs/day: 1.00     Average packs/day: 1 pack/day for 35.0 years (35.0 ttl pk-yrs)     Types: Cigarettes    Smokeless tobacco: Never   Vaping Use    Vaping status: Never Used   Substance and Sexual Activity    Alcohol use: Not Currently    Drug use: Never    Sexual  "activity: Yes     Partners: Male     Birth control/protection: None     Comment: last intercourse 2 months        Objective   /80   Pulse 74   Ht 5' 5\" (1.651 m)   Wt 75.8 kg (167 lb 3.2 oz)   BMI 27.82 kg/m²      Physical Exam  Constitutional:       Appearance: Normal appearance.   Eyes:      Conjunctiva/sclera: Conjunctivae normal.   Cardiovascular:      Rate and Rhythm: Normal rate.   Pulmonary:      Effort: Pulmonary effort is normal.   Abdominal:      General: Abdomen is flat. There is no distension.      Palpations: Abdomen is soft. There is no mass.      Tenderness: There is no guarding or rebound.      Hernia: No hernia is present.   Skin:     General: Skin is warm and dry.   Neurological:      Mental Status: She is alert. Mental status is at baseline.   Psychiatric:         Mood and Affect: Mood normal.           "

## 2025-05-05 NOTE — PATIENT INSTRUCTIONS
"Pt states she will schedule MRI Abdomen w wo Contrast ; \"Central Scheduling\" phone number given to Pt at OV.  "

## 2025-05-05 NOTE — LETTER
May 6, 2025     Pedrito Cerna MD  [address] [facility] [address]    Patient: Hyacinth Parra   YOB: 1970   Date of Visit: 2025       Dear Dr. Pedrito Cerna MD:    Thank you for referring Hyacinth Parra to me for evaluation. Below are my notes for this consultation.    If you have questions, please do not hesitate to call me. I look forward to following your patient along with you.         Sincerely,        Sharee Grimaldo MD        CC: No Recipients    Goldie Cosby PA-C  2025  4:42 PM  In Progress  Name: Hyacinth Parra      : 1970      MRN: [id number]  Encounter Provider: Sharee Grimaldo MD  Encounter Date: 2025   Encounter department: [facility] GASTROENTEROLOGY [address]  :  Assessment & Plan  Abnormal finding on imaging of liver  Reviewed the patient's EMR. She had CT AP w contrast which revealed an incidental finding of an indeterminate hypodense lesion.  This is likely a hemangioma.  We will order an MRI, hepatoma protocol, to evaluate the liver lesion.  If confirmed hemangioma on imaging, we will not need to follow this, and the patient may follow-up with her PCP.  The patient agrees with this treatment plan.      We discussed the liver lesion is likely not causing the patient's abdominal discomfort.  Symptoms do not seem consistent with GB disease, she is nontender on exam today, and symptoms seem to occur mostly after injection of her insulin.  Recommend the patient try OTC antacid medication for the abdominal discomfort if it occurs again (after coffee, etc.) and make an appointment with PCP/GI if symptoms continue.           History of Present Illness  HPI  Hyacinth Parra is a 54 y.o. female with history of HTN, HLD, DM, anxiety, and depression who presents for consultation regarding an abnormal imaging finding of the liver.    Patient was seen in the emergency room on 2025 for a few separate complaints, per ER EMR. The patient had been experiencing ongoing  right upper quadrant pain which is sharp and occurs during the injection of her insulin.  She tells me she thought someone was poisoning her insulin at that time.  She was not experiencing pain after meals and reported no heartburn or reflux symptoms.  She denies eating greasy, fatty meals.  She does notice the discomfort after drinking coffee in the morning as well.  She has not tried any medication for this.      CT of the abdomen and pelvis with contrast to evaluate the RUQ revealed an indeterminate hypodense right hepatic lobe lesion measuring up to 3 cm.  Nonemergent contrast-enhanced MRI of the liver recommended to further evaluate.  Patient with normal hepatic function (4/19/2025).  She denies jaundice, vomiting, diarrhea, or GI bleeding.    Patient denies any personal history of chronic liver disease.  Denies a history of autoimmune disease. Denies any known past or current infection with viral hepatitis.  Reports no current or recent use of herbal supplements, performance-enhancing or recreational drugs, or OTC/prescription medications not reflected on the med list.   Denies excessive Tylenol use. Denies ETOH use. Denies previous IV drug use.     Denies a family history of liver disease or liver-related cancers.     Colonoscopy (6/25/2022) No polyps. Repeat in 5 years due to family history of colon cancer.     History obtained from: patient    Review of Systems   All other systems reviewed and are negative.    Pertinent Medical History        Medical History Reviewed by provider this encounter:     .  Past Medical History  Past Medical History:   Diagnosis Date   • Asthma    • Depression    • Diabetes mellitus (HCC)     type 2   • Hyperlipidemia    • Hypertension      Past Surgical History:   Procedure Laterality Date   • KNEE ARTHROCENTESIS     • MOUTH SURGERY       Family History   Problem Relation Age of Onset   • Diabetes Mother    • Diabetes Father    • Ovarian cancer Sister    • Colon cancer Brother        reports that she has been smoking cigarettes. She has a 35 pack-year smoking history. She has never used smokeless tobacco. She reports that she does not currently use alcohol. She reports that she does not use drugs.  Current Outpatient Medications   Medication Instructions   • ALPRAZolam (XANAX) 0.5 mg, Oral, Daily at bedtime PRN   • Aspirin Low Dose 81 mg, Oral, Daily   • BD Pen Needle Maile 2nd Gen 32G X 4 MM MISC Subcutaneous, Daily, Use as directed   • Blood Glucose Monitoring Suppl (ONE TOUCH ULTRA 2) w/Device KIT Check blood sugars three times daily. Please substitute with appropriate alternative as covered by patient's insurance. Dx: E11.65   • Blood Glucose Monitoring Suppl (OneTouch Verio Reflect) w/Device KIT Check blood sugars three times daily. Please substitute with appropriate alternative as covered by patient's insurance. Dx: E11.65   • clotrimazole-betamethasone (LOTRISONE) 1-0.05 % cream Topical, 2 times daily, To affected areas of vulva for itching.   • Continuous Glucose Sensor (Dexcom G6 Sensor) MISC 3 PACK SENSOR FOR CONTINUOUS GLUCOSE MONITORING   • Continuous Glucose Transmitter (Dexcom G6 Transmitter) MISC 1 TRANSMITTED EVERY 3 MONTHS FOR CONTINUOUS GLUCOSE MONITORING   • Diclofenac Sodium (VOLTAREN) 2 g, Topical, 4 times daily, Apply to area of back where pain occurs   • estradiol (ESTRACE VAGINAL) 1 g, Vaginal, 2 times weekly   • glucose blood (OneTouch Ultra) test strip Check blood sugars three times daily. Please substitute with appropriate alternative as covered by patient's insurance. Dx: E11.65   • Insulin Glargine Solostar (LANTUS SOLOSTAR) 36 Units, Injection, Daily at bedtime,      • Insulin Pen Needle (BD Pen Needle Maile U/F) 32G X 4 MM MISC Use daily as directed with insulin pen   • lidocaine (Lidoderm) 5 % 1 patch, Topical, Daily, Remove & Discard patch within 12 hours or as directed by MD   • metFORMIN (GLUCOPHAGE) 1,000 mg, Oral, 2 times daily with meals   • Multiple  Vitamins-Minerals (ONE-A-DAY FOR HER VITACRAVES PO) Take by mouth   • OneTouch Delica Lancets 33G MISC Check blood sugars three times daily. Please substitute with appropriate alternative as covered by patient's insurance. Dx: E11.65   • pantoprazole (PROTONIX) 20 mg, Oral, Daily before breakfast   • prednisoLONE acetate (PRED FORTE) 1 % ophthalmic suspension PLEASE SEE ATTACHED FOR DETAILED DIRECTIONS   • QUEtiapine (SEROQUEL) 400 mg, Oral, Daily at bedtime   • ramipril (ALTACE) 2.5 mg, Oral, Daily   • rosuvastatin (CRESTOR) 20 mg, Oral, Daily   • Vitamin D3 2,000 Units, Oral, Daily   No Known Allergies   Current Outpatient Medications on File Prior to Visit   Medication Sig Dispense Refill   • ALPRAZolam (XANAX) 0.5 mg tablet Take 1 tablet (0.5 mg total) by mouth daily at bedtime as needed for sleep or anxiety 30 tablet 0   • Aspirin Low Dose 81 MG EC tablet TAKE 1 TABLET BY MOUTH EVERY DAY 90 tablet 1   • Cholecalciferol (Vitamin D3) 50 MCG (2000 UT) TABS Take 1 tablet (2,000 Units total) by mouth daily 90 tablet 0   • clotrimazole-betamethasone (LOTRISONE) 1-0.05 % cream Apply topically 2 (two) times a day for 7 days To affected areas of vulva for itching. 45 g 1   • Continuous Glucose Transmitter (Dexcom G6 Transmitter) MISC 1 TRANSMITTED EVERY 3 MONTHS FOR CONTINUOUS GLUCOSE MONITORING 1 each 0   • Diclofenac Sodium (VOLTAREN) 1 % Apply 2 g topically 4 (four) times a day Apply to area of back where pain occurs 100 g 0   • Insulin Glargine Solostar (Lantus SoloStar) 100 UNIT/ML SOPN Inject 0.36 mL (36 Units total) as directed daily at bedtime 30 mL 1   • lidocaine (Lidoderm) 5 % Apply 1 patch topically over 12 hours daily Remove & Discard patch within 12 hours or as directed by MD 14 patch 0   • Multiple Vitamins-Minerals (ONE-A-DAY FOR HER VITACRAVES PO) Take by mouth     • prednisoLONE acetate (PRED FORTE) 1 % ophthalmic suspension PLEASE SEE ATTACHED FOR DETAILED DIRECTIONS     • QUEtiapine (SEROquel) 400 MG  tablet TAKE 1 TABLET (400 MG TOTAL) BY MOUTH DAILY AT BEDTIME 90 tablet 0   • ramipril (ALTACE) 2.5 mg capsule Take 1 capsule (2.5 mg total) by mouth daily 90 capsule 1   • rosuvastatin (CRESTOR) 20 MG tablet Take 1 tablet (20 mg total) by mouth daily 90 tablet 1   • BD Pen Needle Maile 2nd Gen 32G X 4 MM MISC Inject under the skin in the morning Use as directed (Patient not taking: Reported on 4/23/2025) 30 each 0   • Blood Glucose Monitoring Suppl (ONE TOUCH ULTRA 2) w/Device KIT Check blood sugars three times daily. Please substitute with appropriate alternative as covered by patient's insurance. Dx: E11.65 (Patient not taking: Reported on 5/5/2025) 1 kit 0   • Blood Glucose Monitoring Suppl (OneTouch Verio Reflect) w/Device KIT Check blood sugars three times daily. Please substitute with appropriate alternative as covered by patient's insurance. Dx: E11.65 (Patient not taking: Reported on 5/5/2025) 1 kit 0   • Continuous Glucose Sensor (Dexcom G6 Sensor) MISC 3 PACK SENSOR FOR CONTINUOUS GLUCOSE MONITORING (Patient not taking: Reported on 5/5/2025) 9 each 3   • estradiol (ESTRACE VAGINAL) 0.1 mg/g vaginal cream Insert 1 g into the vagina 2 (two) times a week (Patient not taking: Reported on 5/5/2025) 42.5 g 1   • glucose blood (OneTouch Ultra) test strip Check blood sugars three times daily. Please substitute with appropriate alternative as covered by patient's insurance. Dx: E11.65 (Patient not taking: Reported on 5/5/2025) 300 each 3   • Insulin Pen Needle (BD Pen Needle Maile U/F) 32G X 4 MM MISC Use daily as directed with insulin pen (Patient not taking: Reported on 5/5/2025) 100 each 3   • metFORMIN (GLUCOPHAGE) 1000 MG tablet Take 1 tablet (1,000 mg total) by mouth 2 (two) times a day with meals 180 tablet 1   • OneTouch Delica Lancets 33G MISC Check blood sugars three times daily. Please substitute with appropriate alternative as covered by patient's insurance. Dx: E11.65 (Patient not taking: Reported on  "5/5/2025) 300 each 3   • pantoprazole (PROTONIX) 20 mg tablet TAKE 1 TABLET BY MOUTH DAILY BEFORE BREAKFAST. (Patient not taking: Reported on 5/5/2025) 30 tablet 2     No current facility-administered medications on file prior to visit.      Social History     Tobacco Use   • Smoking status: Every Day     Current packs/day: 1.00     Average packs/day: 1 pack/day for 35.0 years (35.0 ttl pk-yrs)     Types: Cigarettes   • Smokeless tobacco: Never   Vaping Use   • Vaping status: Never Used   Substance and Sexual Activity   • Alcohol use: Not Currently   • Drug use: Never   • Sexual activity: Yes     Partners: Male     Birth control/protection: None     Comment: last intercourse 2 months        Objective  /80   Pulse 74   Ht 5' 5\" (1.651 m)   Wt 75.8 kg (167 lb 3.2 oz)   BMI 27.82 kg/m²      Physical Exam  Constitutional:       Appearance: Normal appearance.   Eyes:      Conjunctiva/sclera: Conjunctivae normal.   Cardiovascular:      Rate and Rhythm: Normal rate.   Pulmonary:      Effort: Pulmonary effort is normal.   Abdominal:      General: Abdomen is flat. There is no distension.      Palpations: Abdomen is soft. There is no mass.      Tenderness: There is no guarding or rebound.      Hernia: No hernia is present.   Skin:     General: Skin is warm and dry.   Neurological:      Mental Status: She is alert. Mental status is at baseline.   Psychiatric:         Mood and Affect: Mood normal.           "

## 2025-05-06 ENCOUNTER — TELEPHONE (OUTPATIENT)
Age: 55
End: 2025-05-06

## 2025-05-06 NOTE — TELEPHONE ENCOUNTER
----- Message from Sharee Grimaldo MD sent at 5/6/2025 11:13 AM EDT -----  Hi can we please let her know that we added on H. Pylori stool antigen testing to be completed for work-up of her pain?

## 2025-05-20 DIAGNOSIS — E11.9 TYPE 2 DIABETES MELLITUS WITHOUT COMPLICATION, UNSPECIFIED WHETHER LONG TERM INSULIN USE (HCC): ICD-10-CM

## 2025-05-20 RX ORDER — INSULIN GLARGINE 100 [IU]/ML
INJECTION, SOLUTION SUBCUTANEOUS
Qty: 30 ML | Refills: 1 | Status: SHIPPED | OUTPATIENT
Start: 2025-05-20

## 2025-06-17 DIAGNOSIS — Z76.0 ENCOUNTER FOR MEDICATION REFILL: ICD-10-CM

## 2025-06-17 RX ORDER — QUETIAPINE FUMARATE 400 MG/1
400 TABLET, FILM COATED ORAL
Qty: 90 TABLET | Refills: 0 | Status: SHIPPED | OUTPATIENT
Start: 2025-06-17

## 2025-06-27 ENCOUNTER — NURSE TRIAGE (OUTPATIENT)
Dept: OTHER | Facility: OTHER | Age: 55
End: 2025-06-27

## 2025-06-27 ENCOUNTER — TELEPHONE (OUTPATIENT)
Dept: OTHER | Facility: OTHER | Age: 55
End: 2025-06-27

## 2025-06-27 DIAGNOSIS — E11.9 TYPE 2 DIABETES MELLITUS WITHOUT COMPLICATION, UNSPECIFIED WHETHER LONG TERM INSULIN USE (HCC): Primary | ICD-10-CM

## 2025-06-27 RX ORDER — INSULIN GLARGINE 100 [IU]/ML
36 INJECTION, SOLUTION SUBCUTANEOUS
Qty: 30 ML | Refills: 0 | Status: SHIPPED | OUTPATIENT
Start: 2025-06-27

## 2025-06-27 NOTE — TELEPHONE ENCOUNTER
"REASON FOR CONVERSATION: Medication Problem    SYMPTOMS: Blurry vision, dizziness, peeing a lot    OTHER HEALTH INFORMATION: pt unsure what BG is because Dexcom not working    PROTOCOL DISPOSITION: Go to ED Now (or PCP Triage)    CARE ADVICE PROVIDED: go to ED. Also made follow up appt for Monday.    Unsure if patient will go to ED.      PRACTICE FOLLOW-UP: pt has appt on Monday. Follow up if needed, thank you!              Reason for Disposition   Patient sounds very sick or weak to the triager    Answer Assessment - Initial Assessment Questions  1. DRUG NAME: \"What medicine do you need to have refilled?\"      Lantus 36 units       Patient reports taking lantus in morning not at night.    2. REFILLS REMAINING: \"How many refills are remaining?\" (Note: The label on the medicine or pill bottle will show how many refills are remaining. If there are no refills remaining, then a renewal may be needed.)      0    3. EXPIRATION DATE: \"What is the expiration date?\" (Note: The label states when the prescription will , and thus can no longer be refilled.)      --    4. PRESCRIBING HCP: \"Who prescribed it?\" Reason: If prescribed by specialist, call should be referred to that group.      [facility]    5. SYMPTOMS: \"Do you have any symptoms?\"      Blurry vision, dizziness, peeing a lot      Patient will not get insulin until Wednesday from mail order pharmacy.    Last had insulin 3 days ago.    Patient has not been monitoring blood sugar. Reports problem with dexcom g6.    Takes metformin 1000 mg BID    Answer Assessment - Initial Assessment Questions  1. BLOOD GLUCOSE: \"What is your blood glucose level?\"       Patient unsure      6. INSULIN: \"Do you take insulin?\" \"What type of insulin(s) do you use? What is the mode of delivery? (syringe, pen; injection or pump)?\"       Lantus 36 units    7. DIABETES PILLS: \"Do you take any pills for your diabetes?\" If Yes, ask: \"Have you missed taking any pills recently?\"      Metformin " "1000 BID    8. OTHER SYMPTOMS: \"Do you have any symptoms?\" (e.g., fever, frequent urination, difficulty breathing, dizziness, weakness, vomiting)      Blurry vision, dizziness, peeing a lot    Protocols used: Medication Refill and Renewal Call-Adult-, Diabetes - High Blood Sugar-Adult-    "

## 2025-06-27 NOTE — TELEPHONE ENCOUNTER
Regarding: Not getting insulin until next wednesday sugar high asking if insulin can be call in local pharmacy  ----- Message from Shabnam VAUGHN sent at 6/27/2025  5:28 PM EDT -----  '' I'm not getting my insulin until next Wednesday and It feel like my sugar is high. I'm asking if insulin can be call into the local pharmacy until I get my insulin next week.  [facility]

## 2025-06-30 DIAGNOSIS — E11.65 TYPE 2 DIABETES MELLITUS WITH HYPERGLYCEMIA, WITH LONG-TERM CURRENT USE OF INSULIN (HCC): Primary | ICD-10-CM

## 2025-06-30 DIAGNOSIS — Z79.4 TYPE 2 DIABETES MELLITUS WITH HYPERGLYCEMIA, WITH LONG-TERM CURRENT USE OF INSULIN (HCC): Primary | ICD-10-CM

## 2025-07-09 ENCOUNTER — TELEPHONE (OUTPATIENT)
Age: 55
End: 2025-07-09

## 2025-07-09 NOTE — TELEPHONE ENCOUNTER
Pt calling for a STD blood work panel order. Pt denies symptoms but has been drowsy. Pt has new partner and would like to be preventative.

## 2025-07-10 DIAGNOSIS — Z11.3 SCREEN FOR STD (SEXUALLY TRANSMITTED DISEASE): ICD-10-CM

## 2025-07-10 DIAGNOSIS — Z11.59 NEED FOR HEPATITIS B SCREENING TEST: ICD-10-CM

## 2025-07-10 DIAGNOSIS — Z11.4 SCREENING FOR HIV (HUMAN IMMUNODEFICIENCY VIRUS): Primary | ICD-10-CM

## 2025-07-10 DIAGNOSIS — Z11.59 NEED FOR HEPATITIS C SCREENING TEST: ICD-10-CM

## 2025-07-10 NOTE — TELEPHONE ENCOUNTER
Attempted to contact patient to make her ware of orders entered. No answer, unable to leave message due to voicemail not set up.

## 2025-08-01 ENCOUNTER — NURSE TRIAGE (OUTPATIENT)
Age: 55
End: 2025-08-01

## 2025-08-01 DIAGNOSIS — B37.9 YEAST INFECTION: Primary | ICD-10-CM

## 2025-08-01 RX ORDER — FLUCONAZOLE 150 MG/1
150 TABLET ORAL DAILY
Qty: 1 TABLET | Refills: 0 | Status: SHIPPED | OUTPATIENT
Start: 2025-08-01 | End: 2025-08-02

## 2025-08-04 ENCOUNTER — NURSE TRIAGE (OUTPATIENT)
Age: 55
End: 2025-08-04

## 2025-08-04 ENCOUNTER — APPOINTMENT (OUTPATIENT)
Dept: LAB | Facility: HOSPITAL | Age: 55
End: 2025-08-04
Attending: PHYSICIAN ASSISTANT
Payer: COMMERCIAL

## 2025-08-04 DIAGNOSIS — E78.5 HYPERLIPIDEMIA, UNSPECIFIED HYPERLIPIDEMIA TYPE: ICD-10-CM

## 2025-08-04 DIAGNOSIS — R30.0 DYSURIA: ICD-10-CM

## 2025-08-04 DIAGNOSIS — R10.13 ABDOMINAL PAIN, EPIGASTRIC: Primary | ICD-10-CM

## 2025-08-04 DIAGNOSIS — Z11.3 SCREEN FOR STD (SEXUALLY TRANSMITTED DISEASE): ICD-10-CM

## 2025-08-04 DIAGNOSIS — Z11.3 SCREEN FOR STD (SEXUALLY TRANSMITTED DISEASE): Primary | ICD-10-CM

## 2025-08-04 DIAGNOSIS — Z11.59 NEED FOR HEPATITIS C SCREENING TEST: ICD-10-CM

## 2025-08-04 DIAGNOSIS — Z11.4 SCREENING FOR HIV (HUMAN IMMUNODEFICIENCY VIRUS): ICD-10-CM

## 2025-08-04 LAB
BACTERIA UR QL AUTO: ABNORMAL /HPF
BILIRUB UR QL STRIP: NEGATIVE
CHOLEST SERPL-MCNC: 118 MG/DL (ref ?–200)
CLARITY UR: ABNORMAL
COLOR UR: YELLOW
GLUCOSE UR STRIP-MCNC: NEGATIVE MG/DL
HDLC SERPL-MCNC: 41 MG/DL
HGB UR QL STRIP.AUTO: ABNORMAL
KETONES UR STRIP-MCNC: NEGATIVE MG/DL
LDLC SERPL CALC-MCNC: 60 MG/DL (ref 0–100)
LEUKOCYTE ESTERASE UR QL STRIP: ABNORMAL
NITRITE UR QL STRIP: POSITIVE
NON-SQ EPI CELLS URNS QL MICRO: ABNORMAL /HPF
OTHER STN SPEC: ABNORMAL
PH UR STRIP.AUTO: 6 [PH]
PROT UR STRIP-MCNC: ABNORMAL MG/DL
RBC #/AREA URNS AUTO: ABNORMAL /HPF
SP GR UR STRIP.AUTO: >=1.03 (ref 1–1.03)
TRIGL SERPL-MCNC: 86 MG/DL (ref ?–150)
UROBILINOGEN UR QL STRIP.AUTO: 1 E.U./DL
WBC #/AREA URNS AUTO: ABNORMAL /HPF
WBC CLUMPS # UR AUTO: PRESENT /UL

## 2025-08-04 PROCEDURE — 81001 URINALYSIS AUTO W/SCOPE: CPT

## 2025-08-04 PROCEDURE — 36415 COLL VENOUS BLD VENIPUNCTURE: CPT

## 2025-08-04 PROCEDURE — 86803 HEPATITIS C AB TEST: CPT

## 2025-08-04 PROCEDURE — 87491 CHLMYD TRACH DNA AMP PROBE: CPT | Performed by: PHYSICIAN ASSISTANT

## 2025-08-04 PROCEDURE — 87086 URINE CULTURE/COLONY COUNT: CPT | Performed by: PHYSICIAN ASSISTANT

## 2025-08-04 PROCEDURE — 87591 N.GONORRHOEAE DNA AMP PROB: CPT | Performed by: PHYSICIAN ASSISTANT

## 2025-08-04 PROCEDURE — 80061 LIPID PANEL: CPT

## 2025-08-04 PROCEDURE — 87077 CULTURE AEROBIC IDENTIFY: CPT | Performed by: PHYSICIAN ASSISTANT

## 2025-08-04 PROCEDURE — 87340 HEPATITIS B SURFACE AG IA: CPT

## 2025-08-04 PROCEDURE — 87186 SC STD MICRODIL/AGAR DIL: CPT | Performed by: PHYSICIAN ASSISTANT

## 2025-08-04 PROCEDURE — 86780 TREPONEMA PALLIDUM: CPT

## 2025-08-04 PROCEDURE — 87389 HIV-1 AG W/HIV-1&-2 AB AG IA: CPT

## 2025-08-05 ENCOUNTER — OFFICE VISIT (OUTPATIENT)
Dept: OBGYN CLINIC | Facility: CLINIC | Age: 55
End: 2025-08-05
Payer: COMMERCIAL

## 2025-08-05 ENCOUNTER — TELEPHONE (OUTPATIENT)
Age: 55
End: 2025-08-05

## 2025-08-05 VITALS
WEIGHT: 166 LBS | DIASTOLIC BLOOD PRESSURE: 80 MMHG | BODY MASS INDEX: 27.66 KG/M2 | HEIGHT: 65 IN | SYSTOLIC BLOOD PRESSURE: 120 MMHG

## 2025-08-05 DIAGNOSIS — R32 URINARY INCONTINENCE, UNSPECIFIED TYPE: ICD-10-CM

## 2025-08-05 DIAGNOSIS — Z11.3 SCREENING FOR STDS (SEXUALLY TRANSMITTED DISEASES): ICD-10-CM

## 2025-08-05 DIAGNOSIS — N89.8 VAGINAL DISCHARGE: ICD-10-CM

## 2025-08-05 DIAGNOSIS — R30.0 DYSURIA: Primary | ICD-10-CM

## 2025-08-05 LAB
BACTERIA UR QL AUTO: ABNORMAL /HPF
BILIRUB UR QL STRIP: NEGATIVE
C TRACH DNA SPEC QL NAA+PROBE: NEGATIVE
CLARITY UR: CLEAR
COLOR UR: YELLOW
GLUCOSE UR STRIP-MCNC: ABNORMAL MG/DL
HBV SURFACE AG SER QL: NORMAL
HCV AB SER QL: NORMAL
HGB UR QL STRIP.AUTO: NEGATIVE
HIV 1+2 AB+HIV1 P24 AG SERPL QL IA: NORMAL
KETONES UR STRIP-MCNC: NEGATIVE MG/DL
LEUKOCYTE ESTERASE UR QL STRIP: ABNORMAL
N GONORRHOEA DNA SPEC QL NAA+PROBE: NEGATIVE
NITRITE UR QL STRIP: NEGATIVE
NON-SQ EPI CELLS URNS QL MICRO: ABNORMAL /HPF
PH UR STRIP.AUTO: 6 [PH]
PROT UR STRIP-MCNC: NEGATIVE MG/DL
RBC #/AREA URNS AUTO: ABNORMAL /HPF
SL AMB  POCT GLUCOSE, UA: ABNORMAL
SL AMB LEUKOCYTE ESTERASE,UA: ABNORMAL
SL AMB POCT BILIRUBIN,UA: ABNORMAL
SL AMB POCT BLOOD,UA: ABNORMAL
SL AMB POCT KETONES,UA: ABNORMAL
SL AMB POCT NITRITE,UA: ABNORMAL
SL AMB POCT PH,UA: 5
SL AMB POCT SPECIFIC GRAVITY,UA: 1.01
SL AMB POCT URINE PROTEIN: ABNORMAL
SL AMB POCT UROBILINOGEN: ABNORMAL
SP GR UR STRIP.AUTO: 1.01 (ref 1–1.03)
TREPONEMA PALLIDUM IGG+IGM AB [PRESENCE] IN SERUM OR PLASMA BY IMMUNOASSAY: NORMAL
UROBILINOGEN UR STRIP-ACNC: <2 MG/DL
WBC #/AREA URNS AUTO: ABNORMAL /HPF

## 2025-08-05 PROCEDURE — 87086 URINE CULTURE/COLONY COUNT: CPT | Performed by: OBSTETRICS & GYNECOLOGY

## 2025-08-05 PROCEDURE — 87510 GARDNER VAG DNA DIR PROBE: CPT | Performed by: OBSTETRICS & GYNECOLOGY

## 2025-08-05 PROCEDURE — 99213 OFFICE O/P EST LOW 20 MIN: CPT | Performed by: OBSTETRICS & GYNECOLOGY

## 2025-08-05 PROCEDURE — 87491 CHLMYD TRACH DNA AMP PROBE: CPT | Performed by: OBSTETRICS & GYNECOLOGY

## 2025-08-05 PROCEDURE — 81002 URINALYSIS NONAUTO W/O SCOPE: CPT | Performed by: OBSTETRICS & GYNECOLOGY

## 2025-08-05 PROCEDURE — 81001 URINALYSIS AUTO W/SCOPE: CPT | Performed by: OBSTETRICS & GYNECOLOGY

## 2025-08-05 PROCEDURE — 87660 TRICHOMONAS VAGIN DIR PROBE: CPT | Performed by: OBSTETRICS & GYNECOLOGY

## 2025-08-05 PROCEDURE — 87591 N.GONORRHOEAE DNA AMP PROB: CPT | Performed by: OBSTETRICS & GYNECOLOGY

## 2025-08-05 PROCEDURE — 87480 CANDIDA DNA DIR PROBE: CPT | Performed by: OBSTETRICS & GYNECOLOGY

## 2025-08-06 ENCOUNTER — TELEPHONE (OUTPATIENT)
Age: 55
End: 2025-08-06

## 2025-08-06 LAB
BACTERIA UR CULT: ABNORMAL
C TRACH DNA SPEC QL NAA+PROBE: POSITIVE
CANDIDA RRNA VAG QL PROBE: NOT DETECTED
G VAGINALIS RRNA GENITAL QL PROBE: DETECTED
N GONORRHOEA DNA SPEC QL NAA+PROBE: NEGATIVE
T VAGINALIS RRNA GENITAL QL PROBE: NOT DETECTED

## 2025-08-07 ENCOUNTER — TELEPHONE (OUTPATIENT)
Dept: OBGYN CLINIC | Facility: CLINIC | Age: 55
End: 2025-08-07

## 2025-08-07 DIAGNOSIS — N76.0 BV (BACTERIAL VAGINOSIS): ICD-10-CM

## 2025-08-07 DIAGNOSIS — A74.9 CHLAMYDIA: ICD-10-CM

## 2025-08-07 DIAGNOSIS — N39.0 URINARY TRACT INFECTION WITHOUT HEMATURIA, SITE UNSPECIFIED: Primary | ICD-10-CM

## 2025-08-07 DIAGNOSIS — B96.89 BV (BACTERIAL VAGINOSIS): ICD-10-CM

## 2025-08-07 LAB — BACTERIA UR CULT: NORMAL

## 2025-08-07 RX ORDER — DOXYCYCLINE 100 MG/1
100 TABLET ORAL 2 TIMES DAILY
Qty: 14 TABLET | Refills: 0 | Status: SHIPPED | OUTPATIENT
Start: 2025-08-07 | End: 2025-08-14

## 2025-08-07 RX ORDER — NITROFURANTOIN 25; 75 MG/1; MG/1
100 CAPSULE ORAL 2 TIMES DAILY
Qty: 14 CAPSULE | Refills: 0 | Status: SHIPPED | OUTPATIENT
Start: 2025-08-07 | End: 2025-08-14

## 2025-08-07 RX ORDER — METRONIDAZOLE 7.5 MG/G
1 GEL VAGINAL DAILY
Qty: 5 G | Refills: 0 | Status: SHIPPED | OUTPATIENT
Start: 2025-08-07 | End: 2025-08-12